# Patient Record
Sex: MALE | Race: WHITE | Employment: OTHER | URBAN - METROPOLITAN AREA
[De-identification: names, ages, dates, MRNs, and addresses within clinical notes are randomized per-mention and may not be internally consistent; named-entity substitution may affect disease eponyms.]

---

## 2017-02-27 ENCOUNTER — HOSPITAL ENCOUNTER (OUTPATIENT)
Age: 62
Setting detail: SPECIMEN
Discharge: HOME | End: 2017-02-27
Payer: COMMERCIAL

## 2017-02-27 ENCOUNTER — OFFICE VISIT (OUTPATIENT)
Dept: INTERNAL MEDICINE | Facility: CLINIC | Age: 62
End: 2017-02-27

## 2017-02-27 VITALS
HEIGHT: 74 IN | WEIGHT: 205 LBS | BODY MASS INDEX: 26.31 KG/M2 | DIASTOLIC BLOOD PRESSURE: 70 MMHG | SYSTOLIC BLOOD PRESSURE: 112 MMHG | RESPIRATION RATE: 14 BRPM

## 2017-02-27 DIAGNOSIS — J45.21 MILD INTERMITTENT ASTHMA WITH ACUTE EXACERBATION: Primary | ICD-10-CM

## 2017-02-27 DIAGNOSIS — G89.29 CHRONIC LOW BACK PAIN WITH SCIATICA, SCIATICA LATERALITY UNSPECIFIED, UNSPECIFIED BACK PAIN LATERALITY: ICD-10-CM

## 2017-02-27 DIAGNOSIS — T50.905A ADVERSE DRUG REACTION, INITIAL ENCOUNTER: ICD-10-CM

## 2017-02-27 DIAGNOSIS — K70.9 LIVER DISEASE, CHRONIC, DUE TO ALCOHOL (HCC): ICD-10-CM

## 2017-02-27 DIAGNOSIS — M54.40 CHRONIC LOW BACK PAIN WITH SCIATICA, SCIATICA LATERALITY UNSPECIFIED, UNSPECIFIED BACK PAIN LATERALITY: ICD-10-CM

## 2017-02-27 PROBLEM — F10.10 CHRONIC ALCOHOL ABUSE: Status: ACTIVE | Noted: 2017-02-27

## 2017-02-27 PROBLEM — F10.10 CHRONIC ALCOHOL ABUSE: Status: RESOLVED | Noted: 2017-02-27 | Resolved: 2017-02-27

## 2017-02-27 LAB
-: ABNORMAL
ABSOLUTE EOS #: 0.3 K/UL (ref 0–0.4)
ABSOLUTE LYMPH #: 2.9 K/UL (ref 1–4.8)
ABSOLUTE MONO #: 0.7 K/UL (ref 0.1–1.2)
ALBUMIN SERPL-MCNC: 4.3 G/DL (ref 3.5–5.2)
ALBUMIN/GLOBULIN RATIO: 1.6 (ref 1–2.5)
ALP BLD-CCNC: 56 U/L (ref 40–129)
ALT SERPL-CCNC: 12 U/L (ref 5–41)
AMORPHOUS: ABNORMAL
ANION GAP SERPL CALCULATED.3IONS-SCNC: 13 MMOL/L (ref 9–17)
AST SERPL-CCNC: 14 U/L
BACTERIA: ABNORMAL
BASOPHILS # BLD: 0 % (ref 0–2)
BASOPHILS ABSOLUTE: 0 K/UL (ref 0–0.2)
BILIRUB SERPL-MCNC: 0.56 MG/DL (ref 0.3–1.2)
BILIRUBIN URINE: NEGATIVE
BUN BLDV-MCNC: 12 MG/DL (ref 8–23)
BUN/CREAT BLD: ABNORMAL (ref 9–20)
C-REACTIVE PROTEIN: 4.8 MG/L (ref 0–5)
CALCIUM SERPL-MCNC: 9.4 MG/DL (ref 8.6–10.4)
CASTS UA: ABNORMAL /LPF (ref 0–8)
CHLORIDE BLD-SCNC: 97 MMOL/L (ref 98–107)
CO2: 22 MMOL/L (ref 20–31)
COLOR: YELLOW
CREAT SERPL-MCNC: 0.76 MG/DL (ref 0.7–1.2)
CRYSTALS, UA: ABNORMAL /HPF
DIFFERENTIAL TYPE: NORMAL
EOSINOPHILS RELATIVE PERCENT: 3 % (ref 1–4)
EPITHELIAL CELLS UA: ABNORMAL /HPF (ref 0–5)
GFR AFRICAN AMERICAN: >60 ML/MIN
GFR NON-AFRICAN AMERICAN: >60 ML/MIN
GFR SERPL CREATININE-BSD FRML MDRD: ABNORMAL ML/MIN/{1.73_M2}
GFR SERPL CREATININE-BSD FRML MDRD: ABNORMAL ML/MIN/{1.73_M2}
GLUCOSE BLD-MCNC: 95 MG/DL (ref 70–99)
GLUCOSE URINE: NEGATIVE
HCT VFR BLD CALC: 47 % (ref 41–53)
HEMOGLOBIN: 15.7 G/DL (ref 13.5–17.5)
KETONES, URINE: NEGATIVE
LEUKOCYTE ESTERASE, URINE: NEGATIVE
LYMPHOCYTES # BLD: 33 % (ref 24–44)
MCH RBC QN AUTO: 30.1 PG (ref 26–34)
MCHC RBC AUTO-ENTMCNC: 33.4 G/DL (ref 31–37)
MCV RBC AUTO: 90.1 FL (ref 80–100)
MONOCYTES # BLD: 8 % (ref 2–11)
MUCUS: ABNORMAL
NITRITE, URINE: NEGATIVE
OTHER OBSERVATIONS UA: ABNORMAL
PDW BLD-RTO: 14.8 % (ref 12.5–15.4)
PH UA: 6.5 (ref 5–8)
PLATELET # BLD: 290 K/UL (ref 140–450)
PLATELET ESTIMATE: NORMAL
PMV BLD AUTO: 8.4 FL (ref 6–12)
POTASSIUM SERPL-SCNC: 4.2 MMOL/L (ref 3.7–5.3)
PROTEIN UA: NEGATIVE
RBC # BLD: 5.21 M/UL (ref 4.5–5.9)
RBC # BLD: NORMAL 10*6/UL
RBC UA: ABNORMAL /HPF (ref 0–4)
RENAL EPITHELIAL, UA: ABNORMAL /HPF
SEG NEUTROPHILS: 56 % (ref 36–66)
SEGMENTED NEUTROPHILS ABSOLUTE COUNT: 5 K/UL (ref 1.8–7.7)
SODIUM BLD-SCNC: 132 MMOL/L (ref 135–144)
SPECIFIC GRAVITY UA: 1.01 (ref 1–1.03)
TOTAL PROTEIN: 7 G/DL (ref 6.4–8.3)
TRICHOMONAS: ABNORMAL
TSH SERPL DL<=0.05 MIU/L-ACNC: 3.23 MIU/L (ref 0.3–5)
TURBIDITY: CLEAR
URINE HGB: ABNORMAL
UROBILINOGEN, URINE: NORMAL
WBC # BLD: 8.9 K/UL (ref 3.5–11)
WBC # BLD: NORMAL 10*3/UL
WBC UA: ABNORMAL /HPF (ref 0–5)
YEAST: ABNORMAL

## 2017-02-27 PROCEDURE — 99214 OFFICE O/P EST MOD 30 MIN: CPT | Performed by: INTERNAL MEDICINE

## 2017-02-27 ASSESSMENT — ENCOUNTER SYMPTOMS
DIARRHEA: 0
NAUSEA: 0
ABDOMINAL PAIN: 0
TROUBLE SWALLOWING: 0
CONSTIPATION: 0
COLOR CHANGE: 0
COUGH: 1
CHEST TIGHTNESS: 0
SHORTNESS OF BREATH: 0
BACK PAIN: 0
EYE PAIN: 0
EYE DISCHARGE: 0

## 2017-02-28 LAB — SEDIMENTATION RATE, ERYTHROCYTE: 3 MM (ref 0–10)

## 2017-03-23 ENCOUNTER — HOSPITAL ENCOUNTER (OUTPATIENT)
Dept: SLEEP CENTER | Age: 62
Discharge: HOME | End: 2017-03-23
Payer: COMMERCIAL

## 2017-03-23 VITALS — HEART RATE: 67 BPM | BODY MASS INDEX: 26.31 KG/M2 | HEIGHT: 74 IN | RESPIRATION RATE: 14 BRPM | WEIGHT: 205 LBS

## 2017-03-23 DIAGNOSIS — G47.33 OSA (OBSTRUCTIVE SLEEP APNEA): Primary | ICD-10-CM

## 2017-03-23 PROCEDURE — 95810 POLYSOM 6/> YRS 4/> PARAM: CPT

## 2017-03-23 ASSESSMENT — SLEEP AND FATIGUE QUESTIONNAIRES
HOW LIKELY ARE YOU TO NOD OFF OR FALL ASLEEP WHILE LYING DOWN TO REST IN THE AFTERNOON WHEN CIRCUMSTANCES PERMIT: 3
HOW LIKELY ARE YOU TO NOD OFF OR FALL ASLEEP WHILE SITTING AND TALKING TO SOMEONE: 0
HOW LIKELY ARE YOU TO NOD OFF OR FALL ASLEEP WHILE SITTING INACTIVE IN A PUBLIC PLACE: 1
HOW LIKELY ARE YOU TO NOD OFF OR FALL ASLEEP WHEN YOU ARE A PASSENGER IN A CAR FOR AN HOUR WITHOUT A BREAK: 2
ESS TOTAL SCORE: 14
NECK CIRCUMFERENCE (INCHES): 38
HOW LIKELY ARE YOU TO NOD OFF OR FALL ASLEEP WHILE SITTING AND READING: 3
HOW LIKELY ARE YOU TO NOD OFF OR FALL ASLEEP WHILE SITTING QUIETLY AFTER LUNCH WITHOUT ALCOHOL: 2
HOW LIKELY ARE YOU TO NOD OFF OR FALL ASLEEP WHILE WATCHING TV: 3
HOW LIKELY ARE YOU TO NOD OFF OR FALL ASLEEP IN A CAR, WHILE STOPPED FOR A FEW MINUTES IN TRAFFIC: 0

## 2017-03-27 ENCOUNTER — OFFICE VISIT (OUTPATIENT)
Dept: INTERNAL MEDICINE CLINIC | Age: 62
End: 2017-03-27
Payer: COMMERCIAL

## 2017-03-27 VITALS
RESPIRATION RATE: 14 BRPM | HEIGHT: 74 IN | SYSTOLIC BLOOD PRESSURE: 112 MMHG | WEIGHT: 209 LBS | BODY MASS INDEX: 26.82 KG/M2 | DIASTOLIC BLOOD PRESSURE: 62 MMHG

## 2017-03-27 DIAGNOSIS — M54.40 CHRONIC LEFT-SIDED LOW BACK PAIN WITH SCIATICA, SCIATICA LATERALITY UNSPECIFIED: ICD-10-CM

## 2017-03-27 DIAGNOSIS — T50.905S: ICD-10-CM

## 2017-03-27 DIAGNOSIS — J45.41 MODERATE PERSISTENT ASTHMA WITH ACUTE EXACERBATION: ICD-10-CM

## 2017-03-27 DIAGNOSIS — G89.29 CHRONIC LEFT-SIDED LOW BACK PAIN WITH SCIATICA, SCIATICA LATERALITY UNSPECIFIED: ICD-10-CM

## 2017-03-27 DIAGNOSIS — Z23 NEED FOR VACCINATION: Primary | ICD-10-CM

## 2017-03-27 DIAGNOSIS — J45.909 UNCOMPLICATED ASTHMA, UNSPECIFIED ASTHMA SEVERITY: ICD-10-CM

## 2017-03-27 DIAGNOSIS — Z12.9 CANCER SCREENING: ICD-10-CM

## 2017-03-27 PROCEDURE — 99214 OFFICE O/P EST MOD 30 MIN: CPT | Performed by: INTERNAL MEDICINE

## 2017-03-27 PROCEDURE — 90471 IMMUNIZATION ADMIN: CPT | Performed by: INTERNAL MEDICINE

## 2017-03-27 PROCEDURE — 90732 PPSV23 VACC 2 YRS+ SUBQ/IM: CPT | Performed by: INTERNAL MEDICINE

## 2017-03-27 RX ORDER — CELECOXIB 200 MG/1
200 CAPSULE ORAL DAILY
Qty: 30 CAPSULE | Refills: 3 | Status: SHIPPED | OUTPATIENT
Start: 2017-03-27 | End: 2017-04-24 | Stop reason: SDUPTHER

## 2017-03-27 ASSESSMENT — PATIENT HEALTH QUESTIONNAIRE - PHQ9
1. LITTLE INTEREST OR PLEASURE IN DOING THINGS: 0
SUM OF ALL RESPONSES TO PHQ9 QUESTIONS 1 & 2: 0
2. FEELING DOWN, DEPRESSED OR HOPELESS: 0
SUM OF ALL RESPONSES TO PHQ QUESTIONS 1-9: 0

## 2017-03-27 ASSESSMENT — ENCOUNTER SYMPTOMS
HEARTBURN: 0
COLOR CHANGE: 0
SORE THROAT: 0
TROUBLE SWALLOWING: 0
ABDOMINAL PAIN: 0
RHINORRHEA: 0
BACK PAIN: 0
COUGH: 1
EYE DISCHARGE: 0
DIFFICULTY BREATHING: 1
NAUSEA: 0
SHORTNESS OF BREATH: 0
EYE PAIN: 0
DIARRHEA: 0
CHEST TIGHTNESS: 0
CONSTIPATION: 0

## 2017-04-03 ENCOUNTER — HOSPITAL ENCOUNTER (OUTPATIENT)
Dept: SLEEP CENTER | Age: 62
Discharge: HOME | End: 2017-04-03
Payer: COMMERCIAL

## 2017-04-03 DIAGNOSIS — G47.33 OSA (OBSTRUCTIVE SLEEP APNEA): Primary | ICD-10-CM

## 2017-04-03 PROCEDURE — 95811 POLYSOM 6/>YRS CPAP 4/> PARM: CPT

## 2017-04-07 ENCOUNTER — TELEPHONE (OUTPATIENT)
Dept: INTERNAL MEDICINE CLINIC | Age: 62
End: 2017-04-07

## 2017-04-07 DIAGNOSIS — J45.41 MODERATE PERSISTENT ASTHMA WITH ACUTE EXACERBATION: Primary | ICD-10-CM

## 2017-04-07 DIAGNOSIS — G47.00 INSOMNIA, UNSPECIFIED TYPE: ICD-10-CM

## 2017-04-24 ENCOUNTER — OFFICE VISIT (OUTPATIENT)
Dept: INTERNAL MEDICINE CLINIC | Age: 62
End: 2017-04-24
Payer: COMMERCIAL

## 2017-04-24 VITALS
WEIGHT: 208 LBS | DIASTOLIC BLOOD PRESSURE: 62 MMHG | HEIGHT: 74 IN | SYSTOLIC BLOOD PRESSURE: 110 MMHG | BODY MASS INDEX: 26.69 KG/M2 | RESPIRATION RATE: 14 BRPM

## 2017-04-24 DIAGNOSIS — J45.41 MODERATE PERSISTENT ASTHMA WITH ACUTE EXACERBATION: Primary | ICD-10-CM

## 2017-04-24 DIAGNOSIS — G47.33 OBSTRUCTIVE SLEEP APNEA: ICD-10-CM

## 2017-04-24 DIAGNOSIS — I47.1 PAROXYSMAL SVT (SUPRAVENTRICULAR TACHYCARDIA) (HCC): ICD-10-CM

## 2017-04-24 DIAGNOSIS — J44.9 COPD (CHRONIC OBSTRUCTIVE PULMONARY DISEASE) WITH CHRONIC BRONCHITIS (HCC): ICD-10-CM

## 2017-04-24 PROCEDURE — 99214 OFFICE O/P EST MOD 30 MIN: CPT | Performed by: INTERNAL MEDICINE

## 2017-04-24 RX ORDER — CELECOXIB 200 MG/1
200 CAPSULE ORAL 2 TIMES DAILY
Qty: 60 CAPSULE | Refills: 5 | Status: SHIPPED | OUTPATIENT
Start: 2017-04-24 | End: 2017-05-08 | Stop reason: SDUPTHER

## 2017-04-24 RX ORDER — CELECOXIB 200 MG/1
200 CAPSULE ORAL 2 TIMES DAILY
Qty: 60 CAPSULE | Refills: 3 | Status: SHIPPED | OUTPATIENT
Start: 2017-04-24 | End: 2017-04-24 | Stop reason: SDUPTHER

## 2017-04-24 ASSESSMENT — ENCOUNTER SYMPTOMS
CHEST TIGHTNESS: 0
BACK PAIN: 0
EYE PAIN: 0
EYE DISCHARGE: 0
TROUBLE SWALLOWING: 0
CONSTIPATION: 0
HEARTBURN: 0
SORE THROAT: 0
DIARRHEA: 0
SHORTNESS OF BREATH: 0
COLOR CHANGE: 0
COUGH: 1
RHINORRHEA: 0
DIFFICULTY BREATHING: 1
ABDOMINAL PAIN: 0
NAUSEA: 0

## 2017-05-08 RX ORDER — CELECOXIB 200 MG/1
200 CAPSULE ORAL 2 TIMES DAILY
Qty: 180 CAPSULE | Refills: 3 | Status: SHIPPED | OUTPATIENT
Start: 2017-05-08 | End: 2020-09-16

## 2017-05-08 RX ORDER — CLOBETASOL PROPIONATE 0.5 MG/G
OINTMENT TOPICAL
Qty: 100 G | Refills: 3 | Status: SHIPPED | OUTPATIENT
Start: 2017-05-08 | End: 2019-04-05 | Stop reason: SDUPTHER

## 2017-05-10 DIAGNOSIS — Z12.9 CANCER SCREENING: ICD-10-CM

## 2017-05-10 LAB
CONTROL: PRESENT
HEMOCCULT STL QL: POSITIVE

## 2017-05-12 DIAGNOSIS — Z12.11 COLON CANCER SCREENING: ICD-10-CM

## 2017-05-12 DIAGNOSIS — R19.5 POSITIVE FIT (FECAL IMMUNOCHEMICAL TEST): Primary | ICD-10-CM

## 2017-05-23 RX ORDER — TRAZODONE HYDROCHLORIDE 150 MG/1
TABLET ORAL
Qty: 90 TABLET | Refills: 0 | Status: SHIPPED | OUTPATIENT
Start: 2017-05-23 | End: 2017-07-31 | Stop reason: SDUPTHER

## 2017-06-05 ENCOUNTER — HOSPITAL ENCOUNTER (OUTPATIENT)
Facility: CLINIC | Age: 62
Discharge: HOME | End: 2017-06-05
Payer: COMMERCIAL

## 2017-06-05 ENCOUNTER — OFFICE VISIT (OUTPATIENT)
Dept: INTERNAL MEDICINE CLINIC | Age: 62
End: 2017-06-05
Payer: COMMERCIAL

## 2017-06-05 ENCOUNTER — HOSPITAL ENCOUNTER (OUTPATIENT)
Dept: GENERAL RADIOLOGY | Facility: CLINIC | Age: 62
Discharge: HOME | End: 2017-06-05
Payer: COMMERCIAL

## 2017-06-05 VITALS
SYSTOLIC BLOOD PRESSURE: 120 MMHG | BODY MASS INDEX: 26.82 KG/M2 | HEART RATE: 85 BPM | DIASTOLIC BLOOD PRESSURE: 66 MMHG | HEIGHT: 74 IN | WEIGHT: 209 LBS

## 2017-06-05 DIAGNOSIS — M25.562 ACUTE PAIN OF LEFT KNEE: Primary | ICD-10-CM

## 2017-06-05 DIAGNOSIS — J42 CHRONIC BRONCHITIS, UNSPECIFIED CHRONIC BRONCHITIS TYPE (HCC): ICD-10-CM

## 2017-06-05 DIAGNOSIS — M25.562 ACUTE PAIN OF LEFT KNEE: ICD-10-CM

## 2017-06-05 PROCEDURE — 99213 OFFICE O/P EST LOW 20 MIN: CPT | Performed by: INTERNAL MEDICINE

## 2017-06-05 PROCEDURE — 73562 X-RAY EXAM OF KNEE 3: CPT

## 2017-06-11 ASSESSMENT — ENCOUNTER SYMPTOMS
CHEST TIGHTNESS: 0
APNEA: 0
SHORTNESS OF BREATH: 1
ABDOMINAL DISTENTION: 0
CHOKING: 0
EYE DISCHARGE: 0
COUGH: 0
ANAL BLEEDING: 0
ABDOMINAL PAIN: 0
EYE ITCHING: 0
EYE REDNESS: 0
BACK PAIN: 0
EYE PAIN: 0

## 2017-06-14 ENCOUNTER — HOSPITAL ENCOUNTER (OUTPATIENT)
Dept: PHYSICAL THERAPY | Age: 62
Setting detail: THERAPIES SERIES
Discharge: HOME | End: 2017-06-14
Payer: COMMERCIAL

## 2017-06-14 PROCEDURE — 97162 PT EVAL MOD COMPLEX 30 MIN: CPT

## 2017-06-14 PROCEDURE — 97110 THERAPEUTIC EXERCISES: CPT

## 2017-06-15 ASSESSMENT — PAIN DESCRIPTION - ORIENTATION: ORIENTATION: LEFT

## 2017-06-15 ASSESSMENT — PAIN SCALES - GENERAL: PAINLEVEL_OUTOF10: 5

## 2017-06-15 ASSESSMENT — PAIN DESCRIPTION - LOCATION: LOCATION: KNEE

## 2017-06-19 ENCOUNTER — HOSPITAL ENCOUNTER (OUTPATIENT)
Dept: PHYSICAL THERAPY | Age: 62
Setting detail: THERAPIES SERIES
Discharge: HOME | End: 2017-06-19
Payer: COMMERCIAL

## 2017-06-23 ENCOUNTER — HOSPITAL ENCOUNTER (OUTPATIENT)
Dept: PHYSICAL THERAPY | Age: 62
Setting detail: THERAPIES SERIES
End: 2017-06-23
Payer: COMMERCIAL

## 2017-08-01 RX ORDER — TRAZODONE HYDROCHLORIDE 150 MG/1
TABLET ORAL
Qty: 90 TABLET | Refills: 3 | Status: SHIPPED | OUTPATIENT
Start: 2017-08-01 | End: 2018-07-04 | Stop reason: SDUPTHER

## 2017-09-18 RX ORDER — BUDESONIDE AND FORMOTEROL FUMARATE DIHYDRATE 160; 4.5 UG/1; UG/1
AEROSOL RESPIRATORY (INHALATION)
Qty: 30.6 G | Refills: 5 | Status: SHIPPED | OUTPATIENT
Start: 2017-09-18 | End: 2018-07-26 | Stop reason: SDUPTHER

## 2018-07-05 RX ORDER — TRAZODONE HYDROCHLORIDE 150 MG/1
TABLET ORAL
Qty: 90 TABLET | Refills: 3 | Status: SHIPPED | OUTPATIENT
Start: 2018-07-05 | End: 2019-04-05 | Stop reason: SDUPTHER

## 2018-07-26 ENCOUNTER — HOSPITAL ENCOUNTER (OUTPATIENT)
Age: 63
Setting detail: SPECIMEN
Discharge: HOME | End: 2018-07-26
Payer: COMMERCIAL

## 2018-07-26 ENCOUNTER — OFFICE VISIT (OUTPATIENT)
Dept: INTERNAL MEDICINE CLINIC | Age: 63
End: 2018-07-26
Payer: COMMERCIAL

## 2018-07-26 VITALS
WEIGHT: 226 LBS | BODY MASS INDEX: 29 KG/M2 | SYSTOLIC BLOOD PRESSURE: 122 MMHG | DIASTOLIC BLOOD PRESSURE: 82 MMHG | HEIGHT: 74 IN

## 2018-07-26 DIAGNOSIS — Z12.11 COLON CANCER SCREENING: Primary | ICD-10-CM

## 2018-07-26 DIAGNOSIS — R19.5 POSITIVE FIT (FECAL IMMUNOCHEMICAL TEST): ICD-10-CM

## 2018-07-26 DIAGNOSIS — Z12.11 COLON CANCER SCREENING: ICD-10-CM

## 2018-07-26 DIAGNOSIS — J45.30 MILD PERSISTENT ASTHMA WITHOUT COMPLICATION: ICD-10-CM

## 2018-07-26 LAB — HEPATITIS C ANTIBODY: NONREACTIVE

## 2018-07-26 PROCEDURE — 99213 OFFICE O/P EST LOW 20 MIN: CPT | Performed by: INTERNAL MEDICINE

## 2018-07-26 RX ORDER — ALBUTEROL SULFATE 90 UG/1
1 AEROSOL, METERED RESPIRATORY (INHALATION) EVERY 4 HOURS PRN
Qty: 3 INHALER | Refills: 3 | Status: SHIPPED | OUTPATIENT
Start: 2018-07-26 | End: 2019-05-31 | Stop reason: SDUPTHER

## 2018-07-26 RX ORDER — BUDESONIDE AND FORMOTEROL FUMARATE DIHYDRATE 160; 4.5 UG/1; UG/1
2 AEROSOL RESPIRATORY (INHALATION) 2 TIMES DAILY
Qty: 3 INHALER | Refills: 3 | Status: SHIPPED | OUTPATIENT
Start: 2018-07-26 | End: 2019-07-05 | Stop reason: ALTCHOICE

## 2018-07-26 ASSESSMENT — PATIENT HEALTH QUESTIONNAIRE - PHQ9
SUM OF ALL RESPONSES TO PHQ QUESTIONS 1-9: 0
1. LITTLE INTEREST OR PLEASURE IN DOING THINGS: 0
2. FEELING DOWN, DEPRESSED OR HOPELESS: 0
SUM OF ALL RESPONSES TO PHQ9 QUESTIONS 1 & 2: 0

## 2018-07-26 ASSESSMENT — ENCOUNTER SYMPTOMS
EYE DISCHARGE: 0
COUGH: 0
DIARRHEA: 0
WHEEZING: 0
SHORTNESS OF BREATH: 0
BLOOD IN STOOL: 0
TROUBLE SWALLOWING: 0
ABDOMINAL DISTENTION: 0
COLOR CHANGE: 0
EYE PAIN: 0

## 2018-07-26 NOTE — PROGRESS NOTES
Subjective:      Visit Information    Have you changed or started any medications since your last visit including any over-the-counter medicines, vitamins, or herbal medicines? no   Have you stopped taking any of your medications? Is so, why? -  no  Are you having any side effects from any of your medications? - no    Have you seen any other physician or provider since your last visit?  no   Have you had any other diagnostic tests since your last visit?  no   Have you been seen in the emergency room and/or had an admission in a hospital since we last saw you?  no   Have you had your routine dental cleaning in the past 6 months?  yes -      Do you have an active MyChart account? If no, what is the barrier? Yes    Patient Care Team:  Alexandra Lipscomb MD as PCP - General (Internal Medicine)  Geraldine Gore MD as PCP - S Attributed Provider    Medical History Review  Past Medical, Family, and Social History reviewed and does not contribute to the patient presenting condition    Health Maintenance   Topic Date Due    Hepatitis C screen  1955    HIV screen  03/06/1969    DTaP/Tdap/Td vaccine (1 - Tdap) 03/06/1973    Shingles Vaccine (1 of 2 - 2 Dose Series) 03/06/2004    Lipid screen  02/09/2017    Colon Cancer Screen FIT/FOBT  05/10/2018    Flu vaccine (1) 09/01/2018    Pneumococcal med risk  Completed             Patient ID: Basia Matos is a 58 y.o. male. Here for f/u on asthma          Review of Systems   Constitutional: Negative for appetite change, diaphoresis and fatigue. HENT: Negative for ear discharge and trouble swallowing. Eyes: Negative for pain and discharge. Respiratory: Negative for cough, shortness of breath and wheezing. Cardiovascular: Negative for chest pain and palpitations. Gastrointestinal: Negative for abdominal distention, blood in stool and diarrhea. Endocrine: Negative for polydipsia and polyphagia.    Genitourinary: Negative for difficulty urinating and

## 2018-07-30 ENCOUNTER — TELEPHONE (OUTPATIENT)
Dept: GASTROENTEROLOGY | Age: 63
End: 2018-07-30

## 2018-08-21 ENCOUNTER — OFFICE VISIT (OUTPATIENT)
Dept: GASTROENTEROLOGY | Age: 63
End: 2018-08-21
Payer: COMMERCIAL

## 2018-08-21 VITALS
BODY MASS INDEX: 28.83 KG/M2 | DIASTOLIC BLOOD PRESSURE: 77 MMHG | HEART RATE: 85 BPM | WEIGHT: 224.6 LBS | HEIGHT: 74 IN | SYSTOLIC BLOOD PRESSURE: 135 MMHG

## 2018-08-21 DIAGNOSIS — R13.10 DYSPHAGIA, UNSPECIFIED TYPE: ICD-10-CM

## 2018-08-21 DIAGNOSIS — R19.5 OCCULT BLOOD IN STOOLS: Primary | ICD-10-CM

## 2018-08-21 PROCEDURE — 99244 OFF/OP CNSLTJ NEW/EST MOD 40: CPT | Performed by: INTERNAL MEDICINE

## 2018-08-21 ASSESSMENT — ENCOUNTER SYMPTOMS
ABDOMINAL PAIN: 1
CHEST TIGHTNESS: 0
ABDOMINAL DISTENTION: 1
ANAL BLEEDING: 0
WHEEZING: 1
VOMITING: 0
TROUBLE SWALLOWING: 1
COUGH: 0
RECTAL PAIN: 0
COLOR CHANGE: 0
DIARRHEA: 0
SINUS PRESSURE: 0
NAUSEA: 0
BACK PAIN: 1
CONSTIPATION: 0
SHORTNESS OF BREATH: 1
BLOOD IN STOOL: 1
CHOKING: 0

## 2018-08-21 NOTE — PROGRESS NOTES
Gastrointestinal: Positive for abdominal distention, abdominal pain and blood in stool. Negative for anal bleeding, constipation, diarrhea, nausea, rectal pain and vomiting. Endocrine: Negative for cold intolerance and heat intolerance. Genitourinary: Negative for difficulty urinating, frequency and urgency. Musculoskeletal: Positive for back pain. Negative for neck pain and neck stiffness. Skin: Negative for color change and pallor. Allergic/Immunologic: Negative for environmental allergies and food allergies. Neurological: Negative for dizziness, tremors, weakness and light-headedness. Hematological: Bruises/bleeds easily. Psychiatric/Behavioral: Negative for agitation, confusion and sleep disturbance. The patient is not nervous/anxious. Objective:   Physical Exam   Constitutional: He is oriented to person, place, and time. He appears well-developed and well-nourished. No distress. HENT:   Head: Normocephalic and atraumatic. Mouth/Throat: No oropharyngeal exudate. Eyes: Pupils are equal, round, and reactive to light. Conjunctivae are normal. No scleral icterus. Neck: Normal range of motion. Neck supple. No tracheal deviation present. No thyromegaly present. Cardiovascular: Normal rate, regular rhythm, normal heart sounds and intact distal pulses. No murmur heard. Pulmonary/Chest: Effort normal and breath sounds normal. No respiratory distress. He has no wheezes. He has no rales. Abdominal: Soft. Bowel sounds are normal. He exhibits no distension, no ascites and no mass. There is no hepatomegaly. There is no tenderness. There is no guarding. No hernia. No peripheral signs of ch. Liver disease   Genitourinary: Rectum normal. Rectal exam shows guaiac negative stool. Musculoskeletal: He exhibits no edema. Lymphadenopathy:     He has no cervical adenopathy. Neurological: He is alert and oriented to person, place, and time. No cranial nerve deficit.    Skin: Skin is warm

## 2018-08-28 ENCOUNTER — TELEPHONE (OUTPATIENT)
Dept: GASTROENTEROLOGY | Age: 63
End: 2018-08-28

## 2018-08-29 ENCOUNTER — HOSPITAL ENCOUNTER (OUTPATIENT)
Age: 63
Setting detail: OUTPATIENT SURGERY
Discharge: HOME | End: 2018-08-29
Attending: INTERNAL MEDICINE | Admitting: INTERNAL MEDICINE
Payer: COMMERCIAL

## 2018-08-29 VITALS
HEART RATE: 72 BPM | HEIGHT: 74 IN | SYSTOLIC BLOOD PRESSURE: 120 MMHG | RESPIRATION RATE: 20 BRPM | WEIGHT: 224 LBS | BODY MASS INDEX: 28.75 KG/M2 | DIASTOLIC BLOOD PRESSURE: 88 MMHG | OXYGEN SATURATION: 95 % | TEMPERATURE: 98.1 F

## 2018-08-29 PROCEDURE — 7100000010 HC PHASE II RECOVERY - FIRST 15 MIN: Performed by: INTERNAL MEDICINE

## 2018-08-29 PROCEDURE — 88341 IMHCHEM/IMCYTCHM EA ADD ANTB: CPT

## 2018-08-29 PROCEDURE — 2709999900 HC NON-CHARGEABLE SUPPLY: Performed by: INTERNAL MEDICINE

## 2018-08-29 PROCEDURE — 7100000011 HC PHASE II RECOVERY - ADDTL 15 MIN: Performed by: INTERNAL MEDICINE

## 2018-08-29 PROCEDURE — 2580000003 HC RX 258: Performed by: INTERNAL MEDICINE

## 2018-08-29 PROCEDURE — 88312 SPECIAL STAINS GROUP 1: CPT

## 2018-08-29 PROCEDURE — 6360000002 HC RX W HCPCS: Performed by: INTERNAL MEDICINE

## 2018-08-29 PROCEDURE — 99153 MOD SED SAME PHYS/QHP EA: CPT | Performed by: INTERNAL MEDICINE

## 2018-08-29 PROCEDURE — 88342 IMHCHEM/IMCYTCHM 1ST ANTB: CPT

## 2018-08-29 PROCEDURE — 3609012400 HC EGD TRANSORAL BIOPSY SINGLE/MULTIPLE: Performed by: INTERNAL MEDICINE

## 2018-08-29 PROCEDURE — 3609027000 HC COLONOSCOPY: Performed by: INTERNAL MEDICINE

## 2018-08-29 PROCEDURE — 88305 TISSUE EXAM BY PATHOLOGIST: CPT

## 2018-08-29 PROCEDURE — 6370000000 HC RX 637 (ALT 250 FOR IP): Performed by: INTERNAL MEDICINE

## 2018-08-29 PROCEDURE — 99152 MOD SED SAME PHYS/QHP 5/>YRS: CPT | Performed by: INTERNAL MEDICINE

## 2018-08-29 RX ORDER — SODIUM CHLORIDE 9 MG/ML
INJECTION, SOLUTION INTRAVENOUS CONTINUOUS
Status: DISCONTINUED | OUTPATIENT
Start: 2018-08-29 | End: 2018-08-29 | Stop reason: HOSPADM

## 2018-08-29 RX ORDER — FENTANYL CITRATE 50 UG/ML
INJECTION, SOLUTION INTRAMUSCULAR; INTRAVENOUS PRN
Status: DISCONTINUED | OUTPATIENT
Start: 2018-08-29 | End: 2018-08-29 | Stop reason: HOSPADM

## 2018-08-29 RX ORDER — MIDAZOLAM HYDROCHLORIDE 1 MG/ML
INJECTION INTRAMUSCULAR; INTRAVENOUS PRN
Status: DISCONTINUED | OUTPATIENT
Start: 2018-08-29 | End: 2018-08-29 | Stop reason: HOSPADM

## 2018-08-29 RX ADMIN — SODIUM CHLORIDE: 9 INJECTION, SOLUTION INTRAVENOUS at 10:55

## 2018-08-29 ASSESSMENT — PAIN DESCRIPTION - DESCRIPTORS: DESCRIPTORS: CRAMPING

## 2018-08-29 ASSESSMENT — PAIN - FUNCTIONAL ASSESSMENT: PAIN_FUNCTIONAL_ASSESSMENT: 0-10

## 2018-08-29 ASSESSMENT — PAIN DESCRIPTION - LOCATION: LOCATION: ABDOMEN

## 2018-08-29 ASSESSMENT — PAIN SCALES - GENERAL: PAINLEVEL_OUTOF10: 2

## 2018-08-29 NOTE — OP NOTE
ESOPHAGOGASTRODUODENOSCOPY   ( EGD )  DATE OF PROCEDURE: 8/29/2018     SURGEON: Erich Jackson MD    ASSISTANT: None    PREOPERATIVE DIAGNOSIS: Patient has dysphagia. Has a chronic GERD. This procedure is performed to evaluate upper GI lesions. POSTOPERATIVE DIAGNOSIS: Polyp at the superior esophageal sphincter. Inflammation in the esophagus. OPERATION: Upper GI endoscopy with Biopsy    ANESTHESIA: Moderate sedation     ESTIMATED BLOOD LOSS: None    COMPLICATIONS: None. SPECIMENS:  Was Obtained: From the body of the esophagus to rule out microscopic esophagitis and eosinophilic esophagitis. A polyp at the superior esophageal sphincter which is about 5 mm. HISTORY: The patient is a 58y.o. year old male with history of above preop diagnosis. I recommended esophagogastroduodenoscopy with possible biopsy and I explained the risk, benefits, expected outcome, and alternatives to the procedure. Risks included but are not limited to bleeding, infection, respiratory distress, hypotension, and perforation of the esophagus, stomach, or duodenum. Patient understands and is in agreement. PROCEDURE: The patient was given IV conscious sedation. The patient's SPO2 remained above 90% throughout the procedure. Cetacaine spray given. Patient placed in left lateral position. Olympus  videogastroscope was inserted orally under vision into the esophagus without difficulty and advanced into the stomach then through the pylorus up to the second part of duodenum. Findings:    Retropharyngeal area was grossly normal appearing    Esophagus: abnormal: A polyp which is about 5 mm, sessile at the superior  Esophageal sphincter. Multiple biopsies taken from this area. Also biopsies taken from the body of the esophagus to evaluate microscopic esophagitis. At the GE junction patient has inflammation probably Schatzki's ring which is broken.   May have a small sliding hiatal hernia. Stomach:    Fundus and Cardia Examined in Retroflexed View: normal    Body: normal    Antrum: normal    Duodenum:     Descending: normal    Bulb: abnormal: May have mild duodenitis. While withdrawing the scope the above findings were verified and the scope was removed. The patient has tolerated the procedure and conscious sedation without unusual events. Recommendations/Plan:   1. F/U Biopsies  2. F/U In Office as instructed  3.  Discussed with the family      Needs a repeat exam after 3 months of PPI therapy             Electronically signed by Charisma Lynn MD  on 8/29/2018 at 11:25 AM

## 2018-08-29 NOTE — H&P
HISTORY and [id number]       NAME:  Axel Vu  MRN: [id number]   YOB: 1955   Date: 8/29/2018   Age: 58 y.o. Gender: male     COMPLAINT AND PRESENT HISTORY:     Axel Vu is a 58 y.o.  male, here today for EGD and Colonoscopy. Last screening approximately 10 years ago, history of colon polyps. Patient reports recent dysphagia, occult positive stools. Patient denies recent abdominal pain/cramping, denies any other GI symptoms. No nausea/vomiting, constipation/diarrhea. No changes in the color, caliber or consistency of the stools. Patient reports taking ibuprofen daily for pain relief. History of tobacco use, 20+ pack years, quit 10 years ago. He is feeling well today, no recent fever/chills, chest pain, shortness of breath. PAST MEDICAL HISTORY     Past Medical History:   Diagnosis Date    Asthma     Calculus of gallbladder without mention of cholecystitis or obstruction     Chronic back pain     Hyperlipidemia     Insomnia, unspecified     Spinal stenosis, unspecified region other than cervical     Urinary incontinence      SURGICAL HISTORY       Past Surgical History:   Procedure Laterality Date    TONSILLECTOMY       FAMILY HISTORY     No family history on file. SOCIAL HISTORY       Social History     Social History    Marital status:      Spouse name: N/A    Number of children: N/A    Years of education: N/A     Social History Main Topics    Smoking status: Former Smoker     Packs/day: 1.00     Years: 24.00     Types: Cigarettes     Quit date: 2008    Smokeless tobacco: Never Used    Alcohol use No      Comment: previous 3-4 drinks per day.  Detox program [facility] 7/2014 and 8/2014    Drug use: No    Sexual activity: Not on file     Other Topics Concern    Not on file     Social History Narrative    No narrative on file     REVIEW OF SYSTEMS      Allergies   Allergen Reactions    Seasonal Other (See Comments)     Sneezing EYES:  Pupils equal, reactive to light and accomodation. Conjunctiva clear, no pallor. Glasses. THROAT:  Not congested. No tonsillar erythema or exudates. NECK:  No stiffness, trachea central.  No palpable masses. CHEST:  Symmetrical and equal on expansion. HEART:  Regular rate, rhythm. No murmur. LUNGS:  Equal on expansion. Clear to auscultation with no adventitious sounds. ABDOMEN:  Soft on palpation. No localized tenderness. No guarding or rigidity. No palpable mass or organomegaly. LYMPHATICS:  No palpable cervical lymphadenopathy. LOCOMOTOR, BACK AND SPINE:  No tenderness or deformities. No flank tenderness. EXTREMITIES:  Symmetrical with no pretibial/pedal edema. No discoloration or ulcerations. No warmth, tenderness, erythema noted in lower legs bilaterally. NEUROLOGIC:  The patient is conscious, alert, oriented. No apparent focal sensory or motor deficits. Cranial nerve exam reveals no deficits. PROVISIONAL DIAGNOSES / SURGERY:      1. Dysphagia  2.  Occult Blood in Stools    - EGD, Colonoscopy     Patient Active Problem List    Diagnosis Date Noted    Dysphagia 08/21/2018    Mild persistent asthma without complication 43/48/2229    COPD (chronic obstructive pulmonary disease) with chronic bronchitis ([address] 04/24/2017    Obstructive sleep apnea 04/24/2017    Paroxysmal SVT (supraventricular tachycardia) ([address] 04/24/2017    Adverse drug reaction 02/27/2017    Liver disease, chronic, due to alcohol ([address] 02/27/2017    Moderate persistent asthma with acute exacerbation 08/23/2016    Mixed hyperlipidemia 08/23/2016    Acute bronchitis 08/23/2016    Acute bronchitis 08/23/2016    Chronic back pain     Insomnia     Calculus of gallbladder     Clostridium difficile diarrhea 09/29/2014         ASA Classification:  Class 2 - A normal healthy patient with

## 2018-08-31 LAB — SURGICAL PATHOLOGY REPORT: NORMAL

## 2018-09-11 RX ORDER — FLUCONAZOLE 100 MG/1
TABLET ORAL
Qty: 12 TABLET | Refills: 0 | Status: SHIPPED | OUTPATIENT
Start: 2018-09-11 | End: 2019-07-05 | Stop reason: ALTCHOICE

## 2018-09-19 ENCOUNTER — OFFICE VISIT (OUTPATIENT)
Dept: GASTROENTEROLOGY | Age: 63
End: 2018-09-19
Payer: COMMERCIAL

## 2018-09-19 VITALS
DIASTOLIC BLOOD PRESSURE: 70 MMHG | BODY MASS INDEX: 28.44 KG/M2 | WEIGHT: 221.5 LBS | HEART RATE: 82 BPM | SYSTOLIC BLOOD PRESSURE: 115 MMHG

## 2018-09-19 DIAGNOSIS — B37.81 CANDIDA ESOPHAGITIS (HCC): Primary | ICD-10-CM

## 2018-09-19 PROCEDURE — 99214 OFFICE O/P EST MOD 30 MIN: CPT | Performed by: INTERNAL MEDICINE

## 2018-09-19 ASSESSMENT — ENCOUNTER SYMPTOMS
PHOTOPHOBIA: 0
ABDOMINAL DISTENTION: 0
EYE REDNESS: 0
ABDOMINAL PAIN: 0
EYE PAIN: 0
EYE DISCHARGE: 0
GASTROINTESTINAL NEGATIVE: 1
RESPIRATORY NEGATIVE: 1
EYE ITCHING: 0

## 2018-09-19 NOTE — PROGRESS NOTES
Subjective:      Patient ID: Mariola Mcghee is a 58 y.o. male. Dr. Adali Kwok MD our mutual patient Mariola Mcghee was seen  for No diagnosis found. .    Patient had EGD and a colonoscopy done. Colonoscopy revealed diverticulosis. EGD revealed mild esophagitis and random biopsies from the esophagus revealed candida esophagitis. Patient has been on steroid inhalers. This may be the etiology for this. This small polyp-like lesion in the upper esophagus appears to be benign. At present he denies swallowing issues. GERD symptoms are improved. He has been on PPI therapy as well. His abdominal discomfort, pain resolved. He has satisfactory bowel movements. He is tolerating diet well. Past Medical, Family, and Social History reviewed and does contribute to the patient presenting condition. patient\"s PMH/PSH,SH,PSYCH hx, MEDs, ALLERGIES, and ROS was all reviewed and updated ion the appropriate sections        HPI    Review of Systems   Constitutional: Negative for activity change, appetite change, chills, fatigue, fever and unexpected weight change. HENT: Negative. Eyes: Positive for visual disturbance. Negative for photophobia, pain, discharge, redness and itching. Respiratory: Negative. Cardiovascular: Negative. Gastrointestinal: Negative. Negative for abdominal distention and abdominal pain. Endocrine: Negative. Genitourinary: Negative. Musculoskeletal: Negative. Skin: Negative. Neurological: Negative. Hematological: Negative. Psychiatric/Behavioral: Negative. Objective:   Physical Exam   Constitutional: He is oriented to person, place, and time. He appears well-developed and well-nourished. No distress. HENT:   Head: Normocephalic and atraumatic. Mouth/Throat: No oropharyngeal exudate. Eyes: Pupils are equal, round, and reactive to light. Conjunctivae are normal. No scleral icterus. Neck: Normal range of motion. Neck supple.  No

## 2018-10-03 ENCOUNTER — OFFICE VISIT (OUTPATIENT)
Dept: INTERNAL MEDICINE CLINIC | Age: 63
End: 2018-10-03
Payer: COMMERCIAL

## 2018-10-03 VITALS
SYSTOLIC BLOOD PRESSURE: 136 MMHG | WEIGHT: 226 LBS | BODY MASS INDEX: 29 KG/M2 | DIASTOLIC BLOOD PRESSURE: 80 MMHG | HEIGHT: 74 IN

## 2018-10-03 DIAGNOSIS — E78.2 MIXED HYPERLIPIDEMIA: ICD-10-CM

## 2018-10-03 DIAGNOSIS — I47.1 PAROXYSMAL SVT (SUPRAVENTRICULAR TACHYCARDIA) (HCC): ICD-10-CM

## 2018-10-03 DIAGNOSIS — B37.81 CANDIDA ESOPHAGITIS (HCC): ICD-10-CM

## 2018-10-03 DIAGNOSIS — J44.9 COPD (CHRONIC OBSTRUCTIVE PULMONARY DISEASE) WITH CHRONIC BRONCHITIS (HCC): Primary | ICD-10-CM

## 2018-10-03 PROCEDURE — 90471 IMMUNIZATION ADMIN: CPT | Performed by: INTERNAL MEDICINE

## 2018-10-03 PROCEDURE — 90686 IIV4 VACC NO PRSV 0.5 ML IM: CPT | Performed by: INTERNAL MEDICINE

## 2018-10-03 PROCEDURE — 99214 OFFICE O/P EST MOD 30 MIN: CPT | Performed by: INTERNAL MEDICINE

## 2018-10-03 ASSESSMENT — PATIENT HEALTH QUESTIONNAIRE - PHQ9
1. LITTLE INTEREST OR PLEASURE IN DOING THINGS: 0
SUM OF ALL RESPONSES TO PHQ QUESTIONS 1-9: 0
SUM OF ALL RESPONSES TO PHQ QUESTIONS 1-9: 0
2. FEELING DOWN, DEPRESSED OR HOPELESS: 0
SUM OF ALL RESPONSES TO PHQ9 QUESTIONS 1 & 2: 0

## 2018-10-03 ASSESSMENT — ENCOUNTER SYMPTOMS
COUGH: 0
TROUBLE SWALLOWING: 0
EYE PAIN: 0
ABDOMINAL DISTENTION: 0
EYE DISCHARGE: 0
BLOOD IN STOOL: 0
WHEEZING: 0
COLOR CHANGE: 0
SHORTNESS OF BREATH: 0
DIARRHEA: 0

## 2018-10-03 NOTE — PROGRESS NOTES
kg/m².  /80   Ht 6' 2\" (1.88 m)   Wt 226 lb (102.5 kg)   BMI 29.02 kg/m²     Lab Results   Component Value Date     02/27/2017    K 4.2 02/27/2017    CL 97 02/27/2017    CO2 22 02/27/2017    BUN 12 02/27/2017    CREATININE 0.76 02/27/2017    GLUCOSE 95 02/27/2017    GLUCOSE 110 02/09/2012    CALCIUM 9.4 02/27/2017    PROT 7.0 02/27/2017    LABALBU 4.3 02/27/2017    LABALBU 5.0 02/09/2012    BILITOT 0.56 02/27/2017    ALKPHOS 56 02/27/2017    AST 14 02/27/2017    ALT 12 02/27/2017       Lab Results   Component Value Date    WBC 8.9 02/27/2017    RBC 5.21 02/27/2017    RBC 4.82 02/09/2012    HGB 15.7 02/27/2017    HCT 47.0 02/27/2017    MCV 90.1 02/27/2017    MCH 30.1 02/27/2017    MCHC 33.4 02/27/2017    RDW 14.8 02/27/2017     02/27/2017     02/09/2012    MPV 8.4 02/27/2017       No results found for: LABA1C    Lab Results   Component Value Date    HDL 72 02/09/2012    LDLCHOLESTEROL 117 02/09/2012       Assessment:       Diagnosis Orders   1. COPD (chronic obstructive pulmonary disease) with chronic bronchitis ([facility])     2. Paroxysmal SVT (supraventricular tachycardia) ([facility])     3. Mixed hyperlipidemia               Plan:      No Follow-up on file. Orders Placed This Encounter   Procedures    INFLUENZA, QUADV, 3 YRS AND OLDER, IM, PF, PREFILL SYR OR SDV, 0.5ML (FLUZONE QUADV, PF)     No orders of the defined types were placed in this encounter.     Pt has  Asthma takes symbicort  Had egd done for dyspgaia  Found to have candidat esophgus  Advised to use inhaler and wash mouth after  Finish nystatin swish and swallow  Has seen dr Yoana Velazquez for tiral of ppi for a month  And consult a immunologist for allergy which may contribute to his symtoms  Of asthma      Pt had colon done germain update in system      Corinne Minks, MD

## 2019-04-05 ENCOUNTER — OFFICE VISIT (OUTPATIENT)
Dept: INTERNAL MEDICINE CLINIC | Age: 64
End: 2019-04-05
Payer: MEDICARE

## 2019-04-05 VITALS
HEIGHT: 74 IN | DIASTOLIC BLOOD PRESSURE: 78 MMHG | HEART RATE: 91 BPM | OXYGEN SATURATION: 93 % | SYSTOLIC BLOOD PRESSURE: 124 MMHG | BODY MASS INDEX: 30.16 KG/M2 | WEIGHT: 235 LBS

## 2019-04-05 DIAGNOSIS — Z00.00 HEALTH CARE MAINTENANCE: ICD-10-CM

## 2019-04-05 DIAGNOSIS — J45.909 MODERATE ASTHMA WITHOUT COMPLICATION, UNSPECIFIED WHETHER PERSISTENT: Primary | ICD-10-CM

## 2019-04-05 DIAGNOSIS — Z12.5 PROSTATE CANCER SCREENING: ICD-10-CM

## 2019-04-05 DIAGNOSIS — J44.9 COPD (CHRONIC OBSTRUCTIVE PULMONARY DISEASE) WITH CHRONIC BRONCHITIS (HCC): ICD-10-CM

## 2019-04-05 DIAGNOSIS — L30.9 DERMATITIS: ICD-10-CM

## 2019-04-05 DIAGNOSIS — G47.00 INSOMNIA, UNSPECIFIED TYPE: ICD-10-CM

## 2019-04-05 PROCEDURE — 4040F PNEUMOC VAC/ADMIN/RCVD: CPT | Performed by: INTERNAL MEDICINE

## 2019-04-05 PROCEDURE — G8427 DOCREV CUR MEDS BY ELIG CLIN: HCPCS | Performed by: INTERNAL MEDICINE

## 2019-04-05 PROCEDURE — 1123F ACP DISCUSS/DSCN MKR DOCD: CPT | Performed by: INTERNAL MEDICINE

## 2019-04-05 PROCEDURE — 1036F TOBACCO NON-USER: CPT | Performed by: INTERNAL MEDICINE

## 2019-04-05 PROCEDURE — G8926 SPIRO NO PERF OR DOC: HCPCS | Performed by: INTERNAL MEDICINE

## 2019-04-05 PROCEDURE — 3023F SPIROM DOC REV: CPT | Performed by: INTERNAL MEDICINE

## 2019-04-05 PROCEDURE — 99214 OFFICE O/P EST MOD 30 MIN: CPT | Performed by: INTERNAL MEDICINE

## 2019-04-05 PROCEDURE — G8417 CALC BMI ABV UP PARAM F/U: HCPCS | Performed by: INTERNAL MEDICINE

## 2019-04-05 PROCEDURE — 3017F COLORECTAL CA SCREEN DOC REV: CPT | Performed by: INTERNAL MEDICINE

## 2019-04-05 RX ORDER — CLOBETASOL PROPIONATE 0.5 MG/G
OINTMENT TOPICAL
Qty: 100 G | Refills: 3 | Status: SHIPPED | OUTPATIENT
Start: 2019-04-05 | End: 2021-01-01 | Stop reason: SDUPTHER

## 2019-04-05 RX ORDER — TRAZODONE HYDROCHLORIDE 150 MG/1
TABLET ORAL
Qty: 90 TABLET | Refills: 3 | Status: SHIPPED | OUTPATIENT
Start: 2019-04-05 | End: 2020-04-16 | Stop reason: SDUPTHER

## 2019-04-05 ASSESSMENT — PATIENT HEALTH QUESTIONNAIRE - PHQ9
SUM OF ALL RESPONSES TO PHQ QUESTIONS 1-9: 0
SUM OF ALL RESPONSES TO PHQ QUESTIONS 1-9: 0
2. FEELING DOWN, DEPRESSED OR HOPELESS: 0
1. LITTLE INTEREST OR PLEASURE IN DOING THINGS: 0
SUM OF ALL RESPONSES TO PHQ9 QUESTIONS 1 & 2: 0

## 2019-04-05 ASSESSMENT — ENCOUNTER SYMPTOMS
EYE DISCHARGE: 0
ABDOMINAL PAIN: 0
CHEST TIGHTNESS: 0
COLOR CHANGE: 0
EYE PAIN: 0
EYE ITCHING: 0
COUGH: 0
BACK PAIN: 0
SHORTNESS OF BREATH: 1
DIARRHEA: 0
CHOKING: 0
CONSTIPATION: 0
APNEA: 0
BLOOD IN STOOL: 0
ABDOMINAL DISTENTION: 0
EYE REDNESS: 0

## 2019-04-05 NOTE — PROGRESS NOTES
Subjective:      Chief Complaint   Patient presents with    COPD     pt will like to discuss his COPD medications        Patient ID: Ivonne Watson is a 71 y.o. male. Visit Information    Have you changed or started any medications since your last visit including any over-the-counter medicines, vitamins, or herbal medicines? no   Are you having any side effects from any of your medications? -  no  Have you stopped taking any of your medications? Is so, why? -  no    Have you seen any other physician or provider since your last visit? No  Have you had any other diagnostic tests since your last visit? No  Have you been seen in the emergency room and/or had an admission to a hospital since we last saw you? No  Have you had your routine dental cleaning in the past 6 months? no    Have you activated your [employer] account? If not, what are your barriers? Yes     Patient Care Team:  Merry Sykes MD as PCP - General (Internal Medicine)  Tyron Conde MD as PCP - [facility] Attributed Provider    Medical History Review  Past Medical, Family, and Social History reviewed and does not contribute to the patient presenting condition    Health Maintenance   Topic Date Due    AAA screen  1955    HIV screen  03/06/1969    DTaP/Tdap/Td vaccine (1 - Tdap) 03/06/1973    Shingles Vaccine (1 of 2) 03/06/2004    Lipid screen  02/09/2017    Pneumococcal 65+ years Vaccine (1 of 2 - PCV13) 03/06/2019    Colon cancer screen colonoscopy  08/29/2028    Flu vaccine  Completed    Hepatitis C screen  Completed     HPI-  patient is here for evaluation multiple medical problem. He has shortness of breath which is going on for long period of time. Patient mentioned that his shortness of breath started very early childhood, he also has seasonal allergies. He shortness of breath Worse with change in season, he has history of smoking 20 pack years. He had pulmonary function test done more than 10 years ago.   Patient is unsure whether he was told that he has asthma or COPD. He had EGD done recently and he was told that he has fungal infection, likely complication of steroid inhaler. Patient wanted to discuss options about his inhalers and his breathing troubles. Review of Systems   Constitutional: Negative for activity change, appetite change, chills, diaphoresis, fatigue and fever. HENT: Negative for congestion, dental problem, drooling and ear discharge. Eyes: Negative for pain, discharge, redness and itching. Respiratory: Positive for shortness of breath (With exertion). Negative for apnea, cough, choking and chest tightness. Cardiovascular: Negative for chest pain and leg swelling. Gastrointestinal: Negative for abdominal distention, abdominal pain, blood in stool, constipation and diarrhea. Endocrine: Negative for cold intolerance and heat intolerance. Genitourinary: Negative for difficulty urinating, dysuria, enuresis, flank pain and frequency. Musculoskeletal: Negative for arthralgias, back pain, gait problem and joint swelling. Skin: Positive for rash (Allergic rash in both hands). Negative for color change and pallor. Neurological: Negative for dizziness, facial asymmetry, light-headedness, numbness and headaches. Psychiatric/Behavioral: Positive for sleep disturbance. Negative for agitation, behavioral problems, confusion, decreased concentration and dysphoric mood. Objective:   Physical Exam   Constitutional: He is oriented to person, place, and time. He appears well-developed and well-nourished. HENT:   Head: Normocephalic and atraumatic. Mouth/Throat: No oropharyngeal exudate. Eyes: Pupils are equal, round, and reactive to light. Conjunctivae are normal. Right eye exhibits no discharge. Left eye exhibits no discharge. No scleral icterus. Neck: Normal range of motion. Neck supple. No JVD present. No tracheal deviation present. No thyromegaly present.    Cardiovascular: Normal rate and normal heart sounds. Exam reveals no gallop. No murmur heard. Pulmonary/Chest: Effort normal and breath sounds normal. No stridor. No respiratory distress. He has no wheezes. He has no rales. He exhibits no tenderness. Abdominal: Soft. Bowel sounds are normal. He exhibits no distension. There is no tenderness. There is no rebound and no guarding. Musculoskeletal: Normal range of motion. He exhibits no edema. Neurological: He is alert and oriented to person, place, and time. Skin: He is not diaphoretic. Assessment / Plan:   1. Moderate asthma without complication, unspecified whether persistent  Reinforced about importance of gargling after using steroid inhaler  - Spacer/Aero Chamber Mouthpiece MISC; 1 each by Does not apply route 2 times daily  Dispense: 1 each; Refill: 0  - Full PFT Study With Bronchodilator; Future    2. Dermatitis  - clobetasol (TEMOVATE) 0.05 % ointment; Apply topically 2 times daily. Dispense: 100 g; Refill: 3  - Amb External Referral To Allergy    3. Insomnia, unspecified type  - traZODone (DESYREL) 150 MG tablet; TAKE 1 TABLET AT BEDTIME  Dispense: 90 tablet; Refill: 3    4. Health care maintenance    - Comprehensive Metabolic Panel; Future  - Lipid Panel; Future  - Hemoglobin A1C; Future    5. Prostate cancer screening  - PSA Screening; Future      · Return in about 3 months (around 7/5/2019). · Reviewed prior labs and health maintenance. · Discussed use, benefit, and side effects of prescribed medications. Barriers to medication compliance addressed. All patient questions answered. Pt voiced understanding. MD [facility]  4/5/2019, 1:11 PM    Please note that this chart was generated using voice recognition Dragon dictation software. Although every effort was made to ensure the accuracy of this automated transcription, some errors in transcription may have occurred.

## 2019-04-10 ENCOUNTER — HOSPITAL ENCOUNTER (OUTPATIENT)
Age: 64
Setting detail: SPECIMEN
Discharge: HOME | End: 2019-04-10
Payer: MEDICARE

## 2019-04-10 DIAGNOSIS — Z12.5 PROSTATE CANCER SCREENING: ICD-10-CM

## 2019-04-10 DIAGNOSIS — Z00.00 HEALTH CARE MAINTENANCE: ICD-10-CM

## 2019-04-10 LAB
ALBUMIN SERPL-MCNC: 4.4 G/DL (ref 3.5–5.2)
ALBUMIN/GLOBULIN RATIO: 1.7 (ref 1–2.5)
ALP BLD-CCNC: 39 U/L (ref 40–129)
ALT SERPL-CCNC: 14 U/L (ref 5–41)
ANION GAP SERPL CALCULATED.3IONS-SCNC: 11 MMOL/L (ref 9–17)
AST SERPL-CCNC: 15 U/L
BILIRUB SERPL-MCNC: 0.57 MG/DL (ref 0.3–1.2)
BUN BLDV-MCNC: 14 MG/DL (ref 8–23)
BUN/CREAT BLD: ABNORMAL (ref 9–20)
CALCIUM SERPL-MCNC: 9.5 MG/DL (ref 8.6–10.4)
CHLORIDE BLD-SCNC: 105 MMOL/L (ref 98–107)
CHOLESTEROL/HDL RATIO: 3.7
CHOLESTEROL: 194 MG/DL
CO2: 27 MMOL/L (ref 20–31)
CREAT SERPL-MCNC: 0.76 MG/DL (ref 0.7–1.2)
ESTIMATED AVERAGE GLUCOSE: 120 MG/DL
GFR AFRICAN AMERICAN: >60 ML/MIN
GFR NON-AFRICAN AMERICAN: >60 ML/MIN
GFR SERPL CREATININE-BSD FRML MDRD: ABNORMAL ML/MIN/{1.73_M2}
GFR SERPL CREATININE-BSD FRML MDRD: ABNORMAL ML/MIN/{1.73_M2}
GLUCOSE BLD-MCNC: 101 MG/DL (ref 70–99)
HBA1C MFR BLD: 5.8 % (ref 4–6)
HDLC SERPL-MCNC: 52 MG/DL
LDL CHOLESTEROL: 116 MG/DL (ref 0–130)
POTASSIUM SERPL-SCNC: 4.8 MMOL/L (ref 3.7–5.3)
PROSTATE SPECIFIC ANTIGEN: 1.21 UG/L
SODIUM BLD-SCNC: 143 MMOL/L (ref 135–144)
TOTAL PROTEIN: 7 G/DL (ref 6.4–8.3)
TRIGL SERPL-MCNC: 130 MG/DL
VLDLC SERPL CALC-MCNC: NORMAL MG/DL (ref 1–30)

## 2019-04-11 DIAGNOSIS — J45.30 MILD PERSISTENT ASTHMA WITHOUT COMPLICATION: Primary | ICD-10-CM

## 2019-05-02 DIAGNOSIS — J45.909 MODERATE ASTHMA WITHOUT COMPLICATION, UNSPECIFIED WHETHER PERSISTENT: ICD-10-CM

## 2019-05-31 DIAGNOSIS — J45.30 MILD PERSISTENT ASTHMA WITHOUT COMPLICATION: ICD-10-CM

## 2019-05-31 RX ORDER — ALBUTEROL SULFATE 90 UG/1
1 AEROSOL, METERED RESPIRATORY (INHALATION) EVERY 4 HOURS PRN
Qty: 3 INHALER | Refills: 3 | Status: SHIPPED | OUTPATIENT
Start: 2019-05-31 | End: 2020-12-18

## 2019-07-05 ENCOUNTER — OFFICE VISIT (OUTPATIENT)
Dept: INTERNAL MEDICINE CLINIC | Age: 64
End: 2019-07-05
Payer: MEDICARE

## 2019-07-05 VITALS
DIASTOLIC BLOOD PRESSURE: 80 MMHG | SYSTOLIC BLOOD PRESSURE: 131 MMHG | WEIGHT: 176 LBS | BODY MASS INDEX: 22.59 KG/M2 | OXYGEN SATURATION: 97 % | HEIGHT: 74 IN | HEART RATE: 77 BPM

## 2019-07-05 DIAGNOSIS — B37.81 CANDIDA ESOPHAGITIS (HCC): ICD-10-CM

## 2019-07-05 DIAGNOSIS — J44.9 COPD (CHRONIC OBSTRUCTIVE PULMONARY DISEASE) WITH CHRONIC BRONCHITIS (HCC): ICD-10-CM

## 2019-07-05 DIAGNOSIS — E78.5 HYPERLIPIDEMIA, UNSPECIFIED HYPERLIPIDEMIA TYPE: Primary | ICD-10-CM

## 2019-07-05 DIAGNOSIS — K21.00 GASTROESOPHAGEAL REFLUX DISEASE WITH ESOPHAGITIS: ICD-10-CM

## 2019-07-05 PROCEDURE — 1123F ACP DISCUSS/DSCN MKR DOCD: CPT | Performed by: INTERNAL MEDICINE

## 2019-07-05 PROCEDURE — G8427 DOCREV CUR MEDS BY ELIG CLIN: HCPCS | Performed by: INTERNAL MEDICINE

## 2019-07-05 PROCEDURE — 3023F SPIROM DOC REV: CPT | Performed by: INTERNAL MEDICINE

## 2019-07-05 PROCEDURE — G8926 SPIRO NO PERF OR DOC: HCPCS | Performed by: INTERNAL MEDICINE

## 2019-07-05 PROCEDURE — 3017F COLORECTAL CA SCREEN DOC REV: CPT | Performed by: INTERNAL MEDICINE

## 2019-07-05 PROCEDURE — 99214 OFFICE O/P EST MOD 30 MIN: CPT | Performed by: INTERNAL MEDICINE

## 2019-07-05 PROCEDURE — 1036F TOBACCO NON-USER: CPT | Performed by: INTERNAL MEDICINE

## 2019-07-05 PROCEDURE — G8420 CALC BMI NORM PARAMETERS: HCPCS | Performed by: INTERNAL MEDICINE

## 2019-07-05 PROCEDURE — 4040F PNEUMOC VAC/ADMIN/RCVD: CPT | Performed by: INTERNAL MEDICINE

## 2019-07-05 RX ORDER — PANTOPRAZOLE SODIUM 40 MG/1
40 TABLET, DELAYED RELEASE ORAL
Qty: 90 TABLET | Refills: 1 | Status: SHIPPED | OUTPATIENT
Start: 2019-07-05 | End: 2020-01-21

## 2019-07-05 RX ORDER — ROSUVASTATIN CALCIUM 10 MG/1
10 TABLET, COATED ORAL NIGHTLY
Qty: 30 TABLET | Refills: 3 | Status: SHIPPED | OUTPATIENT
Start: 2019-07-05 | End: 2019-08-21 | Stop reason: ALTCHOICE

## 2019-07-05 RX ORDER — AZELASTINE HCL 205.5 UG/1
2 SPRAY NASAL 2 TIMES DAILY
COMMUNITY
End: 2021-01-01 | Stop reason: DRUGHIGH

## 2019-07-05 RX ORDER — MONTELUKAST SODIUM 10 MG/1
10 TABLET ORAL NIGHTLY
COMMUNITY
End: 2021-01-01 | Stop reason: SDUPTHER

## 2019-07-05 ASSESSMENT — ENCOUNTER SYMPTOMS
FACIAL SWELLING: 0
CHEST TIGHTNESS: 0
SHORTNESS OF BREATH: 1
ABDOMINAL DISTENTION: 0
BACK PAIN: 0
TROUBLE SWALLOWING: 1
COLOR CHANGE: 0
CONSTIPATION: 0
ABDOMINAL PAIN: 0
WHEEZING: 0
COUGH: 0
DIARRHEA: 0
APNEA: 0

## 2019-07-05 NOTE — PROGRESS NOTES
Subjective:      Chief Complaint   Patient presents with   Nery Cue Results     wants to go over results        Visit Information    Have you changed or started any medications since your last visit including any over-the-counter medicines, vitamins, or herbal medicines? yes      Are you having any side effects from any of your medications? -  no  Have you stopped taking any of your medications? Is so, why? -  no    Have you seen any other physician or provider since your last visit? Yes - Records Obtained  Have you had any other diagnostic tests since your last visit? No  Have you been seen in the emergency room and/or had an admission to a hospital since we last saw you? No  Have you had your routine dental cleaning in the past 6 months? no    Have you activated your [employer] account? If not, what are your barriers? Yes     Patient Care Team:  Daniela Yates MD as PCP - General (Internal Medicine)  Daniela Yates MD as PCP - [facility]    Medical History Review  Past Medical, Family, and Social History reviewed and does not contribute to the patient presenting condition    Health Maintenance   Topic Date Due    AAA screen  1955    HIV screen  03/06/1969    DTaP/Tdap/Td vaccine (1 - Tdap) 03/06/1973    Shingles Vaccine (1 of 2) 03/06/2004    Annual Wellness Visit (AWV)  03/06/2017    Pneumococcal 65+ years Vaccine (1 of 2 - PCV13) 03/06/2019    Flu vaccine (1) 09/01/2019    A1C test (Diabetic or Prediabetic)  04/10/2020    Lipid screen  04/10/2024    Colon cancer screen colonoscopy  08/29/2028    Hepatitis C screen  Completed     Patient ID: Jenelle Whittaker is a 71 y.o. male.     HPI- patient is here for evaluation of Multiple medical Problems , he has PFT done suggestive of Mild Degree of COPD , on Breo-ellipta, feels that his SOB has Improved   Patient had lab work done, his ASCVD score is high  He mentioned that he has Difficulty in swallowing , symptoms going on for last 1

## 2019-07-24 ENCOUNTER — TELEPHONE (OUTPATIENT)
Dept: INTERNAL MEDICINE CLINIC | Age: 64
End: 2019-07-24

## 2019-08-21 ENCOUNTER — OFFICE VISIT (OUTPATIENT)
Dept: INTERNAL MEDICINE CLINIC | Age: 64
End: 2019-08-21
Payer: MEDICARE

## 2019-08-21 VITALS
DIASTOLIC BLOOD PRESSURE: 64 MMHG | BODY MASS INDEX: 30.42 KG/M2 | HEIGHT: 74 IN | SYSTOLIC BLOOD PRESSURE: 122 MMHG | WEIGHT: 237 LBS

## 2019-08-21 DIAGNOSIS — K70.9 LIVER DISEASE, CHRONIC, DUE TO ALCOHOL (HCC): ICD-10-CM

## 2019-08-21 DIAGNOSIS — J44.9 COPD (CHRONIC OBSTRUCTIVE PULMONARY DISEASE) WITH CHRONIC BRONCHITIS (HCC): ICD-10-CM

## 2019-08-21 DIAGNOSIS — E78.5 HYPERLIPIDEMIA, UNSPECIFIED HYPERLIPIDEMIA TYPE: Primary | ICD-10-CM

## 2019-08-21 DIAGNOSIS — K21.00 GASTROESOPHAGEAL REFLUX DISEASE WITH ESOPHAGITIS: ICD-10-CM

## 2019-08-21 DIAGNOSIS — I47.1 PAROXYSMAL SVT (SUPRAVENTRICULAR TACHYCARDIA) (HCC): ICD-10-CM

## 2019-08-21 PROCEDURE — G8417 CALC BMI ABV UP PARAM F/U: HCPCS | Performed by: INTERNAL MEDICINE

## 2019-08-21 PROCEDURE — 3023F SPIROM DOC REV: CPT | Performed by: INTERNAL MEDICINE

## 2019-08-21 PROCEDURE — 4040F PNEUMOC VAC/ADMIN/RCVD: CPT | Performed by: INTERNAL MEDICINE

## 2019-08-21 PROCEDURE — G8427 DOCREV CUR MEDS BY ELIG CLIN: HCPCS | Performed by: INTERNAL MEDICINE

## 2019-08-21 PROCEDURE — G8926 SPIRO NO PERF OR DOC: HCPCS | Performed by: INTERNAL MEDICINE

## 2019-08-21 PROCEDURE — 1123F ACP DISCUSS/DSCN MKR DOCD: CPT | Performed by: INTERNAL MEDICINE

## 2019-08-21 PROCEDURE — 3017F COLORECTAL CA SCREEN DOC REV: CPT | Performed by: INTERNAL MEDICINE

## 2019-08-21 PROCEDURE — 1036F TOBACCO NON-USER: CPT | Performed by: INTERNAL MEDICINE

## 2019-08-21 PROCEDURE — 99214 OFFICE O/P EST MOD 30 MIN: CPT | Performed by: INTERNAL MEDICINE

## 2019-08-21 RX ORDER — ATORVASTATIN CALCIUM 20 MG/1
20 TABLET, FILM COATED ORAL DAILY
Qty: 30 TABLET | Refills: 3 | Status: SHIPPED | OUTPATIENT
Start: 2019-08-21 | End: 2020-09-16 | Stop reason: SDUPTHER

## 2019-09-04 ASSESSMENT — ENCOUNTER SYMPTOMS
APNEA: 0
BACK PAIN: 0
ABDOMINAL DISTENTION: 0
DIARRHEA: 0
BLOOD IN STOOL: 0
CHOKING: 0
EYE ITCHING: 0
EYE DISCHARGE: 0
ABDOMINAL PAIN: 0
SHORTNESS OF BREATH: 0
COLOR CHANGE: 0
CONSTIPATION: 0
COUGH: 0
EYE PAIN: 0
CHEST TIGHTNESS: 0
EYE REDNESS: 0

## 2019-10-21 ENCOUNTER — TELEPHONE (OUTPATIENT)
Dept: PHARMACY | Facility: CLINIC | Age: 64
End: 2019-10-21

## 2020-01-21 RX ORDER — PANTOPRAZOLE SODIUM 40 MG/1
TABLET, DELAYED RELEASE ORAL
Qty: 90 TABLET | Refills: 5 | Status: SHIPPED | OUTPATIENT
Start: 2020-01-21 | End: 2021-02-08

## 2020-04-01 ENCOUNTER — TELEPHONE (OUTPATIENT)
Dept: PHARMACY | Facility: CLINIC | Age: 65
End: 2020-04-01

## 2020-04-17 RX ORDER — TRAZODONE HYDROCHLORIDE 150 MG/1
TABLET ORAL
Qty: 90 TABLET | Refills: 3 | Status: SHIPPED | OUTPATIENT
Start: 2020-04-17 | End: 2020-09-16

## 2020-04-17 RX ORDER — TRAZODONE HYDROCHLORIDE 150 MG/1
TABLET ORAL
Qty: 90 TABLET | Refills: 3 | Status: SHIPPED | OUTPATIENT
Start: 2020-04-17 | End: 2021-04-22 | Stop reason: SDUPTHER

## 2020-04-23 NOTE — TELEPHONE ENCOUNTER
Following up on previous conversation with Abdon Andrade regarding statin. Left message to return call.

## 2020-04-27 NOTE — TELEPHONE ENCOUNTER
Dr. Jorgito Condon,    [address] as Ami Camacho. Reached out to patient regarding atorvastatin adherence about a month ago. At the time he admitted to not taking medication and states it was due to stomach pains after taking it. He stated he planned to follow up with you regarding this. Unable to reach patient to review again. He had muscle pain with rosuvastatin, but none with atorvastatin, just the stomach pain. Perhaps patient could be switched to pravastatin or other statin at a future appointment as you find appropriate? Please let me know if I can attempt to perform any further outreach.     Thank you,  Meryl Boxer, PharmD, [address] Pharmacist  O: [phone]  Department, toll free: [phone], option 7

## 2020-06-09 RX ORDER — ALBUTEROL SULFATE 90 UG/1
AEROSOL, METERED RESPIRATORY (INHALATION)
Qty: 25.5 G | OUTPATIENT
Start: 2020-06-09

## 2020-06-10 ENCOUNTER — E-VISIT (OUTPATIENT)
Dept: INTERNAL MEDICINE CLINIC | Age: 65
End: 2020-06-10
Payer: MEDICARE

## 2020-06-10 PROCEDURE — 99421 OL DIG E/M SVC 5-10 MIN: CPT | Performed by: INTERNAL MEDICINE

## 2020-06-10 RX ORDER — ALBUTEROL SULFATE 90 UG/1
2 AEROSOL, METERED RESPIRATORY (INHALATION) EVERY 6 HOURS PRN
Qty: 1 INHALER | Refills: 3 | Status: SHIPPED | OUTPATIENT
Start: 2020-06-10 | End: 2020-09-16

## 2020-09-16 ENCOUNTER — OFFICE VISIT (OUTPATIENT)
Dept: INTERNAL MEDICINE CLINIC | Age: 65
End: 2020-09-16
Payer: MEDICARE

## 2020-09-16 VITALS
WEIGHT: 244.6 LBS | HEIGHT: 74 IN | SYSTOLIC BLOOD PRESSURE: 120 MMHG | DIASTOLIC BLOOD PRESSURE: 82 MMHG | BODY MASS INDEX: 31.39 KG/M2 | TEMPERATURE: 98.4 F | HEART RATE: 73 BPM | OXYGEN SATURATION: 96 %

## 2020-09-16 PROCEDURE — 3017F COLORECTAL CA SCREEN DOC REV: CPT | Performed by: PHYSICIAN ASSISTANT

## 2020-09-16 PROCEDURE — 4040F PNEUMOC VAC/ADMIN/RCVD: CPT | Performed by: PHYSICIAN ASSISTANT

## 2020-09-16 PROCEDURE — G0438 PPPS, INITIAL VISIT: HCPCS | Performed by: PHYSICIAN ASSISTANT

## 2020-09-16 PROCEDURE — 90471 IMMUNIZATION ADMIN: CPT | Performed by: PHYSICIAN ASSISTANT

## 2020-09-16 PROCEDURE — 90715 TDAP VACCINE 7 YRS/> IM: CPT | Performed by: PHYSICIAN ASSISTANT

## 2020-09-16 PROCEDURE — 1123F ACP DISCUSS/DSCN MKR DOCD: CPT | Performed by: PHYSICIAN ASSISTANT

## 2020-09-16 RX ORDER — ATORVASTATIN CALCIUM 20 MG/1
20 TABLET, FILM COATED ORAL DAILY
Qty: 30 TABLET | Refills: 3 | Status: SHIPPED | OUTPATIENT
Start: 2020-09-16 | End: 2021-01-18 | Stop reason: SDUPTHER

## 2020-09-16 ASSESSMENT — LIFESTYLE VARIABLES: HOW OFTEN DO YOU HAVE A DRINK CONTAINING ALCOHOL: 0

## 2020-09-16 ASSESSMENT — PATIENT HEALTH QUESTIONNAIRE - PHQ9
SUM OF ALL RESPONSES TO PHQ9 QUESTIONS 1 & 2: 1
SUM OF ALL RESPONSES TO PHQ QUESTIONS 1-9: 1
1. LITTLE INTEREST OR PLEASURE IN DOING THINGS: 1
2. FEELING DOWN, DEPRESSED OR HOPELESS: 0
SUM OF ALL RESPONSES TO PHQ QUESTIONS 1-9: 1

## 2020-09-16 NOTE — PROGRESS NOTES
Medicare Annual Wellness Visit    Name: Ignacia Duran Date: 2020   MRN: [id number] Sex: Male   Age: 76 y.o. Ethnicity: Non-/Non    : 1955 Race: Hay Reyes is here for Medicare AWV and Health Maintenance (Dtap, Flu, lipid)    Screenings for behavioral, psychosocial and functional/safety risks, and cognitive dysfunction are all negative except as indicated below. These results, as well as other patient data from the [address] form, are documented in Flowsheets linked to this Encounter. Allergies   Allergen Reactions    Seasonal Other (See Comments)     Sneezing    Cat Hair Extract Rash         Prior to Visit Medications    Medication Sig Taking? Authorizing Provider   atorvastatin (LIPITOR) 20 MG tablet Take 1 tablet by mouth daily Yes Remedios Boland PA-C   traZODone (DESYREL) 150 MG tablet TAKE 1 TABLET AT BEDTIME Yes Rodriguez Hughes MD   pantoprazole (PROTONIX) 40 MG tablet take 1 tablet by mouth every morning before breakfast Yes Rodriguez Hughes MD   BREO ELLIPTA 200-25 MCG/INH AEPB Inhale into the lungs Yes Historical Provider, MD   montelukast (SINGULAIR) 10 MG tablet Take 10 mg by mouth nightly Yes Historical Provider, MD   azelastine HCl 0.15 % SOLN 2 sprays by Nasal route 2 times daily Yes Historical Provider, MD   albuterol sulfate HFA (PROAIR HFA) 108 (90 Base) MCG/ACT inhaler Inhale 1 puff into the lungs every 4 hours as needed for Wheezing Yes Rodriguez Hughes MD   clobetasol (TEMOVATE) 0.05 % ointment Apply topically 2 times daily. Yes Rodriguez Hughes MD   nystatin (MYCOSTATIN) 101012 UNIT/ML suspension Take 5 mLs by mouth 4 times daily Yes Daniella Thomas MD   ibuprofen (ADVIL;MOTRIN) 400 MG tablet Take 400 mg by mouth every 6 hours as needed for Pain.  Yes Historical Provider, MD         Past Medical History:   Diagnosis Date    Asthma     Calculus of gallbladder without mention of cholecystitis or obstruction     Chronic back present  Hearing/Vision  Do you or your family notice any trouble with your hearing?: No  Do you have difficulty driving, watching TV, or doing any of your daily activities because of your eyesight?: No  Have you had an eye exam within the past year?: (!) No  Hearing/Vision Interventions:  · Vision concerns:  patient encouraged to make appointment with his/her eye specialist    Personalized Preventive Plan   Current Health Maintenance Status  Immunization History   Administered Date(s) Administered    Influenza Virus Vaccine 10/01/2018    Influenza, Quadv, IM, PF (6 mo and older Fluzone, Flulaval, Fluarix, and 3 yrs and older Afluria) 10/03/2018    Influenza, Quadv, Recombinant, IM PF (Flublok 18 yrs and older) 10/05/2019    Influenza, Triv, inactivated, subunit, adjuvanted, IM (Fluad 65 yrs and older) 10/09/2019    Pneumococcal Polysaccharide (Conrkwcep03) 03/27/2017    Tdap (Boostrix, Adacel) 09/16/2020      Health Maintenance   Topic Date Due    AAA screen  1955    Shingles Vaccine (1 of 2) 03/06/2004    Annual Wellness Visit (AWV)  05/29/2019    A1C test (Diabetic or Prediabetic)  04/10/2020    Lipid screen  04/10/2020    Flu vaccine (1) 09/01/2020    Pneumococcal 65+ years Vaccine (1 of 1 - PPSV23) 03/27/2022    Colon cancer screen colonoscopy  08/29/2028    DTaP/Tdap/Td vaccine (2 - Td) 09/16/2030    Hepatitis C screen  Completed    Hepatitis A vaccine  Aged Out    Hepatitis B vaccine  Aged Out    Hib vaccine  Aged Out    Meningococcal (ACWY) vaccine  Aged Out     Recommendations for Complexa Due: see orders and patient instructions/AVS.    Recommended screening schedule for the next 5-10 years is provided to the patient in written form: see Patient Instructions/AVS.    Lobo Eid was seen today for medicare awv and health maintenance. Diagnoses and all orders for this visit:    1. Routine general medical examination at a health care facility    2.  Prediabetes  - Hemoglobin A1C; Future    3. Hyperlipidemia, unspecified hyperlipidemia type  - atorvastatin (LIPITOR) 20 MG tablet; Take 1 tablet by mouth daily  Dispense: 30 tablet; Refill: 3  - Lipid, Fasting; Future    4. Screening for AAA (abdominal aortic aneurysm)  - US Abdominal Aorta Limited; Future    5. Liver disease, chronic, due to alcohol (HCC)  - Alcohol abuse in remission, no signs of cirrhosis    6. Moderate asthma without complication, unspecified whether persistent  - Stable, continue present management    7. COPD (chronic obstructive pulmonary disease) with chronic bronchitis (HCC)  - Stable, continue present management    8. Paroxysmal SVT (supraventricular tachycardia) (HCC)  - Stable, continue present management    9. Need for vaccination  - Tdap (age 6y and older) IM ([address] [place] [address] Drive Extension)    Return for regularly scheduled office visits, Medicare Annual Wellness Visit in 1 year.     CEDRIC Lewis [facility]  9/17/2020, 7:20 AM

## 2020-09-16 NOTE — PATIENT INSTRUCTIONS
Learning About Cutting Calories  How do calories affect your weight? Food gives your body energy. Energy from the food you eat is measured in calories. This energy keeps your heart beating, your brain active, and your muscles working. Your body needs a certain number of calories each day. After your body uses the calories it needs, it stores extra calories as fat. To lose weight safely, you have to eat fewer calories while eating in a healthy way. How many calories do you need each day? The more active you are, the more calories you need. When you are less active, you need fewer calories. How many calories you need each day also depends on several things, including your age and whether you are male or female. Here are some general guidelines for adults:  · Less active women and older adults need 1,600 to 2,000 calories each day. · Active women and less active men need 2,000 to 2,400 calories each day. · Active men need 2,400 to 3,000 calories each day. How can you cut calories and eat healthy meals? Whole grains, vegetables and fruits, and dried beans are good lower-calorie foods. They give you lots of nutrients and fiber. And they fill you up. Sweets, energy drinks, and soda pop are high in calories. They give you few nutrients and no fiber. Try to limit soda pop, fruit juice, and energy drinks. Drink water instead. Some fats can be part of a healthy diet. But cutting back on fats from highly processed foods like fast foods and many snack foods is a good way to lower the calories in your diet. Also, use smaller amounts of fats like butter, margarine, salad dressing, and mayonnaise. Add fresh garlic, lemon, or herbs to your meals to add flavor without adding fat. Meats and dairy products can be a big source of hidden fats. Try to choose lean or low-fat versions of these products. Fat-free cookies, candies, chips, and frozen treats can still be high in sugar and calories.  Some fat-free foods have more calories than regular ones. Eat fat-free treats in moderation, as you would other foods. If your favorite foods are high in fat, salt, sugar, or calories, limit how often you eat them. Eat smaller servings, or look for healthy substitutes. Fill up on fruits, vegetables, and whole grains. Eating at home  · Use meat as a side dish instead of as the main part of your meal.  · Try main dishes that use whole wheat pasta, brown rice, dried beans, or vegetables. · Find ways to cook with little or no fat, such as broiling, steaming, or grilling. · Use cooking spray instead of oil. If you use oil, use a monounsaturated oil, such as canola or olive oil. · Trim fat from meats before you cook them. · Drain off fat after you brown the meat or while you roast it. · Chill soups and stews after you cook them. Then skim the fat off the top after it hardens. Eating out  · Order foods that are broiled or poached rather than fried or breaded. · Cut back on the amount of butter or margarine that you use on bread. · Order sauces, gravies, and salad dressings on the side, and use only a little. · When you order pasta, choose tomato sauce rather than cream sauce. · Ask for salsa with your baked potato instead of sour cream, butter, cheese, or cook. · Order meals in a small size instead of upgrading to a large. · Share an entree, or take part of your food home to eat as another meal.  · Share appetizers and desserts. Where can you learn more? Go to https://eASICroxanne.[email or web address] org and sign in to your Ecast account. Enter X205 in the Storie box to learn more about \"Learning About Cutting Calories. \"     If you do not have an account, please click on the \"Sign Up Now\" link. Current as of: August 22, 2019               Content Version: 12.5  © 0701-2331 Healthwise, Incorporated. Care instructions adapted under license by [facility] ([facility]).  If you have questions about a medical condition or this weight means learning how much food you really need from day to day and not eating more than that. Even with healthy foods, eating too much can make you gain weight. Having a well-balanced diet means that you eat enough, but not too much, and that your food gives you the nutrients you need to stay healthy. So listen to your body. Eat when you're hungry. Stop when you feel satisfied. It's a good idea to have healthy snacks ready for when you get hungry. Keep healthy snacks with you at work, in your car, and at home. If you have a healthy snack easily available, you'll be less likely to pick a candy bar or bag of chips from a vending machine instead. Some healthy snacks you might want to keep on hand are fruit, low-fat yogurt, string cheese, low-fat microwave popcorn, raisins and other dried fruit, nuts, whole wheat crackers, pretzels, carrots, celery sticks, and broccoli. Do some physical activity   A big part of reaching and staying at a healthy weight is being active. When you're active, you burn calories. This makes it easier to reach and stay at a healthy weight. When you're active on a regular basis, your body burns more calories, even when you're at rest. Being active helps you lose fat and build lean muscle. Try to be active for at least 1 hour every day. This may sound like a lot, but it's okay to be active in smaller blocks of time that add up to 1 hour a day. Any activity that makes your heart beat faster and keeps it there for a while counts. A brisk walk, run, or swim will get your heart beating faster. So will climbing stairs, shooting baskets, or cycling. Even some household chores like vacuuming and mowing the lawn will get your heart rate up. Pick activities that you enjoy--ones that make your heart beat faster, your muscles stronger, and your muscles and joints more flexible. If you find more than one thing you like doing, do them all. You don't have to do the same thing every day.   Don't diet  Diets don't work. Diets are temporary. Because you give up so much when you diet, you may be hungry and think about food all the time. And after you stop dieting, you also may overeat to make up for what you missed. Most people who diet end up gaining back the pounds they lost--and more. Remember that healthy bodies come in lots of shapes and sizes. Everyone can get healthier by eating better and being more active. Where can you learn more? Go to https://SensGardpeBackTrack.The Movie Studio. org and sign in to your SalesFloor.it account. Enter 386 [id number] in the Fantasy Buzzer box to learn more about \"Learning About Healthy Weight. \"     If you do not have an account, please click on the \"Sign Up Now\" link. Current as of: December 11, 2019               Content Version: 12.5  © 7028-0164 Windation. Care instructions adapted under license by [facility] ([facility]). If you have questions about a medical condition or this instruction, always ask your healthcare professional. [address] any warranty or liability for your use of this information. Learning About Low-Carbohydrate Diets  What is a low-carbohydrate diet? A low-carbohydrate (or \"low-carb\") diet limits foods and drinks that have carbohydrates. This includes grains, fruits, milk and yogurt, and starchy vegetables like potatoes, beans, and corn. It also avoids foods and drinks that have added sugar. Instead, low-carb diets include foods that are high in protein and fat. Why might you follow a low-carb diet? Low-carb diets may be used for a variety of reasons, such as for weight loss. People who have diabetes may use a low-carb diet to help manage their blood sugar levels. What should you do before you start the diet? Talk to your doctor before you try any diet. This is even more important if you have health problems like kidney disease, heart disease, or diabetes.  Your doctor may suggest that you meet with a preventive dental visit is recommended every 6 months. · Try to get at least 150 minutes of exercise per week or 10,000 steps per day on a pedometer . · Order or download the FREE \"Exercise & Physical Activity: Your Everyday Guide\" from [employer]. Call 5-153.639.2799 or search [employer] online. · You need 2932-0611 mg of calcium and 0528-4365 IU of vitamin D per day. It is possible to meet your calcium requirement with diet alone, but a vitamin D supplement is usually necessary to meet this goal.  · When exposed to the sun, use a sunscreen that protects against both UVA and UVB radiation with an SPF of 30 or greater. Reapply every 2 to 3 hours or after sweating, drying off with a towel, or swimming. · Always wear a seat belt when traveling in a car. Always wear a helmet when riding a bicycle or motorcycle.

## 2020-09-18 ENCOUNTER — HOSPITAL ENCOUNTER (OUTPATIENT)
Age: 65
Setting detail: SPECIMEN
Discharge: HOME | End: 2020-09-18
Payer: MEDICARE

## 2020-09-18 LAB
CHOLESTEROL, FASTING: 143 MG/DL
CHOLESTEROL/HDL RATIO: 3.1
ESTIMATED AVERAGE GLUCOSE: 128 MG/DL
HBA1C MFR BLD: 6.1 % (ref 4–6)
HDLC SERPL-MCNC: 46 MG/DL
LDL CHOLESTEROL: 76 MG/DL (ref 0–130)
TRIGLYCERIDE, FASTING: 104 MG/DL
VLDLC SERPL CALC-MCNC: NORMAL MG/DL (ref 1–30)

## 2020-12-18 RX ORDER — ALBUTEROL SULFATE 90 UG/1
AEROSOL, METERED RESPIRATORY (INHALATION)
Qty: 8.5 G | Refills: 2 | Status: SHIPPED | OUTPATIENT
Start: 2020-12-18 | End: 2022-01-01

## 2020-12-18 RX ORDER — ALBUTEROL SULFATE 90 UG/1
1 AEROSOL, METERED RESPIRATORY (INHALATION) EVERY 4 HOURS PRN
Qty: 3 INHALER | Refills: 3 | Status: SHIPPED | OUTPATIENT
Start: 2020-12-18 | End: 2021-01-01

## 2021-01-01 ENCOUNTER — PATIENT MESSAGE (OUTPATIENT)
Dept: INTERNAL MEDICINE CLINIC | Age: 66
End: 2021-01-01

## 2021-01-01 ENCOUNTER — OFFICE VISIT (OUTPATIENT)
Dept: INTERNAL MEDICINE CLINIC | Age: 66
End: 2021-01-01
Payer: MEDICARE

## 2021-01-01 ENCOUNTER — HOSPITAL ENCOUNTER (OUTPATIENT)
Dept: VASCULAR LAB | Age: 66
Discharge: HOME | End: 2021-08-19
Payer: MEDICARE

## 2021-01-01 ENCOUNTER — TELEPHONE (OUTPATIENT)
Dept: INTERNAL MEDICINE CLINIC | Age: 66
End: 2021-01-01

## 2021-01-01 ENCOUNTER — HOSPITAL ENCOUNTER (OUTPATIENT)
Age: 66
Setting detail: SPECIMEN
Discharge: HOME | End: 2021-08-19
Payer: MEDICARE

## 2021-01-01 VITALS
SYSTOLIC BLOOD PRESSURE: 130 MMHG | WEIGHT: 233 LBS | DIASTOLIC BLOOD PRESSURE: 78 MMHG | OXYGEN SATURATION: 97 % | HEART RATE: 80 BPM | HEIGHT: 74 IN | BODY MASS INDEX: 29.9 KG/M2

## 2021-01-01 DIAGNOSIS — L30.9 DERMATITIS: ICD-10-CM

## 2021-01-01 DIAGNOSIS — G47.00 INSOMNIA, UNSPECIFIED TYPE: ICD-10-CM

## 2021-01-01 DIAGNOSIS — K70.9 LIVER DISEASE, CHRONIC, DUE TO ALCOHOL (HCC): ICD-10-CM

## 2021-01-01 DIAGNOSIS — F17.200 SMOKER: ICD-10-CM

## 2021-01-01 DIAGNOSIS — J45.41 MODERATE PERSISTENT ASTHMA WITH ACUTE EXACERBATION: Primary | ICD-10-CM

## 2021-01-01 DIAGNOSIS — E78.5 HYPERLIPIDEMIA, UNSPECIFIED HYPERLIPIDEMIA TYPE: ICD-10-CM

## 2021-01-01 DIAGNOSIS — I47.1 PAROXYSMAL SVT (SUPRAVENTRICULAR TACHYCARDIA) (HCC): Primary | ICD-10-CM

## 2021-01-01 DIAGNOSIS — F17.200 SMOKER: Primary | ICD-10-CM

## 2021-01-01 DIAGNOSIS — J44.9 COPD (CHRONIC OBSTRUCTIVE PULMONARY DISEASE) WITH CHRONIC BRONCHITIS (HCC): ICD-10-CM

## 2021-01-01 DIAGNOSIS — Z13.6 SCREENING FOR AAA (AORTIC ABDOMINAL ANEURYSM): ICD-10-CM

## 2021-01-01 DIAGNOSIS — J45.41 MODERATE PERSISTENT ASTHMA WITH ACUTE EXACERBATION: ICD-10-CM

## 2021-01-01 LAB
ALBUMIN SERPL-MCNC: 4 G/DL (ref 3.5–5.2)
ALBUMIN/GLOBULIN RATIO: 1.5 (ref 1–2.5)
ALP BLD-CCNC: 69 U/L (ref 40–129)
ALT SERPL-CCNC: 14 U/L (ref 5–41)
ANION GAP SERPL CALCULATED.3IONS-SCNC: 14 MMOL/L (ref 9–17)
AST SERPL-CCNC: 14 U/L
BILIRUB SERPL-MCNC: 0.64 MG/DL (ref 0.3–1.2)
BUN BLDV-MCNC: 12 MG/DL (ref 8–23)
BUN/CREAT BLD: ABNORMAL (ref 9–20)
CALCIUM SERPL-MCNC: 8.7 MG/DL (ref 8.6–10.4)
CHLORIDE BLD-SCNC: 102 MMOL/L (ref 98–107)
CHOLESTEROL/HDL RATIO: 2.4
CHOLESTEROL: 100 MG/DL
CO2: 24 MMOL/L (ref 20–31)
CREAT SERPL-MCNC: 0.96 MG/DL (ref 0.7–1.2)
GFR AFRICAN AMERICAN: >60 ML/MIN
GFR NON-AFRICAN AMERICAN: >60 ML/MIN
GFR SERPL CREATININE-BSD FRML MDRD: ABNORMAL ML/MIN/{1.73_M2}
GFR SERPL CREATININE-BSD FRML MDRD: ABNORMAL ML/MIN/{1.73_M2}
GLUCOSE BLD-MCNC: 105 MG/DL (ref 70–99)
HDLC SERPL-MCNC: 41 MG/DL
LDL CHOLESTEROL: 43 MG/DL (ref 0–130)
POTASSIUM SERPL-SCNC: 4.2 MMOL/L (ref 3.7–5.3)
SODIUM BLD-SCNC: 140 MMOL/L (ref 135–144)
TOTAL PROTEIN: 6.6 G/DL (ref 6.4–8.3)
TRIGL SERPL-MCNC: 82 MG/DL
VLDLC SERPL CALC-MCNC: NORMAL MG/DL (ref 1–30)

## 2021-01-01 PROCEDURE — 3017F COLORECTAL CA SCREEN DOC REV: CPT | Performed by: INTERNAL MEDICINE

## 2021-01-01 PROCEDURE — 99214 OFFICE O/P EST MOD 30 MIN: CPT | Performed by: INTERNAL MEDICINE

## 2021-01-01 PROCEDURE — 1036F TOBACCO NON-USER: CPT | Performed by: INTERNAL MEDICINE

## 2021-01-01 PROCEDURE — G8926 SPIRO NO PERF OR DOC: HCPCS | Performed by: INTERNAL MEDICINE

## 2021-01-01 PROCEDURE — 3023F SPIROM DOC REV: CPT | Performed by: INTERNAL MEDICINE

## 2021-01-01 PROCEDURE — 4040F PNEUMOC VAC/ADMIN/RCVD: CPT | Performed by: INTERNAL MEDICINE

## 2021-01-01 PROCEDURE — 1123F ACP DISCUSS/DSCN MKR DOCD: CPT | Performed by: INTERNAL MEDICINE

## 2021-01-01 PROCEDURE — G8417 CALC BMI ABV UP PARAM F/U: HCPCS | Performed by: INTERNAL MEDICINE

## 2021-01-01 PROCEDURE — 76706 US ABDL AORTA SCREEN AAA: CPT

## 2021-01-01 PROCEDURE — G8427 DOCREV CUR MEDS BY ELIG CLIN: HCPCS | Performed by: INTERNAL MEDICINE

## 2021-01-01 RX ORDER — TRAZODONE HYDROCHLORIDE 150 MG/1
TABLET ORAL
Qty: 30 TABLET | Refills: 0 | Status: SHIPPED | OUTPATIENT
Start: 2021-01-01 | End: 2022-01-01 | Stop reason: SDUPTHER

## 2021-01-01 RX ORDER — CLINDAMYCIN HYDROCHLORIDE 150 MG/1
CAPSULE ORAL
COMMUNITY
Start: 2021-01-01

## 2021-01-01 RX ORDER — ATORVASTATIN CALCIUM 20 MG/1
TABLET, FILM COATED ORAL
Qty: 90 TABLET | Refills: 1 | Status: SHIPPED | OUTPATIENT
Start: 2021-01-01 | End: 2022-01-01 | Stop reason: SDUPTHER

## 2021-01-01 RX ORDER — TRAZODONE HYDROCHLORIDE 150 MG/1
TABLET ORAL
Qty: 30 TABLET | Refills: 0 | Status: SHIPPED | OUTPATIENT
Start: 2021-01-01 | End: 2021-01-01

## 2021-01-01 RX ORDER — CLOBETASOL PROPIONATE 0.5 MG/G
OINTMENT TOPICAL
Qty: 100 G | Refills: 3 | Status: SHIPPED | OUTPATIENT
Start: 2021-01-01

## 2021-01-01 RX ORDER — PENICILLIN V POTASSIUM 500 MG/1
TABLET ORAL
COMMUNITY
Start: 2021-01-01

## 2021-01-01 RX ORDER — IBUPROFEN 800 MG/1
TABLET ORAL
COMMUNITY
Start: 2021-01-01

## 2021-01-01 ASSESSMENT — ENCOUNTER SYMPTOMS
COUGH: 0
SHORTNESS OF BREATH: 1
ABDOMINAL DISTENTION: 0
ABDOMINAL PAIN: 0
COLOR CHANGE: 0
APNEA: 0
CHEST TIGHTNESS: 0
BACK PAIN: 0
WHEEZING: 0
FACIAL SWELLING: 0
DIARRHEA: 0
CONSTIPATION: 0

## 2021-01-01 ASSESSMENT — PATIENT HEALTH QUESTIONNAIRE - PHQ9
SUM OF ALL RESPONSES TO PHQ9 QUESTIONS 1 & 2: 0
SUM OF ALL RESPONSES TO PHQ QUESTIONS 1-9: 0
2. FEELING DOWN, DEPRESSED OR HOPELESS: 0
SUM OF ALL RESPONSES TO PHQ QUESTIONS 1-9: 0
SUM OF ALL RESPONSES TO PHQ QUESTIONS 1-9: 0
1. LITTLE INTEREST OR PLEASURE IN DOING THINGS: 0

## 2021-01-18 DIAGNOSIS — E78.5 HYPERLIPIDEMIA, UNSPECIFIED HYPERLIPIDEMIA TYPE: ICD-10-CM

## 2021-01-18 RX ORDER — ATORVASTATIN CALCIUM 20 MG/1
20 TABLET, FILM COATED ORAL DAILY
Qty: 30 TABLET | Refills: 3 | Status: SHIPPED | OUTPATIENT
Start: 2021-01-18 | End: 2021-02-19 | Stop reason: SDUPTHER

## 2021-02-06 DIAGNOSIS — K21.00 GASTROESOPHAGEAL REFLUX DISEASE WITH ESOPHAGITIS: ICD-10-CM

## 2021-02-08 RX ORDER — PANTOPRAZOLE SODIUM 40 MG/1
TABLET, DELAYED RELEASE ORAL
Qty: 90 TABLET | Refills: 5 | Status: SHIPPED | OUTPATIENT
Start: 2021-02-08 | End: 2022-01-01

## 2021-02-17 ENCOUNTER — TELEPHONE (OUTPATIENT)
Dept: INTERNAL MEDICINE CLINIC | Age: 66
End: 2021-02-17

## 2021-02-17 DIAGNOSIS — E78.5 HYPERLIPIDEMIA, UNSPECIFIED HYPERLIPIDEMIA TYPE: ICD-10-CM

## 2021-02-17 NOTE — TELEPHONE ENCOUNTER
POPULATION HEALTH CLINICAL PHARMACY REVIEW: ADHERENCE REVIEW  Identified care gap per Micaela; fills at [facility]: Statin adherence    Last Office Visit: 20    ASSESSMENT  STATIN ADHERENCE    Per Insurance Records   ATORVASTATIN TAB 20MG last filled on 20 for a 30 day supply; SI tab daily;     Per [facility] pharmacy:  last picked up on 20. 3 refills remaining for 30 d/s. Billed through [facility]. Lab Results   Component Value Date    CHOL 194 04/10/2019    TRIG 130 04/10/2019    HDL 46 2020    LDLCHOLESTEROL 76 2020     ALT   Date Value Ref Range Status   04/10/2019 14 5 - 41 U/L Final     AST   Date Value Ref Range Status   04/10/2019 15 <40 U/L Final     The 10-year ASCVD risk score (Pedro Santoyo, et al., 2013) is: 10.4%    Values used to calculate the score:      Age: 76 years      Sex: Male      Is Non- : No      Diabetic: No      Tobacco smoker: No      Systolic Blood Pressure: 753 mmHg      Is BP treated: No      HDL Cholesterol: 46 mg/dL      Total Cholesterol: 143 mg/dL     PLAN    Left  for patient to call back regarding if wanting rx of 90 d/s of Atorvastatin sent to pharmacy. If not then 30 d/s is available to . Pharmacy stated they cannot combine refills for 90 d/s and would need new prescription from PCP in order to fill for 90 d/s. Spoke to patient on second attempt. He stated that he would prefer 90 d/s prescription for Atorvastatin since all other medications are written for 90 d/s. Routing to [facility] to attempt to get new rx.

## 2021-02-19 RX ORDER — ATORVASTATIN CALCIUM 20 MG/1
20 TABLET, FILM COATED ORAL DAILY
Qty: 90 TABLET | Refills: 1 | Status: SHIPPED | OUTPATIENT
Start: 2021-02-19 | End: 2021-01-01

## 2021-02-19 NOTE — TELEPHONE ENCOUNTER
Sidney Navarro MD - OK to change to 90 day Rx for atorvastatin? Pt prefers 90 day Rx if OK with you. Order pending for your convenience. LOV 9/16/20 Shalini cast AWV)  Next to be scheduled    Thank you!   Ezequiel Dunne, PharmD, [facility]  Department, toll free: [phone], option 7     Component      Latest Ref Rng & Units 9/18/2020           9:10 AM   Cholesterol, Fasting      <200 mg/dL 143   HDL Cholesterol      >40 mg/dL 46   LDL Cholesterol      0 - 130 mg/dL 76   Chol/HDL Ratio      <5 3.1   Triglyceride, Fasting      <150 mg/dL 104   VLDL      1 - 30 mg/dL NOT REPORTED

## 2021-02-19 NOTE — TELEPHONE ENCOUNTER
CLINICAL PHARMACY CONSULT: MED RECONCILIATION/REVIEW ADDENDUM    For Pharmacy Admin Tracking Only    PHSO: Yes  Total # of Interventions Recommended: 1  - New Order #: 1 New Medication Order Reason(s):  Adherence  - Maintenance Safety Lab Monitoring #: 1  - New Therapy Lab Monitoring #: 1  Recommended intervention potential cost savings: 1  Total Interventions Accepted: 1  Time Spent (min): 1301 Marcin HECTOR, PharmD  [address]

## 2021-02-22 NOTE — TELEPHONE ENCOUNTER
Called pharmacy and confirmed they received new prescription for Atorvastatin for 90 d/s and to discontinue 30 d/s prescription from file. Spoke to patient after and let him know that 90 d/s was available at pharmacy. No further action needed.

## 2021-04-22 DIAGNOSIS — G47.00 INSOMNIA, UNSPECIFIED TYPE: ICD-10-CM

## 2021-04-22 RX ORDER — TRAZODONE HYDROCHLORIDE 150 MG/1
TABLET ORAL
Qty: 90 TABLET | Refills: 0 | Status: SHIPPED | OUTPATIENT
Start: 2021-04-22 | End: 2021-01-01 | Stop reason: SDUPTHER

## 2021-07-27 NOTE — TELEPHONE ENCOUNTER
Scheduled pt for 8/5/21. Pt asking if he can have a refill on Trazodone until appt? I stated I would send a message but most likely will not get refilled due to last OV being 9/16/20. Please advise.

## 2021-07-27 NOTE — TELEPHONE ENCOUNTER
----- Message from Chirag Nix sent at 7/26/2021  6:08 PM EDT -----  Subject: Appointment Request    Reason for Call: Urgent (Patient Request) Existing Condition Follow    QUESTIONS  Type of Appointment? Established Patient  Reason for appointment request? Other - Earliest appointment was June 19,   pt is trying to get prescriptions refilled   Additional Information for Provider? Pt called in trying to schedule a f/u   appointment so he can get prescriptions refilled. Pharmacy couldn't fill   them without an appointment. First available is 08/19/2021. Pt is asking   if a virtual appointment is available but it doesn't show up for the E[facility]. Can someone from the office call pt back and let pt know if an earlier   appointment is available.   ---------------------------------------------------------------------------  --------------  [address]  What is the best way for the office to contact you? OK to leave message on   voicemail  Preferred Call Back Phone Number? [phone]  ---------------------------------------------------------------------------  --------------  SCRIPT ANSWERS  Relationship to Patient? Self  (Is the patient requesting to be seen urgently for their symptoms?)? Yes  Is this follow up request related to routine Diabetes Management? No  Are you having any new concerns about your existing condition? No  Have you been diagnosed with, awaiting test results for, or told that you   are suspected of having COVID-19 (Coronavirus)? (If patient has tested   negative or was tested as a requirement for work, school, or travel and   not based on symptoms, answer no)? No  Do you currently have flu-like symptoms including fever or chills, cough,   shortness of breath, difficulty breathing, or new loss of taste or smell? No  Have you had close contact with someone with COVID-19 in the last 14 days? No  (Service Expert  click yes below to proceed with [facility] As Usual   Scheduling)?  Yes

## 2021-08-05 NOTE — PROGRESS NOTES
Subjective:      Patient ID: Ines Navarrete is a 78 y.o. male. HPI-patient is here for evaluation of multiple medical problem. He has history of COPD, hyperlipidemia, GERD. Compliant with diet medication  History of smoking,  No new complaints. Requesting refill of his medication. Review of Systems   Constitutional: Negative for activity change, appetite change, chills and diaphoresis. HENT: Negative for congestion, dental problem, ear discharge, facial swelling and hearing loss. Respiratory: Positive for shortness of breath (With exertion. ). Negative for apnea, cough, chest tightness and wheezing. Cardiovascular: Negative for chest pain and leg swelling. Gastrointestinal: Negative for abdominal distention, abdominal pain, constipation and diarrhea. Genitourinary: Negative for difficulty urinating, dysuria, enuresis, flank pain and frequency. Musculoskeletal: Negative for arthralgias, back pain, gait problem and joint swelling. Skin: Negative for color change, pallor and rash. Neurological: Negative for dizziness, seizures, facial asymmetry, light-headedness, numbness and headaches. Psychiatric/Behavioral: Negative for agitation, behavioral problems, confusion, decreased concentration and dysphoric mood. Objective:   Physical Exam  Vitals and nursing note reviewed. Constitutional:       General: He is not in acute distress. Appearance: He is well-developed. He is obese. He is not diaphoretic. HENT:      Head: Normocephalic and atraumatic. Mouth/Throat:      Pharynx: No oropharyngeal exudate. Eyes:      General: No scleral icterus. Right eye: No discharge. Left eye: No discharge. Conjunctiva/sclera: Conjunctivae normal.      Pupils: Pupils are equal, round, and reactive to light. Neck:      Thyroid: No thyromegaly. Vascular: No JVD. Trachea: No tracheal deviation. Cardiovascular:      Rate and Rhythm: Normal rate.       Heart sounds: Normal heart sounds. No murmur heard. No gallop. Pulmonary:      Effort: Pulmonary effort is normal. No respiratory distress. Breath sounds: Normal breath sounds. No stridor. No wheezing or rales. Abdominal:      General: Bowel sounds are normal. There is no distension. Palpations: Abdomen is soft. Tenderness: There is no abdominal tenderness. There is no guarding or rebound. Musculoskeletal:         General: No tenderness. Normal range of motion. Cervical back: Normal range of motion and neck supple. Skin:     General: Skin is warm and dry. Findings: No erythema or rash. Neurological:      Mental Status: He is alert and oriented to person, place, and time. Assessment / Plan:   1. Paroxysmal SVT (supraventricular tachycardia) (HCC)  Stable     2. 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 3. Liver disease, chronic, due to alcohol (HCC)  Stable     4. Dermatitis  - clobetasol (TEMOVATE) 0.05 % ointment; Apply topically 2 times daily. Dispense: 100 g; Refill: 3    5. Hyperlipidemia, unspecified hyperlipidemia type  - Comprehensive Metabolic Panel; Future  - Lipid Panel; Future    6. Smoker    - US SCREENING FOR AAA; Future      · Return in about 1 year (around 8/5/2022). · Reviewed prior labs and health maintenance. · Discussed use, benefit, and side effects of prescribed medications. Barriers to medication compliance addressed. All patient questions answered. Pt voiced understanding. MD CH[facility]IE [facility]inN[facility]  8/8/2021, 8:46 PM    Please note that this chart was generated using voice recognition Dragon dictation software. Although every effort was made to ensure the accuracy of this automated transcription, some errors in transcription may have occurred.         Visit Information    Have you changed or started any medications since your last visit including any over-the-counter medicines, vitamins, or herbal medicines? no   Are you having any side effects from any of your medications? -  no  Have you stopped taking any of your medications? Is so, why? -  no    Have you seen any other physician or provider since your last visit? Yes - Records Obtained  Have you had any other diagnostic tests since your last visit? Yes - Records Obtained  Have you been seen in the emergency room and/or had an admission to a hospital since we last saw you? No  Have you had your routine dental cleaning in the past 6 months? yes -     Have you activated your [employer] account? If not, what are your barriers?  Yes     Patient Care Team:  Zaynab Clement MD as PCP - General (Internal Medicine)  Zaynab Clemnet MD as PCP - [facility]    Medical History Review  Past Medical, Family, and Social History reviewed and does contribute to the patient presenting condition    Health Maintenance   Topic Date Due    AAA screen  Never done    COVID-19 Vaccine (1) Never done    Low dose CT lung screening  Never done    Shingles Vaccine (2 of 2) 11/28/2020    Flu vaccine (1) 09/01/2021    Annual Wellness Visit (AWV)  09/17/2021    A1C test (Diabetic or Prediabetic)  09/18/2021    Lipid screen  09/18/2021    Pneumococcal 65+ years Vaccine (1 of 1 - PPSV23) 03/27/2022    Colon cancer screen colonoscopy  08/29/2028    DTaP/Tdap/Td vaccine (2 - Td or Tdap) 09/16/2030    Hepatitis C screen  Completed    Hepatitis A vaccine  Aged Out    Hepatitis B vaccine  Aged Out    Hib vaccine  Aged Out    Meningococcal (ACWY) vaccine  Aged Out

## 2021-12-28 NOTE — TELEPHONE ENCOUNTER
From: Jenny Mcdaniel  To: Dr. Cassy Hyman: 12/27/2021 1:35 PM EST  Subject: Prescriptions that came from my allergist.     I am having problems refilling three prescriptions that were prescribed by my allergist that I no longer see but now are supposed to be with Dr. Marizol Brennan. They are breo ellipta, which was taken care of last month, montelukast 10mg tablet, and azelastine HCL 0.1%, 137mcg per spray. Note that the azelastine is supposed to be HCL 0.1% not 0.15%. That dosage was changed by my allergist but never changed at [address] [facility] office. I think the problem stems from the fact that my linked account with [address] [place] [address] be accessed. Thank You for any help with this. I need to have the montelukast scrip refilled please.

## 2022-01-01 ENCOUNTER — APPOINTMENT (OUTPATIENT)
Dept: GENERAL RADIOLOGY | Age: 67
DRG: 471 | End: 2022-01-01
Payer: MEDICARE

## 2022-01-01 ENCOUNTER — APPOINTMENT (OUTPATIENT)
Dept: CT IMAGING | Age: 67
End: 2022-01-01
Payer: MEDICARE

## 2022-01-01 ENCOUNTER — ANESTHESIA (OUTPATIENT)
Dept: OPERATING ROOM | Age: 67
DRG: 471 | End: 2022-01-01
Payer: MEDICARE

## 2022-01-01 ENCOUNTER — APPOINTMENT (OUTPATIENT)
Dept: MRI IMAGING | Age: 67
DRG: 471 | End: 2022-01-01
Payer: MEDICARE

## 2022-01-01 ENCOUNTER — APPOINTMENT (OUTPATIENT)
Dept: CT IMAGING | Age: 67
DRG: 471 | End: 2022-01-01
Payer: MEDICARE

## 2022-01-01 ENCOUNTER — HOSPITAL ENCOUNTER (EMERGENCY)
Age: 67
Discharge: ANOTHER HOSPITAL | End: 2022-04-19
Attending: EMERGENCY MEDICINE
Payer: MEDICARE

## 2022-01-01 ENCOUNTER — ANESTHESIA EVENT (OUTPATIENT)
Dept: OPERATING ROOM | Age: 67
DRG: 471 | End: 2022-01-01
Payer: MEDICARE

## 2022-01-01 ENCOUNTER — PATIENT MESSAGE (OUTPATIENT)
Dept: INTERNAL MEDICINE CLINIC | Age: 67
End: 2022-01-01

## 2022-01-01 ENCOUNTER — HOSPITAL ENCOUNTER (INPATIENT)
Age: 67
LOS: 11 days | DRG: 471 | End: 2022-04-30
Attending: EMERGENCY MEDICINE | Admitting: SURGERY
Payer: MEDICARE

## 2022-01-01 VITALS
RESPIRATION RATE: 20 BRPM | HEIGHT: 74 IN | TEMPERATURE: 101 F | HEART RATE: 85 BPM | DIASTOLIC BLOOD PRESSURE: 73 MMHG | OXYGEN SATURATION: 99 % | WEIGHT: 260.8 LBS | SYSTOLIC BLOOD PRESSURE: 139 MMHG | BODY MASS INDEX: 33.47 KG/M2

## 2022-01-01 VITALS
WEIGHT: 200 LBS | HEIGHT: 74 IN | TEMPERATURE: 98.1 F | SYSTOLIC BLOOD PRESSURE: 118 MMHG | HEART RATE: 102 BPM | OXYGEN SATURATION: 97 % | RESPIRATION RATE: 18 BRPM | BODY MASS INDEX: 25.67 KG/M2 | DIASTOLIC BLOOD PRESSURE: 91 MMHG

## 2022-01-01 VITALS
DIASTOLIC BLOOD PRESSURE: 79 MMHG | RESPIRATION RATE: 16 BRPM | TEMPERATURE: 99.1 F | OXYGEN SATURATION: 98 % | SYSTOLIC BLOOD PRESSURE: 99 MMHG

## 2022-01-01 DIAGNOSIS — K21.00 GASTROESOPHAGEAL REFLUX DISEASE WITH ESOPHAGITIS: ICD-10-CM

## 2022-01-01 DIAGNOSIS — G47.00 INSOMNIA, UNSPECIFIED TYPE: ICD-10-CM

## 2022-01-01 DIAGNOSIS — E87.29 ALCOHOLIC KETOACIDOSIS: Primary | ICD-10-CM

## 2022-01-01 DIAGNOSIS — S12.601A CLOSED NONDISPLACED FRACTURE OF SEVENTH CERVICAL VERTEBRA, UNSPECIFIED FRACTURE MORPHOLOGY, INITIAL ENCOUNTER (HCC): ICD-10-CM

## 2022-01-01 DIAGNOSIS — J45.30 MILD PERSISTENT ASTHMA WITHOUT COMPLICATION: ICD-10-CM

## 2022-01-01 DIAGNOSIS — S12.501A CLOSED NONDISPLACED FRACTURE OF SIXTH CERVICAL VERTEBRA, UNSPECIFIED FRACTURE MORPHOLOGY, INITIAL ENCOUNTER (HCC): Primary | ICD-10-CM

## 2022-01-01 DIAGNOSIS — S12.501A CLOSED NONDISPLACED FRACTURE OF SIXTH CERVICAL VERTEBRA, UNSPECIFIED FRACTURE MORPHOLOGY, INITIAL ENCOUNTER (HCC): ICD-10-CM

## 2022-01-01 DIAGNOSIS — E87.29 ALCOHOLIC KETOACIDOSIS: ICD-10-CM

## 2022-01-01 DIAGNOSIS — E78.5 HYPERLIPIDEMIA, UNSPECIFIED HYPERLIPIDEMIA TYPE: ICD-10-CM

## 2022-01-01 LAB
-: ABNORMAL
ABO/RH: NORMAL
ABSOLUTE EOS #: 0 K/UL (ref 0–0.4)
ABSOLUTE EOS #: 0 K/UL (ref 0–0.4)
ABSOLUTE EOS #: 0.06 K/UL (ref 0–0.44)
ABSOLUTE IMMATURE GRANULOCYTE: 0.06 K/UL (ref 0–0.3)
ABSOLUTE IMMATURE GRANULOCYTE: 0.67 K/UL (ref 0–0.3)
ABSOLUTE LYMPH #: 1.13 K/UL (ref 1–4.8)
ABSOLUTE LYMPH #: 1.31 K/UL (ref 1.1–3.7)
ABSOLUTE LYMPH #: 2.93 K/UL (ref 1–4.8)
ABSOLUTE MONO #: 0.46 K/UL (ref 0.1–1.2)
ABSOLUTE MONO #: 0.65 K/UL (ref 0.1–1.3)
ABSOLUTE MONO #: 0.67 K/UL (ref 0.1–0.8)
ALBUMIN SERPL-MCNC: 2.7 G/DL (ref 3.5–5.2)
ALBUMIN SERPL-MCNC: 2.8 G/DL (ref 3.5–5.2)
ALBUMIN SERPL-MCNC: 4.1 G/DL (ref 3.5–5.2)
ALBUMIN SERPL-MCNC: 4.2 G/DL (ref 3.5–5.2)
ALBUMIN/GLOBULIN RATIO: 0.9 (ref 1–2.5)
ALBUMIN/GLOBULIN RATIO: 1 (ref 1–2.5)
ALBUMIN/GLOBULIN RATIO: 1.6 (ref 1–2.5)
ALLEN TEST: ABNORMAL
ALLEN TEST: POSITIVE
ALP BLD-CCNC: 72 U/L (ref 40–129)
ALP BLD-CCNC: 77 U/L (ref 40–129)
ALP BLD-CCNC: 79 U/L (ref 40–129)
ALP BLD-CCNC: 83 U/L (ref 40–129)
ALT SERPL-CCNC: 24 U/L (ref 5–41)
ALT SERPL-CCNC: 27 U/L (ref 5–41)
ALT SERPL-CCNC: 67 U/L (ref 5–41)
ALT SERPL-CCNC: 77 U/L (ref 5–41)
ANION GAP SERPL CALCULATED.3IONS-SCNC: 10 MMOL/L (ref 9–17)
ANION GAP SERPL CALCULATED.3IONS-SCNC: 10 MMOL/L (ref 9–17)
ANION GAP SERPL CALCULATED.3IONS-SCNC: 11 MMOL/L (ref 9–17)
ANION GAP SERPL CALCULATED.3IONS-SCNC: 13 MMOL/L (ref 9–17)
ANION GAP SERPL CALCULATED.3IONS-SCNC: 14 MMOL/L (ref 9–17)
ANION GAP SERPL CALCULATED.3IONS-SCNC: 15 MMOL/L (ref 9–17)
ANION GAP SERPL CALCULATED.3IONS-SCNC: 15 MMOL/L (ref 9–17)
ANION GAP SERPL CALCULATED.3IONS-SCNC: 20 MMOL/L (ref 9–17)
ANION GAP SERPL CALCULATED.3IONS-SCNC: 21 MMOL/L (ref 9–17)
ANION GAP SERPL CALCULATED.3IONS-SCNC: 22 MMOL/L (ref 9–17)
ANION GAP SERPL CALCULATED.3IONS-SCNC: 24 MMOL/L (ref 9–17)
ANION GAP SERPL CALCULATED.3IONS-SCNC: 6 MMOL/L (ref 9–17)
ANION GAP SERPL CALCULATED.3IONS-SCNC: 6 MMOL/L (ref 9–17)
ANION GAP SERPL CALCULATED.3IONS-SCNC: 8 MMOL/L (ref 9–17)
ANION GAP SERPL CALCULATED.3IONS-SCNC: 9 MMOL/L (ref 9–17)
ANION GAP SERPL CALCULATED.3IONS-SCNC: 9 MMOL/L (ref 9–17)
ANTIBODY SCREEN: NEGATIVE
ARM BAND NUMBER: NORMAL
AST SERPL-CCNC: 108 U/L
AST SERPL-CCNC: 19 U/L
AST SERPL-CCNC: 31 U/L
AST SERPL-CCNC: 87 U/L
BACTERIA: ABNORMAL
BASOPHILS # BLD: 0 % (ref 0–2)
BASOPHILS # BLD: 0 % (ref 0–2)
BASOPHILS # BLD: 1 % (ref 0–2)
BASOPHILS ABSOLUTE: 0 K/UL (ref 0–0.2)
BASOPHILS ABSOLUTE: 0 K/UL (ref 0–0.2)
BASOPHILS ABSOLUTE: 0.08 K/UL (ref 0–0.2)
BETA-HYDROXYBUTYRATE: 4.55 MMOL/L (ref 0.02–0.27)
BILIRUB SERPL-MCNC: 0.51 MG/DL (ref 0.3–1.2)
BILIRUB SERPL-MCNC: 0.51 MG/DL (ref 0.3–1.2)
BILIRUB SERPL-MCNC: 0.84 MG/DL (ref 0.3–1.2)
BILIRUB SERPL-MCNC: 0.88 MG/DL (ref 0.3–1.2)
BILIRUBIN DIRECT: 0.17 MG/DL
BILIRUBIN DIRECT: 0.2 MG/DL
BILIRUBIN URINE: NEGATIVE
BUN BLDV-MCNC: 10 MG/DL (ref 8–23)
BUN BLDV-MCNC: 11 MG/DL (ref 8–23)
BUN BLDV-MCNC: 11 MG/DL (ref 8–23)
BUN BLDV-MCNC: 12 MG/DL (ref 8–23)
BUN BLDV-MCNC: 15 MG/DL (ref 8–23)
BUN BLDV-MCNC: 16 MG/DL (ref 8–23)
BUN BLDV-MCNC: 18 MG/DL (ref 8–23)
BUN BLDV-MCNC: 18 MG/DL (ref 8–23)
BUN BLDV-MCNC: 20 MG/DL (ref 8–23)
BUN BLDV-MCNC: 20 MG/DL (ref 8–23)
BUN BLDV-MCNC: 5 MG/DL (ref 8–23)
BUN BLDV-MCNC: 5 MG/DL (ref 8–23)
BUN BLDV-MCNC: 6 MG/DL (ref 8–23)
BUN BLDV-MCNC: 6 MG/DL (ref 8–23)
BUN BLDV-MCNC: 7 MG/DL (ref 8–23)
BUN BLDV-MCNC: 9 MG/DL (ref 8–23)
CALCIUM IONIZED: 1.05 MMOL/L (ref 1.13–1.33)
CALCIUM IONIZED: 1.24 MMOL/L (ref 1.13–1.33)
CALCIUM SERPL-MCNC: 7.2 MG/DL (ref 8.6–10.4)
CALCIUM SERPL-MCNC: 7.3 MG/DL (ref 8.6–10.4)
CALCIUM SERPL-MCNC: 7.3 MG/DL (ref 8.6–10.4)
CALCIUM SERPL-MCNC: 7.5 MG/DL (ref 8.6–10.4)
CALCIUM SERPL-MCNC: 7.5 MG/DL (ref 8.6–10.4)
CALCIUM SERPL-MCNC: 7.8 MG/DL (ref 8.6–10.4)
CALCIUM SERPL-MCNC: 8 MG/DL (ref 8.6–10.4)
CALCIUM SERPL-MCNC: 8.3 MG/DL (ref 8.6–10.4)
CALCIUM SERPL-MCNC: 8.5 MG/DL (ref 8.6–10.4)
CALCIUM SERPL-MCNC: 8.5 MG/DL (ref 8.6–10.4)
CALCIUM SERPL-MCNC: 8.6 MG/DL (ref 8.6–10.4)
CALCIUM SERPL-MCNC: 8.7 MG/DL (ref 8.6–10.4)
CALCIUM SERPL-MCNC: 8.7 MG/DL (ref 8.6–10.4)
CALCIUM SERPL-MCNC: 9.3 MG/DL (ref 8.6–10.4)
CHLORIDE BLD-SCNC: 100 MMOL/L (ref 98–107)
CHLORIDE BLD-SCNC: 101 MMOL/L (ref 98–107)
CHLORIDE BLD-SCNC: 105 MMOL/L (ref 98–107)
CHLORIDE BLD-SCNC: 105 MMOL/L (ref 98–107)
CHLORIDE BLD-SCNC: 106 MMOL/L (ref 98–107)
CHLORIDE BLD-SCNC: 95 MMOL/L (ref 98–107)
CHLORIDE BLD-SCNC: 95 MMOL/L (ref 98–107)
CHLORIDE BLD-SCNC: 99 MMOL/L (ref 98–107)
CO2: 14 MMOL/L (ref 20–31)
CO2: 17 MMOL/L (ref 20–31)
CO2: 22 MMOL/L (ref 20–31)
CO2: 22 MMOL/L (ref 20–31)
CO2: 23 MMOL/L (ref 20–31)
CO2: 25 MMOL/L (ref 20–31)
CO2: 28 MMOL/L (ref 20–31)
CO2: 29 MMOL/L (ref 20–31)
CO2: 30 MMOL/L (ref 20–31)
CO2: 30 MMOL/L (ref 20–31)
CO2: 31 MMOL/L (ref 20–31)
CO2: 31 MMOL/L (ref 20–31)
CO2: 32 MMOL/L (ref 20–31)
CO2: 32 MMOL/L (ref 20–31)
COLOR: YELLOW
CREAT SERPL-MCNC: 0.29 MG/DL (ref 0.7–1.2)
CREAT SERPL-MCNC: 0.32 MG/DL (ref 0.7–1.2)
CREAT SERPL-MCNC: 0.35 MG/DL (ref 0.7–1.2)
CREAT SERPL-MCNC: 0.36 MG/DL (ref 0.7–1.2)
CREAT SERPL-MCNC: 0.36 MG/DL (ref 0.7–1.2)
CREAT SERPL-MCNC: 0.42 MG/DL (ref 0.7–1.2)
CREAT SERPL-MCNC: 0.46 MG/DL (ref 0.7–1.2)
CREAT SERPL-MCNC: 0.46 MG/DL (ref 0.7–1.2)
CREAT SERPL-MCNC: 0.47 MG/DL (ref 0.7–1.2)
CREAT SERPL-MCNC: 0.47 MG/DL (ref 0.7–1.2)
CREAT SERPL-MCNC: 0.49 MG/DL (ref 0.7–1.2)
CREAT SERPL-MCNC: 0.51 MG/DL (ref 0.7–1.2)
CREAT SERPL-MCNC: 0.54 MG/DL (ref 0.7–1.2)
CREAT SERPL-MCNC: 0.58 MG/DL (ref 0.7–1.2)
CREAT SERPL-MCNC: 0.59 MG/DL (ref 0.7–1.2)
CREAT SERPL-MCNC: 0.65 MG/DL (ref 0.7–1.2)
EKG ATRIAL RATE: 101 BPM
EKG ATRIAL RATE: 108 BPM
EKG ATRIAL RATE: 129 BPM
EKG ATRIAL RATE: 131 BPM
EKG ATRIAL RATE: 142 BPM
EKG ATRIAL RATE: 156 BPM
EKG ATRIAL RATE: 163 BPM
EKG ATRIAL RATE: 170 BPM
EKG ATRIAL RATE: 277 BPM
EKG ATRIAL RATE: 87 BPM
EKG P AXIS: -137 DEGREES
EKG P AXIS: 36 DEGREES
EKG P AXIS: 44 DEGREES
EKG P AXIS: 46 DEGREES
EKG P AXIS: 77 DEGREES
EKG P-R INTERVAL: 134 MS
EKG P-R INTERVAL: 136 MS
EKG P-R INTERVAL: 170 MS
EKG P-R INTERVAL: 176 MS
EKG P-R INTERVAL: 176 MS
EKG Q-T INTERVAL: 308 MS
EKG Q-T INTERVAL: 308 MS
EKG Q-T INTERVAL: 316 MS
EKG Q-T INTERVAL: 320 MS
EKG Q-T INTERVAL: 326 MS
EKG Q-T INTERVAL: 332 MS
EKG Q-T INTERVAL: 350 MS
EKG Q-T INTERVAL: 360 MS
EKG Q-T INTERVAL: 362 MS
EKG Q-T INTERVAL: 394 MS
EKG QRS DURATION: 108 MS
EKG QRS DURATION: 82 MS
EKG QRS DURATION: 86 MS
EKG QRS DURATION: 86 MS
EKG QRS DURATION: 88 MS
EKG QRS DURATION: 92 MS
EKG QRS DURATION: 92 MS
EKG QRS DURATION: 98 MS
EKG QTC CALCULATION (BAZETT): 444 MS
EKG QTC CALCULATION (BAZETT): 466 MS
EKG QTC CALCULATION (BAZETT): 466 MS
EKG QTC CALCULATION (BAZETT): 467 MS
EKG QTC CALCULATION (BAZETT): 473 MS
EKG QTC CALCULATION (BAZETT): 474 MS
EKG QTC CALCULATION (BAZETT): 491 MS
EKG QTC CALCULATION (BAZETT): 511 MS
EKG QTC CALCULATION (BAZETT): 525 MS
EKG QTC CALCULATION (BAZETT): 536 MS
EKG R AXIS: 105 DEGREES
EKG R AXIS: 25 DEGREES
EKG R AXIS: 29 DEGREES
EKG R AXIS: 36 DEGREES
EKG R AXIS: 44 DEGREES
EKG R AXIS: 52 DEGREES
EKG R AXIS: 61 DEGREES
EKG R AXIS: 64 DEGREES
EKG R AXIS: 69 DEGREES
EKG R AXIS: 81 DEGREES
EKG T AXIS: 108 DEGREES
EKG T AXIS: 29 DEGREES
EKG T AXIS: 30 DEGREES
EKG T AXIS: 33 DEGREES
EKG T AXIS: 41 DEGREES
EKG T AXIS: 48 DEGREES
EKG T AXIS: 52 DEGREES
EKG T AXIS: 53 DEGREES
EKG T AXIS: 54 DEGREES
EKG T AXIS: 61 DEGREES
EKG VENTRICULAR RATE: 101 BPM
EKG VENTRICULAR RATE: 108 BPM
EKG VENTRICULAR RATE: 128 BPM
EKG VENTRICULAR RATE: 128 BPM
EKG VENTRICULAR RATE: 131 BPM
EKG VENTRICULAR RATE: 132 BPM
EKG VENTRICULAR RATE: 142 BPM
EKG VENTRICULAR RATE: 153 BPM
EKG VENTRICULAR RATE: 156 BPM
EKG VENTRICULAR RATE: 87 BPM
EOSINOPHILS RELATIVE PERCENT: 0 % (ref 0–4)
EOSINOPHILS RELATIVE PERCENT: 0 % (ref 1–4)
EOSINOPHILS RELATIVE PERCENT: 1 % (ref 1–4)
EPITHELIAL CELLS UA: ABNORMAL /HPF (ref 0–5)
ETHANOL PERCENT: 0.24 %
ETHANOL: 241 MG/DL
EXPIRATION DATE: NORMAL
FIO2: 100
FIO2: 40
FIO2: 45
FIO2: 60
FIO2: 70
FIO2: 70
FIO2: 80
FIO2: 80
GFR AFRICAN AMERICAN: >60 ML/MIN
GFR NON-AFRICAN AMERICAN: >60 ML/MIN
GFR SERPL CREATININE-BSD FRML MDRD: ABNORMAL ML/MIN/{1.73_M2}
GLUCOSE BLD-MCNC: 116 MG/DL (ref 70–99)
GLUCOSE BLD-MCNC: 134 MG/DL (ref 70–99)
GLUCOSE BLD-MCNC: 135 MG/DL (ref 70–99)
GLUCOSE BLD-MCNC: 135 MG/DL (ref 74–100)
GLUCOSE BLD-MCNC: 136 MG/DL (ref 70–99)
GLUCOSE BLD-MCNC: 140 MG/DL (ref 70–99)
GLUCOSE BLD-MCNC: 144 MG/DL (ref 70–99)
GLUCOSE BLD-MCNC: 146 MG/DL (ref 70–99)
GLUCOSE BLD-MCNC: 146 MG/DL (ref 75–110)
GLUCOSE BLD-MCNC: 150 MG/DL (ref 74–100)
GLUCOSE BLD-MCNC: 150 MG/DL (ref 75–110)
GLUCOSE BLD-MCNC: 151 MG/DL (ref 70–99)
GLUCOSE BLD-MCNC: 151 MG/DL (ref 75–110)
GLUCOSE BLD-MCNC: 153 MG/DL (ref 74–100)
GLUCOSE BLD-MCNC: 153 MG/DL (ref 75–110)
GLUCOSE BLD-MCNC: 154 MG/DL (ref 74–100)
GLUCOSE BLD-MCNC: 155 MG/DL (ref 70–99)
GLUCOSE BLD-MCNC: 157 MG/DL (ref 70–99)
GLUCOSE BLD-MCNC: 162 MG/DL (ref 70–99)
GLUCOSE BLD-MCNC: 163 MG/DL (ref 74–100)
GLUCOSE BLD-MCNC: 176 MG/DL (ref 74–100)
GLUCOSE BLD-MCNC: 180 MG/DL (ref 70–99)
GLUCOSE BLD-MCNC: 181 MG/DL (ref 75–110)
GLUCOSE BLD-MCNC: 182 MG/DL (ref 70–99)
GLUCOSE BLD-MCNC: 189 MG/DL (ref 74–100)
GLUCOSE BLD-MCNC: 191 MG/DL (ref 70–99)
GLUCOSE BLD-MCNC: 197 MG/DL (ref 70–99)
GLUCOSE BLD-MCNC: 214 MG/DL (ref 75–110)
GLUCOSE BLD-MCNC: 255 MG/DL (ref 74–100)
GLUCOSE BLD-MCNC: 262 MG/DL (ref 70–99)
GLUCOSE BLD-MCNC: 278 MG/DL (ref 74–100)
GLUCOSE BLD-MCNC: 285 MG/DL (ref 74–100)
GLUCOSE BLD-MCNC: 87 MG/DL (ref 74–100)
GLUCOSE URINE: ABNORMAL
HCT VFR BLD CALC: 27.6 % (ref 40.7–50.3)
HCT VFR BLD CALC: 28.9 % (ref 40.7–50.3)
HCT VFR BLD CALC: 29.4 % (ref 40.7–50.3)
HCT VFR BLD CALC: 30 % (ref 40.7–50.3)
HCT VFR BLD CALC: 30.9 % (ref 40.7–50.3)
HCT VFR BLD CALC: 31.4 % (ref 40.7–50.3)
HCT VFR BLD CALC: 31.9 % (ref 40.7–50.3)
HCT VFR BLD CALC: 32.1 % (ref 40.7–50.3)
HCT VFR BLD CALC: 32.1 % (ref 40.7–50.3)
HCT VFR BLD CALC: 32.8 % (ref 40.7–50.3)
HCT VFR BLD CALC: 34.2 % (ref 40.7–50.3)
HCT VFR BLD CALC: 34.2 % (ref 40.7–50.3)
HCT VFR BLD CALC: 35.7 % (ref 40.7–50.3)
HCT VFR BLD CALC: 35.9 % (ref 40.7–50.3)
HCT VFR BLD CALC: 36.2 % (ref 40.7–50.3)
HCT VFR BLD CALC: 37.4 % (ref 41–53)
HEMOGLOBIN: 10.1 G/DL (ref 13–17)
HEMOGLOBIN: 10.3 G/DL (ref 13–17)
HEMOGLOBIN: 10.5 G/DL (ref 13–17)
HEMOGLOBIN: 10.5 G/DL (ref 13–17)
HEMOGLOBIN: 11.2 G/DL (ref 13–17)
HEMOGLOBIN: 11.3 G/DL (ref 13–17)
HEMOGLOBIN: 11.5 G/DL (ref 13–17)
HEMOGLOBIN: 11.8 G/DL (ref 13–17)
HEMOGLOBIN: 12.6 G/DL (ref 13.5–17.5)
HEMOGLOBIN: 9.1 G/DL (ref 13–17)
HEMOGLOBIN: 9.1 G/DL (ref 13–17)
HEMOGLOBIN: 9.5 G/DL (ref 13–17)
HEMOGLOBIN: 9.7 G/DL (ref 13–17)
HEMOGLOBIN: 9.7 G/DL (ref 13–17)
HEMOGLOBIN: 9.8 G/DL (ref 13–17)
HEMOGLOBIN: 9.8 G/DL (ref 13–17)
IMMATURE GRANULOCYTES: 1 %
IMMATURE GRANULOCYTES: 5 %
INR BLD: 1
KETONES, URINE: ABNORMAL
LACTIC ACID, SEPSIS: 4.3 MMOL/L (ref 0.5–1.9)
LACTIC ACID, WHOLE BLOOD: 1.8 MMOL/L (ref 0.7–2.1)
LACTIC ACID, WHOLE BLOOD: 2 MMOL/L (ref 0.7–2.1)
LEUKOCYTE ESTERASE, URINE: ABNORMAL
LYMPHOCYTES # BLD: 14 % (ref 24–44)
LYMPHOCYTES # BLD: 22 % (ref 24–44)
LYMPHOCYTES # BLD: 23 % (ref 24–43)
MAGNESIUM: 1.5 MG/DL (ref 1.6–2.6)
MAGNESIUM: 1.7 MG/DL (ref 1.6–2.6)
MAGNESIUM: 1.8 MG/DL (ref 1.6–2.6)
MAGNESIUM: 1.9 MG/DL (ref 1.6–2.6)
MAGNESIUM: 2 MG/DL (ref 1.6–2.6)
MAGNESIUM: 2.1 MG/DL (ref 1.6–2.6)
MAGNESIUM: 2.2 MG/DL (ref 1.6–2.6)
MAGNESIUM: 2.7 MG/DL (ref 1.6–2.6)
MCH RBC QN AUTO: 29.9 PG (ref 25.2–33.5)
MCH RBC QN AUTO: 29.9 PG (ref 25.2–33.5)
MCH RBC QN AUTO: 30.1 PG (ref 25.2–33.5)
MCH RBC QN AUTO: 30.1 PG (ref 25.2–33.5)
MCH RBC QN AUTO: 30.2 PG (ref 25.2–33.5)
MCH RBC QN AUTO: 30.2 PG (ref 25.2–33.5)
MCH RBC QN AUTO: 30.4 PG (ref 25.2–33.5)
MCH RBC QN AUTO: 30.5 PG (ref 25.2–33.5)
MCH RBC QN AUTO: 30.5 PG (ref 25.2–33.5)
MCH RBC QN AUTO: 30.6 PG (ref 25.2–33.5)
MCH RBC QN AUTO: 30.7 PG (ref 26–34)
MCH RBC QN AUTO: 30.8 PG (ref 25.2–33.5)
MCH RBC QN AUTO: 30.8 PG (ref 25.2–33.5)
MCH RBC QN AUTO: 30.9 PG (ref 25.2–33.5)
MCH RBC QN AUTO: 31.3 PG (ref 25.2–33.5)
MCH RBC QN AUTO: 31.3 PG (ref 25.2–33.5)
MCHC RBC AUTO-ENTMCNC: 30.4 G/DL (ref 28.4–34.8)
MCHC RBC AUTO-ENTMCNC: 30.7 G/DL (ref 28.4–34.8)
MCHC RBC AUTO-ENTMCNC: 31.2 G/DL (ref 28.4–34.8)
MCHC RBC AUTO-ENTMCNC: 31.5 G/DL (ref 28.4–34.8)
MCHC RBC AUTO-ENTMCNC: 31.5 G/DL (ref 28.4–34.8)
MCHC RBC AUTO-ENTMCNC: 31.7 G/DL (ref 28.4–34.8)
MCHC RBC AUTO-ENTMCNC: 31.7 G/DL (ref 28.4–34.8)
MCHC RBC AUTO-ENTMCNC: 32 G/DL (ref 28.4–34.8)
MCHC RBC AUTO-ENTMCNC: 32 G/DL (ref 28.4–34.8)
MCHC RBC AUTO-ENTMCNC: 32.1 G/DL (ref 28.4–34.8)
MCHC RBC AUTO-ENTMCNC: 32.3 G/DL (ref 28.4–34.8)
MCHC RBC AUTO-ENTMCNC: 32.3 G/DL (ref 28.4–34.8)
MCHC RBC AUTO-ENTMCNC: 32.6 G/DL (ref 28.4–34.8)
MCHC RBC AUTO-ENTMCNC: 32.7 G/DL (ref 28.4–34.8)
MCHC RBC AUTO-ENTMCNC: 33 G/DL (ref 28.4–34.8)
MCHC RBC AUTO-ENTMCNC: 33.7 G/DL (ref 31–37)
MCV RBC AUTO: 100 FL (ref 82.6–102.9)
MCV RBC AUTO: 91.3 FL (ref 80–100)
MCV RBC AUTO: 93.5 FL (ref 82.6–102.9)
MCV RBC AUTO: 93.5 FL (ref 82.6–102.9)
MCV RBC AUTO: 94 FL (ref 82.6–102.9)
MCV RBC AUTO: 94 FL (ref 82.6–102.9)
MCV RBC AUTO: 94.4 FL (ref 82.6–102.9)
MCV RBC AUTO: 94.5 FL (ref 82.6–102.9)
MCV RBC AUTO: 94.8 FL (ref 82.6–102.9)
MCV RBC AUTO: 95.4 FL (ref 82.6–102.9)
MCV RBC AUTO: 95.6 FL (ref 82.6–102.9)
MCV RBC AUTO: 95.8 FL (ref 82.6–102.9)
MCV RBC AUTO: 96.1 FL (ref 82.6–102.9)
MCV RBC AUTO: 97.4 FL (ref 82.6–102.9)
MCV RBC AUTO: 99.1 FL (ref 82.6–102.9)
MCV RBC AUTO: 99.3 FL (ref 82.6–102.9)
MODE: ABNORMAL
MONOCYTES # BLD: 5 % (ref 1–7)
MONOCYTES # BLD: 8 % (ref 1–7)
MONOCYTES # BLD: 8 % (ref 3–12)
MORPHOLOGY: ABNORMAL
MRSA, DNA, NASAL: NEGATIVE
MUCUS: ABNORMAL
NEGATIVE BASE EXCESS, ART: 5 (ref 0–2)
NEGATIVE BASE EXCESS, ART: 7 (ref 0–2)
NITRITE, URINE: NEGATIVE
NRBC AUTOMATED: 0 PER 100 WBC
NRBC AUTOMATED: 0.2 PER 100 WBC
NRBC AUTOMATED: 0.3 PER 100 WBC
NRBC AUTOMATED: 0.4 PER 100 WBC
O2 DEVICE/FLOW/%: ABNORMAL
PARTIAL THROMBOPLASTIN TIME: 24.5 SEC (ref 20.5–30.5)
PARTIAL THROMBOPLASTIN TIME: 24.6 SEC (ref 20.5–30.5)
PARTIAL THROMBOPLASTIN TIME: 25.5 SEC (ref 20.5–30.5)
PARTIAL THROMBOPLASTIN TIME: 26.2 SEC (ref 20.5–30.5)
PARTIAL THROMBOPLASTIN TIME: 32.1 SEC (ref 20.5–30.5)
PARTIAL THROMBOPLASTIN TIME: 39.8 SEC (ref 20.5–30.5)
PARTIAL THROMBOPLASTIN TIME: 43.1 SEC (ref 20.5–30.5)
PARTIAL THROMBOPLASTIN TIME: 54.8 SEC (ref 20.5–30.5)
PDW BLD-RTO: 21.2 % (ref 11.8–14.4)
PDW BLD-RTO: 21.6 % (ref 11.8–14.4)
PDW BLD-RTO: 21.7 % (ref 11.8–14.4)
PDW BLD-RTO: 21.8 % (ref 11.8–14.4)
PDW BLD-RTO: 21.9 % (ref 11.8–14.4)
PDW BLD-RTO: 21.9 % (ref 11.8–14.4)
PDW BLD-RTO: 22.1 % (ref 11.8–14.4)
PDW BLD-RTO: 22.2 % (ref 11.8–14.4)
PDW BLD-RTO: 22.3 % (ref 11.8–14.4)
PDW BLD-RTO: 22.3 % (ref 11.8–14.4)
PDW BLD-RTO: 22.4 % (ref 11.8–14.4)
PDW BLD-RTO: 22.4 % (ref 11.8–14.4)
PDW BLD-RTO: 22.9 % (ref 11.8–14.4)
PDW BLD-RTO: 24.9 % (ref 11.5–14.9)
PH UA: 6 (ref 5–8)
PHOSPHORUS: 1.5 MG/DL (ref 2.5–4.5)
PHOSPHORUS: 1.8 MG/DL (ref 2.5–4.5)
PHOSPHORUS: 1.9 MG/DL (ref 2.5–4.5)
PHOSPHORUS: 2 MG/DL (ref 2.5–4.5)
PHOSPHORUS: 2.3 MG/DL (ref 2.5–4.5)
PHOSPHORUS: 2.5 MG/DL (ref 2.5–4.5)
PHOSPHORUS: 2.8 MG/DL (ref 2.5–4.5)
PHOSPHORUS: 2.9 MG/DL (ref 2.5–4.5)
PHOSPHORUS: 2.9 MG/DL (ref 2.5–4.5)
PHOSPHORUS: 3.1 MG/DL (ref 2.5–4.5)
PHOSPHORUS: 3.4 MG/DL (ref 2.5–4.5)
PHOSPHORUS: 4 MG/DL (ref 2.5–4.5)
PHOSPHORUS: 7.4 MG/DL (ref 2.5–4.5)
PLATELET # BLD: 132 K/UL (ref 138–453)
PLATELET # BLD: 134 K/UL (ref 138–453)
PLATELET # BLD: 140 K/UL (ref 138–453)
PLATELET # BLD: 159 K/UL (ref 138–453)
PLATELET # BLD: 164 K/UL (ref 138–453)
PLATELET # BLD: 196 K/UL (ref 138–453)
PLATELET # BLD: 208 K/UL (ref 138–453)
PLATELET # BLD: 239 K/UL (ref 150–450)
PLATELET # BLD: 246 K/UL (ref 138–453)
PLATELET # BLD: 276 K/UL (ref 138–453)
PLATELET # BLD: 331 K/UL (ref 138–453)
PLATELET # BLD: 335 K/UL (ref 138–453)
PLATELET # BLD: 391 K/UL (ref 138–453)
PLATELET # BLD: 412 K/UL (ref 138–453)
PLATELET # BLD: 431 K/UL (ref 138–453)
PLATELET # BLD: ABNORMAL K/UL (ref 138–453)
PLATELET, FLUORESCENCE: 259 K/UL (ref 138–453)
PLATELET, IMMATURE FRACTION: 5.1 % (ref 1.1–10.3)
PMV BLD AUTO: 10 FL (ref 8.1–13.5)
PMV BLD AUTO: 7.1 FL (ref 6–12)
PMV BLD AUTO: 9.5 FL (ref 8.1–13.5)
PMV BLD AUTO: 9.5 FL (ref 8.1–13.5)
PMV BLD AUTO: 9.6 FL (ref 8.1–13.5)
PMV BLD AUTO: 9.7 FL (ref 8.1–13.5)
PMV BLD AUTO: 9.8 FL (ref 8.1–13.5)
PMV BLD AUTO: 9.9 FL (ref 8.1–13.5)
PMV BLD AUTO: 9.9 FL (ref 8.1–13.5)
POC HCO3: 16.1 MMOL/L (ref 21–28)
POC HCO3: 20.5 MMOL/L (ref 21–28)
POC HCO3: 26.1 MMOL/L (ref 21–28)
POC HCO3: 26.7 MMOL/L (ref 21–28)
POC HCO3: 27.2 MMOL/L (ref 21–28)
POC HCO3: 27.5 MMOL/L (ref 21–28)
POC HCO3: 32.1 MMOL/L (ref 21–28)
POC HCO3: 32.6 MMOL/L (ref 21–28)
POC HCO3: 32.8 MMOL/L (ref 21–28)
POC HCO3: 32.9 MMOL/L (ref 21–28)
POC HCO3: 33.3 MMOL/L (ref 21–28)
POC HCO3: 33.4 MMOL/L (ref 21–28)
POC HCO3: 34.2 MMOL/L (ref 21–28)
POC LACTIC ACID: 0.74 MMOL/L (ref 0.56–1.39)
POC LACTIC ACID: 0.8 MMOL/L (ref 0.56–1.39)
POC LACTIC ACID: 0.85 MMOL/L (ref 0.56–1.39)
POC LACTIC ACID: 0.87 MMOL/L (ref 0.56–1.39)
POC LACTIC ACID: 0.98 MMOL/L (ref 0.56–1.39)
POC LACTIC ACID: 1.01 MMOL/L (ref 0.56–1.39)
POC LACTIC ACID: 1.54 MMOL/L (ref 0.56–1.39)
POC LACTIC ACID: 1.6 MMOL/L (ref 0.56–1.39)
POC LACTIC ACID: 2.23 MMOL/L (ref 0.56–1.39)
POC LACTIC ACID: NORMAL MMOL/L (ref 0.56–1.39)
POC O2 SATURATION: 100 % (ref 94–98)
POC O2 SATURATION: 96 % (ref 94–98)
POC O2 SATURATION: 96 % (ref 94–98)
POC O2 SATURATION: 97 % (ref 94–98)
POC O2 SATURATION: 98 % (ref 94–98)
POC O2 SATURATION: 99 % (ref 94–98)
POC PCO2: 22.5 MM HG (ref 35–48)
POC PCO2: 38.5 MM HG (ref 35–48)
POC PCO2: 38.8 MM HG (ref 35–48)
POC PCO2: 39.9 MM HG (ref 35–48)
POC PCO2: 41 MM HG (ref 35–48)
POC PCO2: 41.5 MM HG (ref 35–48)
POC PCO2: 45.7 MM HG (ref 35–48)
POC PCO2: 46.3 MM HG (ref 35–48)
POC PCO2: 47.9 MM HG (ref 35–48)
POC PCO2: 49.4 MM HG (ref 35–48)
POC PCO2: 49.8 MM HG (ref 35–48)
POC PCO2: 50.9 MM HG (ref 35–48)
POC PCO2: 55.9 MM HG (ref 35–48)
POC PH: 7.33 (ref 7.35–7.45)
POC PH: 7.39 (ref 7.35–7.45)
POC PH: 7.42 (ref 7.35–7.45)
POC PH: 7.43 (ref 7.35–7.45)
POC PH: 7.44 (ref 7.35–7.45)
POC PH: 7.45 (ref 7.35–7.45)
POC PH: 7.45 (ref 7.35–7.45)
POC PH: 7.46 (ref 7.35–7.45)
POC PH: 7.46 (ref 7.35–7.45)
POC PO2: 100 MM HG (ref 83–108)
POC PO2: 106.1 MM HG (ref 83–108)
POC PO2: 108.3 MM HG (ref 83–108)
POC PO2: 109.1 MM HG (ref 83–108)
POC PO2: 119.7 MM HG (ref 83–108)
POC PO2: 131.5 MM HG (ref 83–108)
POC PO2: 138.9 MM HG (ref 83–108)
POC PO2: 417.2 MM HG (ref 83–108)
POC PO2: 82.1 MM HG (ref 83–108)
POC PO2: 82.3 MM HG (ref 83–108)
POC PO2: 92.3 MM HG (ref 83–108)
POC PO2: 93.5 MM HG (ref 83–108)
POC PO2: 97.8 MM HG (ref 83–108)
POSITIVE BASE EXCESS, ART: 2 (ref 0–3)
POSITIVE BASE EXCESS, ART: 2 (ref 0–3)
POSITIVE BASE EXCESS, ART: 3 (ref 0–3)
POSITIVE BASE EXCESS, ART: 3 (ref 0–3)
POSITIVE BASE EXCESS, ART: 7 (ref 0–3)
POSITIVE BASE EXCESS, ART: 8 (ref 0–3)
POTASSIUM SERPL-SCNC: 3.1 MMOL/L (ref 3.7–5.3)
POTASSIUM SERPL-SCNC: 3.1 MMOL/L (ref 3.7–5.3)
POTASSIUM SERPL-SCNC: 3.2 MMOL/L (ref 3.7–5.3)
POTASSIUM SERPL-SCNC: 3.3 MMOL/L (ref 3.7–5.3)
POTASSIUM SERPL-SCNC: 3.4 MMOL/L (ref 3.7–5.3)
POTASSIUM SERPL-SCNC: 3.4 MMOL/L (ref 3.7–5.3)
POTASSIUM SERPL-SCNC: 3.8 MMOL/L (ref 3.7–5.3)
POTASSIUM SERPL-SCNC: 4 MMOL/L (ref 3.7–5.3)
POTASSIUM SERPL-SCNC: 4 MMOL/L (ref 3.7–5.3)
POTASSIUM SERPL-SCNC: 4.1 MMOL/L (ref 3.7–5.3)
POTASSIUM SERPL-SCNC: 4.1 MMOL/L (ref 3.7–5.3)
POTASSIUM SERPL-SCNC: 4.2 MMOL/L (ref 3.7–5.3)
PRO-BNP: 726 PG/ML
PRO-BNP: 85 PG/ML
PROTEIN UA: ABNORMAL
PROTHROMBIN TIME: 10.7 SEC (ref 9.1–12.3)
RBC # BLD: 2.91 M/UL (ref 4.21–5.77)
RBC # BLD: 2.91 M/UL (ref 4.21–5.77)
RBC # BLD: 3.11 M/UL (ref 4.21–5.77)
RBC # BLD: 3.17 M/UL (ref 4.21–5.77)
RBC # BLD: 3.19 M/UL (ref 4.21–5.77)
RBC # BLD: 3.21 M/UL (ref 4.21–5.77)
RBC # BLD: 3.24 M/UL (ref 4.21–5.77)
RBC # BLD: 3.34 M/UL (ref 4.21–5.77)
RBC # BLD: 3.35 M/UL (ref 4.21–5.77)
RBC # BLD: 3.43 M/UL (ref 4.21–5.77)
RBC # BLD: 3.51 M/UL (ref 4.21–5.77)
RBC # BLD: 3.64 M/UL (ref 4.21–5.77)
RBC # BLD: 3.78 M/UL (ref 4.21–5.77)
RBC # BLD: 3.82 M/UL (ref 4.21–5.77)
RBC # BLD: 3.87 M/UL (ref 4.21–5.77)
RBC # BLD: 4.09 M/UL (ref 4.5–5.9)
RBC UA: ABNORMAL /HPF (ref 0–2)
SAMPLE SITE: ABNORMAL
SARS-COV-2, RAPID: NOT DETECTED
SARS-COV-2, RAPID: NOT DETECTED
SEG NEUTROPHILS: 67 % (ref 36–65)
SEG NEUTROPHILS: 68 % (ref 36–66)
SEG NEUTROPHILS: 77 % (ref 36–66)
SEGMENTED NEUTROPHILS ABSOLUTE COUNT: 3.81 K/UL (ref 1.5–8.1)
SEGMENTED NEUTROPHILS ABSOLUTE COUNT: 6.24 K/UL (ref 1.3–9.1)
SEGMENTED NEUTROPHILS ABSOLUTE COUNT: 9.03 K/UL (ref 1.8–7.7)
SODIUM BLD-SCNC: 134 MMOL/L (ref 135–144)
SODIUM BLD-SCNC: 136 MMOL/L (ref 135–144)
SODIUM BLD-SCNC: 136 MMOL/L (ref 135–144)
SODIUM BLD-SCNC: 137 MMOL/L (ref 135–144)
SODIUM BLD-SCNC: 138 MMOL/L (ref 135–144)
SODIUM BLD-SCNC: 138 MMOL/L (ref 135–144)
SODIUM BLD-SCNC: 139 MMOL/L (ref 135–144)
SODIUM BLD-SCNC: 139 MMOL/L (ref 135–144)
SODIUM BLD-SCNC: 141 MMOL/L (ref 135–144)
SODIUM BLD-SCNC: 141 MMOL/L (ref 135–144)
SODIUM BLD-SCNC: 142 MMOL/L (ref 135–144)
SODIUM BLD-SCNC: 143 MMOL/L (ref 135–144)
SODIUM BLD-SCNC: 143 MMOL/L (ref 135–144)
SODIUM BLD-SCNC: 146 MMOL/L (ref 135–144)
SPECIFIC GRAVITY UA: 1.04 (ref 1–1.03)
SPECIMEN DESCRIPTION: NORMAL
TOTAL CK: 1332 U/L (ref 39–308)
TOTAL PROTEIN: 5.7 G/DL (ref 6.4–8.3)
TOTAL PROTEIN: 5.7 G/DL (ref 6.4–8.3)
TOTAL PROTEIN: 6.6 G/DL (ref 6.4–8.3)
TOTAL PROTEIN: 7.1 G/DL (ref 6.4–8.3)
TROPONIN, HIGH SENSITIVITY: 106 NG/L (ref 0–22)
TROPONIN, HIGH SENSITIVITY: 21 NG/L (ref 0–22)
TROPONIN, HIGH SENSITIVITY: 22 NG/L (ref 0–22)
TROPONIN, HIGH SENSITIVITY: 32 NG/L (ref 0–22)
TROPONIN, HIGH SENSITIVITY: 33 NG/L (ref 0–22)
TROPONIN, HIGH SENSITIVITY: 38 NG/L (ref 0–22)
TROPONIN, HIGH SENSITIVITY: 40 NG/L (ref 0–22)
TROPONIN, HIGH SENSITIVITY: 43 NG/L (ref 0–22)
TROPONIN, HIGH SENSITIVITY: 43 NG/L (ref 0–22)
TROPONIN, HIGH SENSITIVITY: 47 NG/L (ref 0–22)
TROPONIN, HIGH SENSITIVITY: 92 NG/L (ref 0–22)
TURBIDITY: ABNORMAL
URINE HGB: ABNORMAL
UROBILINOGEN, URINE: NORMAL
WBC # BLD: 13.1 K/UL (ref 3.5–11.3)
WBC # BLD: 13.3 K/UL (ref 3.5–11.3)
WBC # BLD: 16 K/UL (ref 3.5–11.3)
WBC # BLD: 16.5 K/UL (ref 3.5–11.3)
WBC # BLD: 4.4 K/UL (ref 3.5–11.3)
WBC # BLD: 4.6 K/UL (ref 3.5–11.3)
WBC # BLD: 5.1 K/UL (ref 3.5–11.3)
WBC # BLD: 5.6 K/UL (ref 3.5–11.3)
WBC # BLD: 5.6 K/UL (ref 3.5–11.3)
WBC # BLD: 5.7 K/UL (ref 3.5–11.3)
WBC # BLD: 6.5 K/UL (ref 3.5–11.3)
WBC # BLD: 7.7 K/UL (ref 3.5–11.3)
WBC # BLD: 7.9 K/UL (ref 3.5–11.3)
WBC # BLD: 8.1 K/UL (ref 3.5–11)
WBC # BLD: 8.1 K/UL (ref 3.5–11.3)
WBC # BLD: 9.6 K/UL (ref 3.5–11.3)
WBC UA: ABNORMAL /HPF (ref 0–5)

## 2022-01-01 PROCEDURE — 99232 SBSQ HOSP IP/OBS MODERATE 35: CPT | Performed by: PHYSICAL MEDICINE & REHABILITATION

## 2022-01-01 PROCEDURE — 85027 COMPLETE CBC AUTOMATED: CPT

## 2022-01-01 PROCEDURE — 95720 EEG PHY/QHP EA INCR W/VEEG: CPT | Performed by: PSYCHIATRY & NEUROLOGY

## 2022-01-01 PROCEDURE — 80053 COMPREHEN METABOLIC PANEL: CPT

## 2022-01-01 PROCEDURE — 6370000000 HC RX 637 (ALT 250 FOR IP): Performed by: STUDENT IN AN ORGANIZED HEALTH CARE EDUCATION/TRAINING PROGRAM

## 2022-01-01 PROCEDURE — 2500000003 HC RX 250 WO HCPCS: Performed by: NURSE PRACTITIONER

## 2022-01-01 PROCEDURE — 92611 MOTION FLUOROSCOPY/SWALLOW: CPT

## 2022-01-01 PROCEDURE — 80048 BASIC METABOLIC PNL TOTAL CA: CPT

## 2022-01-01 PROCEDURE — 2500000003 HC RX 250 WO HCPCS: Performed by: NURSE ANESTHETIST, CERTIFIED REGISTERED

## 2022-01-01 PROCEDURE — 94003 VENT MGMT INPAT SUBQ DAY: CPT

## 2022-01-01 PROCEDURE — 2580000003 HC RX 258: Performed by: STUDENT IN AN ORGANIZED HEALTH CARE EDUCATION/TRAINING PROGRAM

## 2022-01-01 PROCEDURE — 6370000000 HC RX 637 (ALT 250 FOR IP): Performed by: NEUROLOGICAL SURGERY

## 2022-01-01 PROCEDURE — 2580000003 HC RX 258: Performed by: NURSE PRACTITIONER

## 2022-01-01 PROCEDURE — 82803 BLOOD GASES ANY COMBINATION: CPT

## 2022-01-01 PROCEDURE — 6360000002 HC RX W HCPCS: Performed by: STUDENT IN AN ORGANIZED HEALTH CARE EDUCATION/TRAINING PROGRAM

## 2022-01-01 PROCEDURE — 93010 ELECTROCARDIOGRAM REPORT: CPT | Performed by: INTERNAL MEDICINE

## 2022-01-01 PROCEDURE — 94761 N-INVAS EAR/PLS OXIMETRY MLT: CPT

## 2022-01-01 PROCEDURE — 84100 ASSAY OF PHOSPHORUS: CPT

## 2022-01-01 PROCEDURE — 85055 RETICULATED PLATELET ASSAY: CPT

## 2022-01-01 PROCEDURE — 2500000003 HC RX 250 WO HCPCS: Performed by: STUDENT IN AN ORGANIZED HEALTH CARE EDUCATION/TRAINING PROGRAM

## 2022-01-01 PROCEDURE — 3600000014 HC SURGERY LEVEL 4 ADDTL 15MIN: Performed by: NEUROLOGICAL SURGERY

## 2022-01-01 PROCEDURE — 36600 WITHDRAWAL OF ARTERIAL BLOOD: CPT

## 2022-01-01 PROCEDURE — 2000000000 HC ICU R&B

## 2022-01-01 PROCEDURE — 6360000002 HC RX W HCPCS: Performed by: NURSE PRACTITIONER

## 2022-01-01 PROCEDURE — 36620 INSERTION CATHETER ARTERY: CPT

## 2022-01-01 PROCEDURE — A4216 STERILE WATER/SALINE, 10 ML: HCPCS | Performed by: STUDENT IN AN ORGANIZED HEALTH CARE EDUCATION/TRAINING PROGRAM

## 2022-01-01 PROCEDURE — 6360000002 HC RX W HCPCS: Performed by: NURSE ANESTHETIST, CERTIFIED REGISTERED

## 2022-01-01 PROCEDURE — 2580000003 HC RX 258: Performed by: INTERNAL MEDICINE

## 2022-01-01 PROCEDURE — A4216 STERILE WATER/SALINE, 10 ML: HCPCS | Performed by: NURSE PRACTITIONER

## 2022-01-01 PROCEDURE — 2700000000 HC OXYGEN THERAPY PER DAY

## 2022-01-01 PROCEDURE — 83735 ASSAY OF MAGNESIUM: CPT

## 2022-01-01 PROCEDURE — 2580000003 HC RX 258: Performed by: EMERGENCY MEDICINE

## 2022-01-01 PROCEDURE — 36415 COLL VENOUS BLD VENIPUNCTURE: CPT

## 2022-01-01 PROCEDURE — 84484 ASSAY OF TROPONIN QUANT: CPT

## 2022-01-01 PROCEDURE — 97110 THERAPEUTIC EXERCISES: CPT

## 2022-01-01 PROCEDURE — 3600000013 HC SURGERY LEVEL 3 ADDTL 15MIN

## 2022-01-01 PROCEDURE — 82947 ASSAY GLUCOSE BLOOD QUANT: CPT

## 2022-01-01 PROCEDURE — 83605 ASSAY OF LACTIC ACID: CPT

## 2022-01-01 PROCEDURE — 95714 VEEG EA 12-26 HR UNMNTR: CPT

## 2022-01-01 PROCEDURE — 86850 RBC ANTIBODY SCREEN: CPT

## 2022-01-01 PROCEDURE — 2580000003 HC RX 258: Performed by: SURGERY

## 2022-01-01 PROCEDURE — 0BH17EZ INSERTION OF ENDOTRACHEAL AIRWAY INTO TRACHEA, VIA NATURAL OR ARTIFICIAL OPENING: ICD-10-PCS | Performed by: SURGERY

## 2022-01-01 PROCEDURE — 71045 X-RAY EXAM CHEST 1 VIEW: CPT

## 2022-01-01 PROCEDURE — 85025 COMPLETE CBC W/AUTO DIFF WBC: CPT

## 2022-01-01 PROCEDURE — 83880 ASSAY OF NATRIURETIC PEPTIDE: CPT

## 2022-01-01 PROCEDURE — 3600000004 HC SURGERY LEVEL 4 BASE: Performed by: NEUROLOGICAL SURGERY

## 2022-01-01 PROCEDURE — 72040 X-RAY EXAM NECK SPINE 2-3 VW: CPT

## 2022-01-01 PROCEDURE — 5A1945Z RESPIRATORY VENTILATION, 24-96 CONSECUTIVE HOURS: ICD-10-PCS | Performed by: SURGERY

## 2022-01-01 PROCEDURE — 6370000000 HC RX 637 (ALT 250 FOR IP): Performed by: NURSE PRACTITIONER

## 2022-01-01 PROCEDURE — 82010 KETONE BODYS QUAN: CPT

## 2022-01-01 PROCEDURE — 70450 CT HEAD/BRAIN W/O DYE: CPT

## 2022-01-01 PROCEDURE — 0RT30ZZ RESECTION OF CERVICAL VERTEBRAL DISC, OPEN APPROACH: ICD-10-PCS | Performed by: NEUROLOGICAL SURGERY

## 2022-01-01 PROCEDURE — 96374 THER/PROPH/DIAG INJ IV PUSH: CPT

## 2022-01-01 PROCEDURE — 99222 1ST HOSP IP/OBS MODERATE 55: CPT | Performed by: INTERNAL MEDICINE

## 2022-01-01 PROCEDURE — 93005 ELECTROCARDIOGRAM TRACING: CPT | Performed by: STUDENT IN AN ORGANIZED HEALTH CARE EDUCATION/TRAINING PROGRAM

## 2022-01-01 PROCEDURE — 88305 TISSUE EXAM BY PATHOLOGIST: CPT

## 2022-01-01 PROCEDURE — 2709999900 HC NON-CHARGEABLE SUPPLY

## 2022-01-01 PROCEDURE — 87635 SARS-COV-2 COVID-19 AMP PRB: CPT

## 2022-01-01 PROCEDURE — 2500000003 HC RX 250 WO HCPCS: Performed by: SURGERY

## 2022-01-01 PROCEDURE — 99232 SBSQ HOSP IP/OBS MODERATE 35: CPT | Performed by: NEUROLOGICAL SURGERY

## 2022-01-01 PROCEDURE — 94640 AIRWAY INHALATION TREATMENT: CPT

## 2022-01-01 PROCEDURE — 2500000003 HC RX 250 WO HCPCS

## 2022-01-01 PROCEDURE — 74022 RADEX COMPL AQT ABD SERIES: CPT

## 2022-01-01 PROCEDURE — 97530 THERAPEUTIC ACTIVITIES: CPT

## 2022-01-01 PROCEDURE — 82330 ASSAY OF CALCIUM: CPT

## 2022-01-01 PROCEDURE — 97167 OT EVAL HIGH COMPLEX 60 MIN: CPT

## 2022-01-01 PROCEDURE — 2720000010 HC SURG SUPPLY STERILE: Performed by: NEUROLOGICAL SURGERY

## 2022-01-01 PROCEDURE — 99231 SBSQ HOSP IP/OBS SF/LOW 25: CPT | Performed by: NEUROLOGICAL SURGERY

## 2022-01-01 PROCEDURE — 99285 EMERGENCY DEPT VISIT HI MDM: CPT

## 2022-01-01 PROCEDURE — 86900 BLOOD TYPING SEROLOGIC ABO: CPT

## 2022-01-01 PROCEDURE — 37799 UNLISTED PX VASCULAR SURGERY: CPT

## 2022-01-01 PROCEDURE — 74018 RADEX ABDOMEN 1 VIEW: CPT

## 2022-01-01 PROCEDURE — 03HY32Z INSERTION OF MONITORING DEVICE INTO UPPER ARTERY, PERCUTANEOUS APPROACH: ICD-10-PCS | Performed by: SURGERY

## 2022-01-01 PROCEDURE — 94002 VENT MGMT INPAT INIT DAY: CPT

## 2022-01-01 PROCEDURE — 2580000003 HC RX 258: Performed by: NEUROLOGICAL SURGERY

## 2022-01-01 PROCEDURE — 74230 X-RAY XM SWLNG FUNCJ C+: CPT

## 2022-01-01 PROCEDURE — 99222 1ST HOSP IP/OBS MODERATE 55: CPT | Performed by: PHYSICAL MEDICINE & REHABILITATION

## 2022-01-01 PROCEDURE — 3700000000 HC ANESTHESIA ATTENDED CARE: Performed by: NEUROLOGICAL SURGERY

## 2022-01-01 PROCEDURE — 0RT50ZZ RESECTION OF CERVICOTHORACIC VERTEBRAL DISC, OPEN APPROACH: ICD-10-PCS | Performed by: NEUROLOGICAL SURGERY

## 2022-01-01 PROCEDURE — 96375 TX/PRO/DX INJ NEW DRUG ADDON: CPT

## 2022-01-01 PROCEDURE — 95718 EEG PHYS/QHP 2-12 HR W/VEEG: CPT | Performed by: PSYCHIATRY & NEUROLOGY

## 2022-01-01 PROCEDURE — 72131 CT LUMBAR SPINE W/O DYE: CPT

## 2022-01-01 PROCEDURE — 6360000002 HC RX W HCPCS: Performed by: EMERGENCY MEDICINE

## 2022-01-01 PROCEDURE — 0RG40A0 FUSION OF CERVICOTHORACIC VERTEBRAL JOINT WITH INTERBODY FUSION DEVICE, ANTERIOR APPROACH, ANTERIOR COLUMN, OPEN APPROACH: ICD-10-PCS | Performed by: NEUROLOGICAL SURGERY

## 2022-01-01 PROCEDURE — 72128 CT CHEST SPINE W/O DYE: CPT

## 2022-01-01 PROCEDURE — 99221 1ST HOSP IP/OBS SF/LOW 40: CPT | Performed by: PSYCHIATRY & NEUROLOGY

## 2022-01-01 PROCEDURE — 93005 ELECTROCARDIOGRAM TRACING: CPT | Performed by: EMERGENCY MEDICINE

## 2022-01-01 PROCEDURE — 22854 INSJ BIOMECHANICAL DEVICE: CPT | Performed by: NEUROLOGICAL SURGERY

## 2022-01-01 PROCEDURE — 93005 ELECTROCARDIOGRAM TRACING: CPT | Performed by: NURSE PRACTITIONER

## 2022-01-01 PROCEDURE — 6360000002 HC RX W HCPCS: Performed by: INTERNAL MEDICINE

## 2022-01-01 PROCEDURE — 85730 THROMBOPLASTIN TIME PARTIAL: CPT

## 2022-01-01 PROCEDURE — 0PS304Z REPOSITION CERVICAL VERTEBRA WITH INTERNAL FIXATION DEVICE, OPEN APPROACH: ICD-10-PCS | Performed by: NEUROLOGICAL SURGERY

## 2022-01-01 PROCEDURE — 95700 EEG CONT REC W/VID EEG TECH: CPT

## 2022-01-01 PROCEDURE — 22845 INSERT SPINE FIXATION DEVICE: CPT | Performed by: NEUROLOGICAL SURGERY

## 2022-01-01 PROCEDURE — 72141 MRI NECK SPINE W/O DYE: CPT

## 2022-01-01 PROCEDURE — 05HY33Z INSERTION OF INFUSION DEVICE INTO UPPER VEIN, PERCUTANEOUS APPROACH: ICD-10-PCS | Performed by: SURGERY

## 2022-01-01 PROCEDURE — 97535 SELF CARE MNGMENT TRAINING: CPT

## 2022-01-01 PROCEDURE — 6360000002 HC RX W HCPCS

## 2022-01-01 PROCEDURE — 3700000001 HC ADD 15 MINUTES (ANESTHESIA): Performed by: NEUROLOGICAL SURGERY

## 2022-01-01 PROCEDURE — APPSS15 APP SPLIT SHARED TIME 0-15 MINUTES: Performed by: NURSE PRACTITIONER

## 2022-01-01 PROCEDURE — 93005 ELECTROCARDIOGRAM TRACING: CPT

## 2022-01-01 PROCEDURE — 86901 BLOOD TYPING SEROLOGIC RH(D): CPT

## 2022-01-01 PROCEDURE — 97116 GAIT TRAINING THERAPY: CPT

## 2022-01-01 PROCEDURE — 71260 CT THORAX DX C+: CPT

## 2022-01-01 PROCEDURE — 82550 ASSAY OF CK (CPK): CPT

## 2022-01-01 PROCEDURE — 92610 EVALUATE SWALLOWING FUNCTION: CPT

## 2022-01-01 PROCEDURE — 93005 ELECTROCARDIOGRAM TRACING: CPT | Performed by: SURGERY

## 2022-01-01 PROCEDURE — 6360000002 HC RX W HCPCS: Performed by: PSYCHIATRY & NEUROLOGY

## 2022-01-01 PROCEDURE — 22585 ARTHRD ANT NTRBD MIN DSC EA: CPT | Performed by: NEUROLOGICAL SURGERY

## 2022-01-01 PROCEDURE — 3209999900 FLUORO FOR SURGICAL PROCEDURES

## 2022-01-01 PROCEDURE — 2709999900 HC NON-CHARGEABLE SUPPLY: Performed by: NEUROLOGICAL SURGERY

## 2022-01-01 PROCEDURE — 72125 CT NECK SPINE W/O DYE: CPT

## 2022-01-01 PROCEDURE — 95711 VEEG 2-12 HR UNMONITORED: CPT

## 2022-01-01 PROCEDURE — 63081 REMOVE VERT BODY DCMPRN CRVL: CPT | Performed by: NEUROLOGICAL SURGERY

## 2022-01-01 PROCEDURE — 2580000003 HC RX 258

## 2022-01-01 PROCEDURE — 96360 HYDRATION IV INFUSION INIT: CPT

## 2022-01-01 PROCEDURE — 36556 INSERT NON-TUNNEL CV CATH: CPT

## 2022-01-01 PROCEDURE — L0120 CERV FLEX N/ADJ FOAM PRE OTS: HCPCS | Performed by: NEUROLOGICAL SURGERY

## 2022-01-01 PROCEDURE — 6360000004 HC RX CONTRAST MEDICATION: Performed by: EMERGENCY MEDICINE

## 2022-01-01 PROCEDURE — 5A12012 PERFORMANCE OF CARDIAC OUTPUT, SINGLE, MANUAL: ICD-10-PCS | Performed by: NEUROLOGICAL SURGERY

## 2022-01-01 PROCEDURE — 94664 DEMO&/EVAL PT USE INHALER: CPT

## 2022-01-01 PROCEDURE — 2500000003 HC RX 250 WO HCPCS: Performed by: NEUROLOGICAL SURGERY

## 2022-01-01 PROCEDURE — 99223 1ST HOSP IP/OBS HIGH 75: CPT | Performed by: NEUROLOGICAL SURGERY

## 2022-01-01 PROCEDURE — 6360000004 HC RX CONTRAST MEDICATION: Performed by: STUDENT IN AN ORGANIZED HEALTH CARE EDUCATION/TRAINING PROGRAM

## 2022-01-01 PROCEDURE — C1713 ANCHOR/SCREW BN/BN,TIS/BN: HCPCS | Performed by: NEUROLOGICAL SURGERY

## 2022-01-01 PROCEDURE — 31720 CLEARANCE OF AIRWAYS: CPT

## 2022-01-01 PROCEDURE — 73030 X-RAY EXAM OF SHOULDER: CPT

## 2022-01-01 PROCEDURE — 70547 MR ANGIOGRAPHY NECK W/O DYE: CPT

## 2022-01-01 PROCEDURE — 22554 ARTHRD ANT NTRBD MIN DSC CRV: CPT | Performed by: NEUROLOGICAL SURGERY

## 2022-01-01 PROCEDURE — 82248 BILIRUBIN DIRECT: CPT

## 2022-01-01 PROCEDURE — 0RG10A0 FUSION OF CERVICAL VERTEBRAL JOINT WITH INTERBODY FUSION DEVICE, ANTERIOR APPROACH, ANTERIOR COLUMN, OPEN APPROACH: ICD-10-PCS | Performed by: NEUROLOGICAL SURGERY

## 2022-01-01 PROCEDURE — 5A2204Z RESTORATION OF CARDIAC RHYTHM, SINGLE: ICD-10-PCS | Performed by: SURGERY

## 2022-01-01 PROCEDURE — G0480 DRUG TEST DEF 1-7 CLASSES: HCPCS

## 2022-01-01 PROCEDURE — 87641 MR-STAPH DNA AMP PROBE: CPT

## 2022-01-01 PROCEDURE — C1889 IMPLANT/INSERT DEVICE, NOC: HCPCS | Performed by: NEUROLOGICAL SURGERY

## 2022-01-01 PROCEDURE — 97161 PT EVAL LOW COMPLEX 20 MIN: CPT

## 2022-01-01 PROCEDURE — 3600000003 HC SURGERY LEVEL 3 BASE

## 2022-01-01 PROCEDURE — 88331 PATH CONSLTJ SURG 1 BLK 1SPC: CPT

## 2022-01-01 PROCEDURE — 6370000000 HC RX 637 (ALT 250 FOR IP): Performed by: SURGERY

## 2022-01-01 PROCEDURE — 81001 URINALYSIS AUTO W/SCOPE: CPT

## 2022-01-01 PROCEDURE — 99233 SBSQ HOSP IP/OBS HIGH 50: CPT | Performed by: PSYCHIATRY & NEUROLOGY

## 2022-01-01 PROCEDURE — 97164 PT RE-EVAL EST PLAN CARE: CPT

## 2022-01-01 PROCEDURE — 85610 PROTHROMBIN TIME: CPT

## 2022-01-01 DEVICE — IMPLANTABLE DEVICE: Type: IMPLANTABLE DEVICE | Site: SPINE CERVICAL | Status: FUNCTIONAL

## 2022-01-01 DEVICE — GRAFT BNE SUB SM 1ML CRYOPRESERVED VIABLE CORT CANC BNE: Type: IMPLANTABLE DEVICE | Site: SPINE CERVICAL | Status: FUNCTIONAL

## 2022-01-01 DEVICE — SCREW SPNL L16MM DIA4MM ANT CERV TI SELF DRL VAR ANG FULL: Type: IMPLANTABLE DEVICE | Site: SPINE CERVICAL | Status: FUNCTIONAL

## 2022-01-01 RX ORDER — POTASSIUM CHLORIDE 7.45 MG/ML
10 INJECTION INTRAVENOUS ONCE
Status: DISCONTINUED | OUTPATIENT
Start: 2022-01-01 | End: 2022-01-01 | Stop reason: DRUGHIGH

## 2022-01-01 RX ORDER — DEXTROSE MONOHYDRATE 25 G/50ML
12.5 INJECTION, SOLUTION INTRAVENOUS PRN
Status: DISCONTINUED | OUTPATIENT
Start: 2022-01-01 | End: 2022-01-01

## 2022-01-01 RX ORDER — PANTOPRAZOLE SODIUM 40 MG/1
TABLET, DELAYED RELEASE ORAL
Qty: 90 TABLET | Refills: 5 | Status: SHIPPED | OUTPATIENT
Start: 2022-01-01

## 2022-01-01 RX ORDER — FUROSEMIDE 10 MG/ML
40 INJECTION INTRAMUSCULAR; INTRAVENOUS ONCE
Status: COMPLETED | OUTPATIENT
Start: 2022-01-01 | End: 2022-01-01

## 2022-01-01 RX ORDER — DIGOXIN 0.25 MG/ML
250 INJECTION INTRAMUSCULAR; INTRAVENOUS ONCE
Status: COMPLETED | OUTPATIENT
Start: 2022-01-01 | End: 2022-01-01

## 2022-01-01 RX ORDER — LEVETIRACETAM 5 MG/ML
500 INJECTION INTRAVASCULAR EVERY 12 HOURS
Status: DISCONTINUED | OUTPATIENT
Start: 2022-01-01 | End: 2022-01-01

## 2022-01-01 RX ORDER — LORAZEPAM 2 MG/ML
1 INJECTION INTRAMUSCULAR ONCE
Status: COMPLETED | OUTPATIENT
Start: 2022-01-01 | End: 2022-01-01

## 2022-01-01 RX ORDER — CHLORHEXIDINE GLUCONATE 0.12 MG/ML
15 RINSE ORAL 2 TIMES DAILY
Status: DISCONTINUED | OUTPATIENT
Start: 2022-01-01 | End: 2022-01-01

## 2022-01-01 RX ORDER — CEFAZOLIN SODIUM 1 G/3ML
INJECTION, POWDER, FOR SOLUTION INTRAMUSCULAR; INTRAVENOUS PRN
Status: DISCONTINUED | OUTPATIENT
Start: 2022-01-01 | End: 2022-01-01 | Stop reason: SDUPTHER

## 2022-01-01 RX ORDER — TRAZODONE HYDROCHLORIDE 150 MG/1
TABLET ORAL
Qty: 90 TABLET | Refills: 0 | Status: SHIPPED | OUTPATIENT
Start: 2022-01-01

## 2022-01-01 RX ORDER — MIDAZOLAM HYDROCHLORIDE 1 MG/ML
INJECTION INTRAMUSCULAR; INTRAVENOUS PRN
Status: DISCONTINUED | OUTPATIENT
Start: 2022-01-01 | End: 2022-01-01 | Stop reason: SDUPTHER

## 2022-01-01 RX ORDER — FUROSEMIDE 10 MG/ML
40 INJECTION INTRAMUSCULAR; INTRAVENOUS ONCE
Status: DISCONTINUED | OUTPATIENT
Start: 2022-01-01 | End: 2022-01-01

## 2022-01-01 RX ORDER — METHOCARBAMOL 500 MG/1
750 TABLET, FILM COATED ORAL 3 TIMES DAILY
Status: DISCONTINUED | OUTPATIENT
Start: 2022-01-01 | End: 2022-01-01

## 2022-01-01 RX ORDER — GLYCOPYRROLATE 1 MG/5 ML
0.2 SYRINGE (ML) INTRAVENOUS ONCE
Status: DISCONTINUED | OUTPATIENT
Start: 2022-01-01 | End: 2022-01-01

## 2022-01-01 RX ORDER — DIAZEPAM 5 MG/1
5 TABLET ORAL EVERY 6 HOURS PRN
Status: DISCONTINUED | OUTPATIENT
Start: 2022-01-01 | End: 2022-01-01

## 2022-01-01 RX ORDER — ATORVASTATIN CALCIUM 20 MG/1
20 TABLET, FILM COATED ORAL NIGHTLY
Status: DISCONTINUED | OUTPATIENT
Start: 2022-01-01 | End: 2022-01-01

## 2022-01-01 RX ORDER — PANTOPRAZOLE SODIUM 40 MG/1
40 TABLET, DELAYED RELEASE ORAL DAILY
Qty: 30 TABLET | Refills: 5 | Status: SHIPPED | OUTPATIENT
Start: 2022-01-01

## 2022-01-01 RX ORDER — ONDANSETRON 4 MG/1
4 TABLET, ORALLY DISINTEGRATING ORAL EVERY 8 HOURS PRN
Status: DISCONTINUED | OUTPATIENT
Start: 2022-01-01 | End: 2022-01-01 | Stop reason: HOSPADM

## 2022-01-01 RX ORDER — ONDANSETRON 2 MG/ML
4 INJECTION INTRAMUSCULAR; INTRAVENOUS ONCE
Status: DISCONTINUED | OUTPATIENT
Start: 2022-01-01 | End: 2022-01-01

## 2022-01-01 RX ORDER — FENTANYL CITRATE 50 UG/ML
100 INJECTION, SOLUTION INTRAMUSCULAR; INTRAVENOUS
Status: DISCONTINUED | OUTPATIENT
Start: 2022-01-01 | End: 2022-01-01

## 2022-01-01 RX ORDER — FENTANYL CITRATE 50 UG/ML
25 INJECTION, SOLUTION INTRAMUSCULAR; INTRAVENOUS
Status: DISCONTINUED | OUTPATIENT
Start: 2022-01-01 | End: 2022-01-01

## 2022-01-01 RX ORDER — POTASSIUM CHLORIDE 29.8 MG/ML
40 INJECTION INTRAVENOUS ONCE
Status: COMPLETED | OUTPATIENT
Start: 2022-01-01 | End: 2022-01-01

## 2022-01-01 RX ORDER — PROPOFOL 10 MG/ML
5-30 INJECTION, EMULSION INTRAVENOUS CONTINUOUS
Status: DISCONTINUED | OUTPATIENT
Start: 2022-01-01 | End: 2022-01-01

## 2022-01-01 RX ORDER — AMIODARONE HYDROCHLORIDE 50 MG/ML
INJECTION, SOLUTION INTRAVENOUS
Status: COMPLETED
Start: 2022-01-01 | End: 2022-01-01

## 2022-01-01 RX ORDER — 0.9 % SODIUM CHLORIDE 0.9 %
1000 INTRAVENOUS SOLUTION INTRAVENOUS ONCE
Status: COMPLETED | OUTPATIENT
Start: 2022-01-01 | End: 2022-01-01

## 2022-01-01 RX ORDER — 0.9 % SODIUM CHLORIDE 0.9 %
80 INTRAVENOUS SOLUTION INTRAVENOUS ONCE
Status: COMPLETED | OUTPATIENT
Start: 2022-01-01 | End: 2022-01-01

## 2022-01-01 RX ORDER — FENTANYL CITRATE 50 UG/ML
50 INJECTION, SOLUTION INTRAMUSCULAR; INTRAVENOUS ONCE
Status: COMPLETED | OUTPATIENT
Start: 2022-01-01 | End: 2022-01-01

## 2022-01-01 RX ORDER — NOREPINEPHRINE BIT/0.9 % NACL 16MG/250ML
1-100 INFUSION BOTTLE (ML) INTRAVENOUS CONTINUOUS
Status: DISCONTINUED | OUTPATIENT
Start: 2022-01-01 | End: 2022-01-01

## 2022-01-01 RX ORDER — GABAPENTIN 300 MG/1
300 CAPSULE ORAL EVERY 8 HOURS
Status: DISCONTINUED | OUTPATIENT
Start: 2022-01-01 | End: 2022-01-01

## 2022-01-01 RX ORDER — DEXTROSE MONOHYDRATE 50 MG/ML
100 INJECTION, SOLUTION INTRAVENOUS PRN
Status: DISCONTINUED | OUTPATIENT
Start: 2022-01-01 | End: 2022-01-01

## 2022-01-01 RX ORDER — MAGNESIUM SULFATE IN WATER 40 MG/ML
2000 INJECTION, SOLUTION INTRAVENOUS ONCE
Status: COMPLETED | OUTPATIENT
Start: 2022-01-01 | End: 2022-01-01

## 2022-01-01 RX ORDER — ENOXAPARIN SODIUM 100 MG/ML
30 INJECTION SUBCUTANEOUS 2 TIMES DAILY
Status: DISCONTINUED | OUTPATIENT
Start: 2022-01-01 | End: 2022-01-01

## 2022-01-01 RX ORDER — PROPOFOL 10 MG/ML
5-50 INJECTION, EMULSION INTRAVENOUS CONTINUOUS
Status: DISCONTINUED | OUTPATIENT
Start: 2022-01-01 | End: 2022-01-01

## 2022-01-01 RX ORDER — HEPARIN SODIUM 1000 [USP'U]/ML
40 INJECTION, SOLUTION INTRAVENOUS; SUBCUTANEOUS PRN
Status: DISCONTINUED | OUTPATIENT
Start: 2022-01-01 | End: 2022-01-01

## 2022-01-01 RX ORDER — QUETIAPINE FUMARATE 100 MG/1
100 TABLET, FILM COATED ORAL EVERY 6 HOURS
Status: DISCONTINUED | OUTPATIENT
Start: 2022-01-01 | End: 2022-01-01

## 2022-01-01 RX ORDER — METOCLOPRAMIDE HYDROCHLORIDE 5 MG/ML
10 INJECTION INTRAMUSCULAR; INTRAVENOUS EVERY 6 HOURS
Status: COMPLETED | OUTPATIENT
Start: 2022-01-01 | End: 2022-01-01

## 2022-01-01 RX ORDER — AZELASTINE 1 MG/ML
1 SPRAY, METERED NASAL 2 TIMES DAILY
Qty: 30 ML | Refills: 2 | Status: SHIPPED | OUTPATIENT
Start: 2022-01-01

## 2022-01-01 RX ORDER — LORAZEPAM 2 MG/ML
2 INJECTION INTRAMUSCULAR ONCE
Status: COMPLETED | OUTPATIENT
Start: 2022-01-01 | End: 2022-01-01

## 2022-01-01 RX ORDER — SODIUM CHLORIDE, SODIUM LACTATE, POTASSIUM CHLORIDE, CALCIUM CHLORIDE 600; 310; 30; 20 MG/100ML; MG/100ML; MG/100ML; MG/100ML
INJECTION, SOLUTION INTRAVENOUS CONTINUOUS PRN
Status: DISCONTINUED | OUTPATIENT
Start: 2022-01-01 | End: 2022-01-01 | Stop reason: SDUPTHER

## 2022-01-01 RX ORDER — SODIUM CHLORIDE 0.9 % (FLUSH) 0.9 %
5-40 SYRINGE (ML) INJECTION EVERY 12 HOURS SCHEDULED
Status: DISCONTINUED | OUTPATIENT
Start: 2022-01-01 | End: 2022-01-01

## 2022-01-01 RX ORDER — GLYCOPYRROLATE 0.2 MG/ML
0.2 INJECTION INTRAMUSCULAR; INTRAVENOUS EVERY 4 HOURS PRN
Status: DISCONTINUED | OUTPATIENT
Start: 2022-01-01 | End: 2022-01-01 | Stop reason: HOSPADM

## 2022-01-01 RX ORDER — FENTANYL CITRATE 50 UG/ML
50 INJECTION, SOLUTION INTRAMUSCULAR; INTRAVENOUS
Status: DISCONTINUED | OUTPATIENT
Start: 2022-01-01 | End: 2022-01-01

## 2022-01-01 RX ORDER — LORAZEPAM 2 MG/ML
1 INJECTION INTRAMUSCULAR ONCE
Status: DISCONTINUED | OUTPATIENT
Start: 2022-01-01 | End: 2022-01-01

## 2022-01-01 RX ORDER — OXYCODONE HYDROCHLORIDE 5 MG/1
2.5 TABLET ORAL EVERY 6 HOURS PRN
Status: DISCONTINUED | OUTPATIENT
Start: 2022-01-01 | End: 2022-01-01 | Stop reason: HOSPADM

## 2022-01-01 RX ORDER — MORPHINE SULFATE 4 MG/ML
4 INJECTION, SOLUTION INTRAMUSCULAR; INTRAVENOUS
Status: DISCONTINUED | OUTPATIENT
Start: 2022-01-01 | End: 2022-01-01 | Stop reason: HOSPADM

## 2022-01-01 RX ORDER — ALBUTEROL SULFATE 90 UG/1
AEROSOL, METERED RESPIRATORY (INHALATION)
Qty: 25.5 G | Refills: 3 | Status: SHIPPED | OUTPATIENT
Start: 2022-01-01

## 2022-01-01 RX ORDER — LIDOCAINE HYDROCHLORIDE AND EPINEPHRINE 10; 10 MG/ML; UG/ML
INJECTION, SOLUTION INFILTRATION; PERINEURAL PRN
Status: DISCONTINUED | OUTPATIENT
Start: 2022-01-01 | End: 2022-01-01 | Stop reason: ALTCHOICE

## 2022-01-01 RX ORDER — LORAZEPAM 2 MG/ML
1 INJECTION INTRAMUSCULAR
Status: DISCONTINUED | OUTPATIENT
Start: 2022-01-01 | End: 2022-01-01 | Stop reason: HOSPADM

## 2022-01-01 RX ORDER — SODIUM CHLORIDE 0.9 % (FLUSH) 0.9 %
10 SYRINGE (ML) INJECTION PRN
Status: DISCONTINUED | OUTPATIENT
Start: 2022-01-01 | End: 2022-01-01 | Stop reason: HOSPADM

## 2022-01-01 RX ORDER — POLYETHYLENE GLYCOL 3350 17 G/17G
17 POWDER, FOR SOLUTION ORAL DAILY
Status: DISCONTINUED | OUTPATIENT
Start: 2022-01-01 | End: 2022-01-01

## 2022-01-01 RX ORDER — METOPROLOL TARTRATE 5 MG/5ML
5 INJECTION INTRAVENOUS ONCE
Status: DISCONTINUED | OUTPATIENT
Start: 2022-01-01 | End: 2022-01-01

## 2022-01-01 RX ORDER — MAGNESIUM HYDROXIDE 1200 MG/15ML
LIQUID ORAL CONTINUOUS PRN
Status: COMPLETED | OUTPATIENT
Start: 2022-01-01 | End: 2022-01-01

## 2022-01-01 RX ORDER — DIAZEPAM 5 MG/1
5 TABLET ORAL EVERY 6 HOURS
Status: DISCONTINUED | OUTPATIENT
Start: 2022-01-01 | End: 2022-01-01

## 2022-01-01 RX ORDER — FENTANYL CITRATE 50 UG/ML
50 INJECTION, SOLUTION INTRAMUSCULAR; INTRAVENOUS
Status: DISCONTINUED | OUTPATIENT
Start: 2022-01-01 | End: 2022-01-01 | Stop reason: HOSPADM

## 2022-01-01 RX ORDER — ATROPINE SULFATE 0.1 MG/ML
INJECTION INTRAVENOUS
Status: DISPENSED
Start: 2022-01-01 | End: 2022-01-01

## 2022-01-01 RX ORDER — TRAZODONE HYDROCHLORIDE 150 MG/1
TABLET ORAL
Qty: 90 TABLET | Refills: 0 | Status: SHIPPED | OUTPATIENT
Start: 2022-01-01 | End: 2022-01-01

## 2022-01-01 RX ORDER — BISACODYL 10 MG
10 SUPPOSITORY, RECTAL RECTAL DAILY PRN
Status: DISCONTINUED | OUTPATIENT
Start: 2022-01-01 | End: 2022-01-01

## 2022-01-01 RX ORDER — ROCURONIUM BROMIDE 10 MG/ML
INJECTION, SOLUTION INTRAVENOUS PRN
Status: DISCONTINUED | OUTPATIENT
Start: 2022-01-01 | End: 2022-01-01 | Stop reason: SDUPTHER

## 2022-01-01 RX ORDER — CHOLECALCIFEROL (VITAMIN D3) 125 MCG
5 CAPSULE ORAL NIGHTLY
Status: DISCONTINUED | OUTPATIENT
Start: 2022-01-01 | End: 2022-01-01

## 2022-01-01 RX ORDER — METHOCARBAMOL 750 MG/1
750 TABLET, FILM COATED ORAL EVERY 6 HOURS
Status: DISCONTINUED | OUTPATIENT
Start: 2022-01-01 | End: 2022-01-01

## 2022-01-01 RX ORDER — DIGOXIN 0.25 MG/ML
250 INJECTION INTRAMUSCULAR; INTRAVENOUS DAILY
Status: DISCONTINUED | OUTPATIENT
Start: 2022-01-01 | End: 2022-01-01

## 2022-01-01 RX ORDER — MORPHINE SULFATE 2 MG/ML
2 INJECTION, SOLUTION INTRAMUSCULAR; INTRAVENOUS
Status: DISCONTINUED | OUTPATIENT
Start: 2022-01-01 | End: 2022-01-01 | Stop reason: HOSPADM

## 2022-01-01 RX ORDER — MONTELUKAST SODIUM 10 MG/1
10 TABLET ORAL NIGHTLY
Qty: 30 TABLET | Refills: 2 | Status: SHIPPED | OUTPATIENT
Start: 2022-01-01

## 2022-01-01 RX ORDER — FENTANYL CITRATE-0.9 % NACL/PF 10 MCG/ML
25-200 PLASTIC BAG, INJECTION (ML) INTRAVENOUS CONTINUOUS
Status: DISCONTINUED | OUTPATIENT
Start: 2022-01-01 | End: 2022-01-01 | Stop reason: SDUPTHER

## 2022-01-01 RX ORDER — TRAZODONE HYDROCHLORIDE 50 MG/1
50 TABLET ORAL NIGHTLY
Status: DISCONTINUED | OUTPATIENT
Start: 2022-01-01 | End: 2022-01-01

## 2022-01-01 RX ORDER — SODIUM CHLORIDE, SODIUM LACTATE, POTASSIUM CHLORIDE, CALCIUM CHLORIDE 600; 310; 30; 20 MG/100ML; MG/100ML; MG/100ML; MG/100ML
INJECTION, SOLUTION INTRAVENOUS CONTINUOUS
Status: DISCONTINUED | OUTPATIENT
Start: 2022-01-01 | End: 2022-01-01 | Stop reason: HOSPADM

## 2022-01-01 RX ORDER — PANTOPRAZOLE SODIUM 40 MG/1
40 TABLET, DELAYED RELEASE ORAL DAILY
Status: DISCONTINUED | OUTPATIENT
Start: 2022-01-01 | End: 2022-01-01

## 2022-01-01 RX ORDER — QUETIAPINE FUMARATE 100 MG/1
100 TABLET, FILM COATED ORAL 2 TIMES DAILY
Status: DISCONTINUED | OUTPATIENT
Start: 2022-01-01 | End: 2022-01-01

## 2022-01-01 RX ORDER — KETAMINE HYDROCHLORIDE 10 MG/ML
INJECTION, SOLUTION INTRAMUSCULAR; INTRAVENOUS PRN
Status: DISCONTINUED | OUTPATIENT
Start: 2022-01-01 | End: 2022-01-01 | Stop reason: SDUPTHER

## 2022-01-01 RX ORDER — SODIUM CHLORIDE, SODIUM LACTATE, POTASSIUM CHLORIDE, AND CALCIUM CHLORIDE .6; .31; .03; .02 G/100ML; G/100ML; G/100ML; G/100ML
1000 INJECTION, SOLUTION INTRAVENOUS ONCE
Status: COMPLETED | OUTPATIENT
Start: 2022-01-01 | End: 2022-01-01

## 2022-01-01 RX ORDER — QUETIAPINE FUMARATE 25 MG/1
50 TABLET, FILM COATED ORAL DAILY
Status: DISCONTINUED | OUTPATIENT
Start: 2022-01-01 | End: 2022-01-01

## 2022-01-01 RX ORDER — LORAZEPAM 2 MG/ML
2 INJECTION INTRAMUSCULAR
Status: DISCONTINUED | OUTPATIENT
Start: 2022-01-01 | End: 2022-01-01 | Stop reason: HOSPADM

## 2022-01-01 RX ORDER — HEPARIN SODIUM 1000 [USP'U]/ML
30000 INJECTION, SOLUTION INTRAVENOUS; SUBCUTANEOUS ONCE
Status: COMPLETED | OUTPATIENT
Start: 2022-01-01 | End: 2022-01-01

## 2022-01-01 RX ORDER — POLYVINYL ALCOHOL 14 MG/ML
1 SOLUTION/ DROPS OPHTHALMIC PRN
Status: DISCONTINUED | OUTPATIENT
Start: 2022-01-01 | End: 2022-01-01

## 2022-01-01 RX ORDER — LORAZEPAM 2 MG/ML
0.5 INJECTION INTRAMUSCULAR
Status: DISCONTINUED | OUTPATIENT
Start: 2022-01-01 | End: 2022-01-01 | Stop reason: HOSPADM

## 2022-01-01 RX ORDER — ACETAMINOPHEN 500 MG
1000 TABLET ORAL EVERY 8 HOURS SCHEDULED
Status: DISCONTINUED | OUTPATIENT
Start: 2022-01-01 | End: 2022-01-01

## 2022-01-01 RX ORDER — OXYCODONE HYDROCHLORIDE 5 MG/1
5 TABLET ORAL EVERY 6 HOURS PRN
Status: DISCONTINUED | OUTPATIENT
Start: 2022-01-01 | End: 2022-01-01

## 2022-01-01 RX ORDER — IPRATROPIUM BROMIDE AND ALBUTEROL SULFATE 2.5; .5 MG/3ML; MG/3ML
1 SOLUTION RESPIRATORY (INHALATION) ONCE
Status: COMPLETED | OUTPATIENT
Start: 2022-01-01 | End: 2022-01-01

## 2022-01-01 RX ORDER — HYDRALAZINE HYDROCHLORIDE 20 MG/ML
10 INJECTION INTRAMUSCULAR; INTRAVENOUS EVERY 6 HOURS PRN
Status: DISCONTINUED | OUTPATIENT
Start: 2022-01-01 | End: 2022-01-01

## 2022-01-01 RX ORDER — TRAZODONE HYDROCHLORIDE 100 MG/1
100 TABLET ORAL NIGHTLY
Status: DISCONTINUED | OUTPATIENT
Start: 2022-01-01 | End: 2022-01-01

## 2022-01-01 RX ORDER — GABAPENTIN 300 MG/1
300 CAPSULE ORAL 3 TIMES DAILY
Status: DISCONTINUED | OUTPATIENT
Start: 2022-01-01 | End: 2022-01-01

## 2022-01-01 RX ORDER — MIDAZOLAM HYDROCHLORIDE 2 MG/2ML
2 INJECTION, SOLUTION INTRAMUSCULAR; INTRAVENOUS ONCE
Status: COMPLETED | OUTPATIENT
Start: 2022-01-01 | End: 2022-01-01

## 2022-01-01 RX ORDER — DIAZEPAM 5 MG/1
10 TABLET ORAL EVERY 6 HOURS
Status: DISCONTINUED | OUTPATIENT
Start: 2022-01-01 | End: 2022-01-01

## 2022-01-01 RX ORDER — SODIUM CHLORIDE, SODIUM LACTATE, POTASSIUM CHLORIDE, AND CALCIUM CHLORIDE .6; .31; .03; .02 G/100ML; G/100ML; G/100ML; G/100ML
500 INJECTION, SOLUTION INTRAVENOUS ONCE
Status: COMPLETED | OUTPATIENT
Start: 2022-01-01 | End: 2022-01-01

## 2022-01-01 RX ORDER — IBUPROFEN 400 MG/1
400 TABLET ORAL EVERY 4 HOURS
Status: DISCONTINUED | OUTPATIENT
Start: 2022-01-01 | End: 2022-01-01

## 2022-01-01 RX ORDER — DEXTROSE AND SODIUM CHLORIDE 5; .9 G/100ML; G/100ML
INJECTION, SOLUTION INTRAVENOUS CONTINUOUS
Status: DISCONTINUED | OUTPATIENT
Start: 2022-01-01 | End: 2022-01-01 | Stop reason: HOSPADM

## 2022-01-01 RX ORDER — ALBUTEROL SULFATE 90 UG/1
2 AEROSOL, METERED RESPIRATORY (INHALATION) EVERY 4 HOURS PRN
Status: DISCONTINUED | OUTPATIENT
Start: 2022-01-01 | End: 2022-01-01

## 2022-01-01 RX ORDER — MAGNESIUM SULFATE HEPTAHYDRATE 40 MG/ML
4000 INJECTION, SOLUTION INTRAVENOUS ONCE
Status: COMPLETED | OUTPATIENT
Start: 2022-01-01 | End: 2022-01-01

## 2022-01-01 RX ORDER — ONDANSETRON 2 MG/ML
4 INJECTION INTRAMUSCULAR; INTRAVENOUS EVERY 6 HOURS PRN
Status: DISCONTINUED | OUTPATIENT
Start: 2022-01-01 | End: 2022-01-01 | Stop reason: HOSPADM

## 2022-01-01 RX ORDER — FAMOTIDINE 20 MG/1
20 TABLET, FILM COATED ORAL 2 TIMES DAILY
Status: DISCONTINUED | OUTPATIENT
Start: 2022-01-01 | End: 2022-01-01

## 2022-01-01 RX ORDER — DEXMEDETOMIDINE HYDROCHLORIDE 4 UG/ML
.1-1.5 INJECTION, SOLUTION INTRAVENOUS CONTINUOUS
Status: DISCONTINUED | OUTPATIENT
Start: 2022-01-01 | End: 2022-01-01

## 2022-01-01 RX ORDER — METOPROLOL TARTRATE 5 MG/5ML
INJECTION INTRAVENOUS PRN
Status: DISCONTINUED | OUTPATIENT
Start: 2022-01-01 | End: 2022-01-01 | Stop reason: SDUPTHER

## 2022-01-01 RX ORDER — HEPARIN SODIUM 1000 [USP'U]/ML
80 INJECTION, SOLUTION INTRAVENOUS; SUBCUTANEOUS PRN
Status: DISCONTINUED | OUTPATIENT
Start: 2022-01-01 | End: 2022-01-01

## 2022-01-01 RX ORDER — OXYCODONE HYDROCHLORIDE 5 MG/1
5 TABLET ORAL EVERY 4 HOURS PRN
Status: DISCONTINUED | OUTPATIENT
Start: 2022-01-01 | End: 2022-01-01

## 2022-01-01 RX ORDER — ATORVASTATIN CALCIUM 20 MG/1
20 TABLET, FILM COATED ORAL DAILY
Qty: 90 TABLET | Refills: 3 | Status: SHIPPED | OUTPATIENT
Start: 2022-01-01

## 2022-01-01 RX ORDER — CHOLECALCIFEROL (VITAMIN D3) 125 MCG
5 CAPSULE ORAL NIGHTLY PRN
Status: DISCONTINUED | OUTPATIENT
Start: 2022-01-01 | End: 2022-01-01

## 2022-01-01 RX ORDER — QUETIAPINE FUMARATE 25 MG/1
50 TABLET, FILM COATED ORAL EVERY 6 HOURS
Status: DISCONTINUED | OUTPATIENT
Start: 2022-01-01 | End: 2022-01-01

## 2022-01-01 RX ORDER — SODIUM FLUORIDE AND POTASSIUM NITRATE 5.8; 57.5 MG/ML; MG/ML
GEL, DENTIFRICE DENTAL
Refills: 0 | OUTPATIENT
Start: 2022-01-01

## 2022-01-01 RX ORDER — PHENYLEPHRINE HCL IN 0.9% NACL 0.5 MG/5ML
SYRINGE (ML) INTRAVENOUS PRN
Status: DISCONTINUED | OUTPATIENT
Start: 2022-01-01 | End: 2022-01-01 | Stop reason: SDUPTHER

## 2022-01-01 RX ORDER — FENTANYL CITRATE 50 UG/ML
100 INJECTION, SOLUTION INTRAMUSCULAR; INTRAVENOUS ONCE
Status: COMPLETED | OUTPATIENT
Start: 2022-01-01 | End: 2022-01-01

## 2022-01-01 RX ORDER — INSULIN LISPRO 100 [IU]/ML
0-18 INJECTION, SOLUTION INTRAVENOUS; SUBCUTANEOUS EVERY 4 HOURS
Status: DISCONTINUED | OUTPATIENT
Start: 2022-01-01 | End: 2022-01-01

## 2022-01-01 RX ORDER — FOLIC ACID 1 MG/1
1 TABLET ORAL DAILY
Status: DISCONTINUED | OUTPATIENT
Start: 2022-01-01 | End: 2022-01-01

## 2022-01-01 RX ORDER — SENNA AND DOCUSATE SODIUM 50; 8.6 MG/1; MG/1
1 TABLET, FILM COATED ORAL 2 TIMES DAILY
Status: DISCONTINUED | OUTPATIENT
Start: 2022-01-01 | End: 2022-01-01

## 2022-01-01 RX ORDER — GLYCOPYRROLATE 0.2 MG/ML
0.2 INJECTION INTRAMUSCULAR; INTRAVENOUS ONCE
Status: COMPLETED | OUTPATIENT
Start: 2022-01-01 | End: 2022-01-01

## 2022-01-01 RX ORDER — MORPHINE SULFATE 4 MG/ML
4 INJECTION, SOLUTION INTRAMUSCULAR; INTRAVENOUS ONCE
Status: COMPLETED | OUTPATIENT
Start: 2022-01-01 | End: 2022-01-01

## 2022-01-01 RX ORDER — SODIUM CHLORIDE 9 MG/ML
INJECTION, SOLUTION INTRAVENOUS PRN
Status: DISCONTINUED | OUTPATIENT
Start: 2022-01-01 | End: 2022-01-01

## 2022-01-01 RX ORDER — SODIUM CHLORIDE 0.9 % (FLUSH) 0.9 %
5-40 SYRINGE (ML) INJECTION PRN
Status: DISCONTINUED | OUTPATIENT
Start: 2022-01-01 | End: 2022-01-01

## 2022-01-01 RX ORDER — ACETAMINOPHEN 500 MG
1000 TABLET ORAL EVERY 8 HOURS
Status: DISCONTINUED | OUTPATIENT
Start: 2022-01-01 | End: 2022-01-01 | Stop reason: HOSPADM

## 2022-01-01 RX ORDER — FENTANYL CITRATE 50 UG/ML
INJECTION, SOLUTION INTRAMUSCULAR; INTRAVENOUS PRN
Status: DISCONTINUED | OUTPATIENT
Start: 2022-01-01 | End: 2022-01-01 | Stop reason: SDUPTHER

## 2022-01-01 RX ORDER — QUETIAPINE FUMARATE 100 MG/1
100 TABLET, FILM COATED ORAL NIGHTLY
Status: DISCONTINUED | OUTPATIENT
Start: 2022-01-01 | End: 2022-01-01

## 2022-01-01 RX ORDER — QUETIAPINE FUMARATE 25 MG/1
50 TABLET, FILM COATED ORAL 2 TIMES DAILY
Status: DISCONTINUED | OUTPATIENT
Start: 2022-01-01 | End: 2022-01-01

## 2022-01-01 RX ADMIN — SODIUM CHLORIDE, POTASSIUM CHLORIDE, SODIUM LACTATE AND CALCIUM CHLORIDE: 600; 310; 30; 20 INJECTION, SOLUTION INTRAVENOUS at 15:29

## 2022-01-01 RX ADMIN — DEXMEDETOMIDINE HYDROCHLORIDE 1 MCG/KG/HR: 4 INJECTION, SOLUTION INTRAVENOUS at 03:35

## 2022-01-01 RX ADMIN — Medication 5 MG: at 20:00

## 2022-01-01 RX ADMIN — FOLIC ACID 1 MG: 1 TABLET ORAL at 10:04

## 2022-01-01 RX ADMIN — AMIODARONE HYDROCHLORIDE 1 MG/MIN: 50 INJECTION, SOLUTION INTRAVENOUS at 09:38

## 2022-01-01 RX ADMIN — FOLIC ACID 1 MG: 1 TABLET ORAL at 08:20

## 2022-01-01 RX ADMIN — IBUPROFEN 400 MG: 400 TABLET, FILM COATED ORAL at 10:03

## 2022-01-01 RX ADMIN — FENTANYL CITRATE 50 MCG: 50 INJECTION, SOLUTION INTRAMUSCULAR; INTRAVENOUS at 16:45

## 2022-01-01 RX ADMIN — DIAZEPAM 10 MG: 5 TABLET ORAL at 05:07

## 2022-01-01 RX ADMIN — ENOXAPARIN SODIUM 30 MG: 100 INJECTION SUBCUTANEOUS at 09:20

## 2022-01-01 RX ADMIN — FENTANYL CITRATE 50 MCG: 50 INJECTION, SOLUTION INTRAMUSCULAR; INTRAVENOUS at 05:54

## 2022-01-01 RX ADMIN — DEXTROSE 150 MG: 50 INJECTION, SOLUTION INTRAVENOUS at 09:28

## 2022-01-01 RX ADMIN — POLYETHYLENE GLYCOL 3350 17 G: 17 POWDER, FOR SOLUTION ORAL at 08:03

## 2022-01-01 RX ADMIN — FOLIC ACID 1 MG: 1 TABLET ORAL at 08:07

## 2022-01-01 RX ADMIN — Medication 5 MG: at 00:44

## 2022-01-01 RX ADMIN — DIAZEPAM 10 MG: 5 TABLET ORAL at 05:18

## 2022-01-01 RX ADMIN — DEXMEDETOMIDINE HYDROCHLORIDE 1.2 MCG/KG/HR: 4 INJECTION, SOLUTION INTRAVENOUS at 07:58

## 2022-01-01 RX ADMIN — HYDRALAZINE HYDROCHLORIDE 10 MG: 20 INJECTION INTRAMUSCULAR; INTRAVENOUS at 19:28

## 2022-01-01 RX ADMIN — CHLORHEXIDINE GLUCONATE 0.12% ORAL RINSE 15 ML: 1.2 LIQUID ORAL at 21:52

## 2022-01-01 RX ADMIN — METOCLOPRAMIDE 10 MG: 5 INJECTION, SOLUTION INTRAMUSCULAR; INTRAVENOUS at 09:57

## 2022-01-01 RX ADMIN — SODIUM CHLORIDE, SODIUM LACTATE, POTASSIUM CHLORIDE, CALCIUM CHLORIDE: 600; 310; 30; 20 INJECTION, SOLUTION INTRAVENOUS at 15:42

## 2022-01-01 RX ADMIN — SODIUM CHLORIDE, POTASSIUM CHLORIDE, SODIUM LACTATE AND CALCIUM CHLORIDE: 600; 310; 30; 20 INJECTION, SOLUTION INTRAVENOUS at 21:23

## 2022-01-01 RX ADMIN — FOLIC ACID 1 MG: 1 TABLET ORAL at 07:44

## 2022-01-01 RX ADMIN — MORPHINE SULFATE 4 MG: 4 INJECTION, SOLUTION INTRAMUSCULAR; INTRAVENOUS at 12:45

## 2022-01-01 RX ADMIN — FENTANYL CITRATE 50 MCG: 50 INJECTION, SOLUTION INTRAMUSCULAR; INTRAVENOUS at 16:11

## 2022-01-01 RX ADMIN — Medication 50 MCG/HR: at 11:29

## 2022-01-01 RX ADMIN — DIAZEPAM 10 MG: 5 TABLET ORAL at 23:00

## 2022-01-01 RX ADMIN — DIBASIC SODIUM PHOSPHATE, MONOBASIC POTASSIUM PHOSPHATE AND MONOBASIC SODIUM PHOSPHATE 2 TABLET: 852; 155; 130 TABLET ORAL at 20:57

## 2022-01-01 RX ADMIN — SODIUM CHLORIDE, PRESERVATIVE FREE 20 MG: 5 INJECTION INTRAVENOUS at 22:05

## 2022-01-01 RX ADMIN — METOCLOPRAMIDE 10 MG: 5 INJECTION, SOLUTION INTRAMUSCULAR; INTRAVENOUS at 04:54

## 2022-01-01 RX ADMIN — ACETAMINOPHEN 1000 MG: 500 TABLET ORAL at 12:58

## 2022-01-01 RX ADMIN — ACETAMINOPHEN 1000 MG: 500 TABLET ORAL at 14:04

## 2022-01-01 RX ADMIN — FOLIC ACID 1 MG: 1 TABLET ORAL at 08:54

## 2022-01-01 RX ADMIN — ACETAMINOPHEN 1000 MG: 500 TABLET ORAL at 21:37

## 2022-01-01 RX ADMIN — QUETIAPINE FUMARATE 50 MG: 25 TABLET ORAL at 20:28

## 2022-01-01 RX ADMIN — MIDAZOLAM HYDROCHLORIDE 2 MG: 1 INJECTION, SOLUTION INTRAMUSCULAR; INTRAVENOUS at 20:06

## 2022-01-01 RX ADMIN — ATORVASTATIN CALCIUM 20 MG: 20 TABLET, FILM COATED ORAL at 20:28

## 2022-01-01 RX ADMIN — THIAMINE HYDROCHLORIDE 500 MG: 100 INJECTION, SOLUTION INTRAMUSCULAR; INTRAVENOUS at 04:11

## 2022-01-01 RX ADMIN — Medication 5 MG/HR: at 11:14

## 2022-01-01 RX ADMIN — ONDANSETRON 4 MG: 2 INJECTION INTRAMUSCULAR; INTRAVENOUS at 10:43

## 2022-01-01 RX ADMIN — QUETIAPINE FUMARATE 100 MG: 100 TABLET ORAL at 08:09

## 2022-01-01 RX ADMIN — SODIUM CHLORIDE, POTASSIUM CHLORIDE, SODIUM LACTATE AND CALCIUM CHLORIDE 1000 ML: 600; 310; 30; 20 INJECTION, SOLUTION INTRAVENOUS at 23:37

## 2022-01-01 RX ADMIN — SODIUM CHLORIDE 80 ML: 9 INJECTION, SOLUTION INTRAVENOUS at 05:22

## 2022-01-01 RX ADMIN — FENTANYL CITRATE 75 MCG: 50 INJECTION, SOLUTION INTRAMUSCULAR; INTRAVENOUS at 15:36

## 2022-01-01 RX ADMIN — OXYCODONE HYDROCHLORIDE 5 MG: 5 TABLET ORAL at 09:01

## 2022-01-01 RX ADMIN — QUETIAPINE FUMARATE 50 MG: 25 TABLET ORAL at 01:37

## 2022-01-01 RX ADMIN — POTASSIUM CHLORIDE 40 MEQ: 29.8 INJECTION, SOLUTION INTRAVENOUS at 08:19

## 2022-01-01 RX ADMIN — OXYCODONE HYDROCHLORIDE 5 MG: 5 TABLET ORAL at 05:18

## 2022-01-01 RX ADMIN — DEXMEDETOMIDINE HYDROCHLORIDE 1 MCG/KG/HR: 4 INJECTION, SOLUTION INTRAVENOUS at 08:13

## 2022-01-01 RX ADMIN — ROCURONIUM BROMIDE 20 MG: 10 INJECTION INTRAVENOUS at 18:39

## 2022-01-01 RX ADMIN — DIAZEPAM 10 MG: 5 TABLET ORAL at 05:06

## 2022-01-01 RX ADMIN — POLYETHYLENE GLYCOL 3350 17 G: 17 POWDER, FOR SOLUTION ORAL at 08:54

## 2022-01-01 RX ADMIN — DIAZEPAM 10 MG: 5 TABLET ORAL at 17:23

## 2022-01-01 RX ADMIN — PROPOFOL 30 MCG/KG/MIN: 10 INJECTION, EMULSION INTRAVENOUS at 11:15

## 2022-01-01 RX ADMIN — POTASSIUM BICARBONATE 40 MEQ: 782 TABLET, EFFERVESCENT ORAL at 09:26

## 2022-01-01 RX ADMIN — METHOCARBAMOL TABLETS 750 MG: 750 TABLET, COATED ORAL at 20:00

## 2022-01-01 RX ADMIN — AMIODARONE HYDROCHLORIDE 150 MG: 50 INJECTION, SOLUTION INTRAVENOUS at 11:37

## 2022-01-01 RX ADMIN — Medication 75 MCG/HR: at 17:50

## 2022-01-01 RX ADMIN — DIBASIC SODIUM PHOSPHATE, MONOBASIC POTASSIUM PHOSPHATE AND MONOBASIC SODIUM PHOSPHATE 2 TABLET: 852; 155; 130 TABLET ORAL at 20:28

## 2022-01-01 RX ADMIN — DEXMEDETOMIDINE HYDROCHLORIDE 1.5 MCG/KG/HR: 4 INJECTION, SOLUTION INTRAVENOUS at 15:57

## 2022-01-01 RX ADMIN — PROPOFOL 40 MCG/KG/MIN: 10 INJECTION, EMULSION INTRAVENOUS at 06:07

## 2022-01-01 RX ADMIN — DIAZEPAM 10 MG: 5 TABLET ORAL at 06:05

## 2022-01-01 RX ADMIN — SODIUM CHLORIDE, PRESERVATIVE FREE 20 MG: 5 INJECTION INTRAVENOUS at 09:47

## 2022-01-01 RX ADMIN — Medication 25 MCG/HR: at 10:28

## 2022-01-01 RX ADMIN — DIAZEPAM 10 MG: 5 TABLET ORAL at 17:41

## 2022-01-01 RX ADMIN — METOCLOPRAMIDE 10 MG: 5 INJECTION, SOLUTION INTRAMUSCULAR; INTRAVENOUS at 17:07

## 2022-01-01 RX ADMIN — SODIUM CHLORIDE, PRESERVATIVE FREE 20 MG: 5 INJECTION INTRAVENOUS at 07:53

## 2022-01-01 RX ADMIN — DEXMEDETOMIDINE HYDROCHLORIDE 1 MCG/KG/HR: 4 INJECTION, SOLUTION INTRAVENOUS at 12:04

## 2022-01-01 RX ADMIN — CHLORHEXIDINE GLUCONATE 15 ML: 1.2 RINSE ORAL at 09:08

## 2022-01-01 RX ADMIN — QUETIAPINE FUMARATE 50 MG: 25 TABLET ORAL at 21:38

## 2022-01-01 RX ADMIN — MAGNESIUM SULFATE 2000 MG: 2 INJECTION INTRAVENOUS at 08:08

## 2022-01-01 RX ADMIN — ACETAMINOPHEN 1000 MG: 500 TABLET ORAL at 05:18

## 2022-01-01 RX ADMIN — DIAZEPAM 10 MG: 5 TABLET ORAL at 11:20

## 2022-01-01 RX ADMIN — CHLORHEXIDINE GLUCONATE 0.12% ORAL RINSE 15 ML: 1.2 LIQUID ORAL at 09:58

## 2022-01-01 RX ADMIN — HYDRALAZINE HYDROCHLORIDE 10 MG: 20 INJECTION INTRAMUSCULAR; INTRAVENOUS at 01:35

## 2022-01-01 RX ADMIN — IOPAMIDOL 75 ML: 755 INJECTION, SOLUTION INTRAVENOUS at 05:21

## 2022-01-01 RX ADMIN — OXYCODONE HYDROCHLORIDE 5 MG: 5 TABLET ORAL at 02:34

## 2022-01-01 RX ADMIN — OXYCODONE HYDROCHLORIDE 5 MG: 5 TABLET ORAL at 02:28

## 2022-01-01 RX ADMIN — GABAPENTIN 300 MG: 300 CAPSULE ORAL at 00:45

## 2022-01-01 RX ADMIN — FENTANYL CITRATE 50 MCG: 50 INJECTION, SOLUTION INTRAMUSCULAR; INTRAVENOUS at 08:21

## 2022-01-01 RX ADMIN — CHLORHEXIDINE GLUCONATE 15 ML: 1.2 RINSE ORAL at 08:22

## 2022-01-01 RX ADMIN — DEXMEDETOMIDINE HYDROCHLORIDE 0.5 MCG/KG/HR: 4 INJECTION, SOLUTION INTRAVENOUS at 12:53

## 2022-01-01 RX ADMIN — MAGNESIUM SULFATE 2000 MG: 2 INJECTION INTRAVENOUS at 06:06

## 2022-01-01 RX ADMIN — DIAZEPAM 10 MG: 5 TABLET ORAL at 21:00

## 2022-01-01 RX ADMIN — ATORVASTATIN CALCIUM 20 MG: 20 TABLET, FILM COATED ORAL at 20:56

## 2022-01-01 RX ADMIN — ROCURONIUM BROMIDE 50 MG: 10 INJECTION INTRAVENOUS at 15:34

## 2022-01-01 RX ADMIN — HEPARIN SODIUM 4730 UNITS: 1000 INJECTION INTRAVENOUS; SUBCUTANEOUS at 10:28

## 2022-01-01 RX ADMIN — FENTANYL CITRATE 50 MCG: 50 INJECTION, SOLUTION INTRAMUSCULAR; INTRAVENOUS at 18:58

## 2022-01-01 RX ADMIN — DEXMEDETOMIDINE HYDROCHLORIDE 1.3 MCG/KG/HR: 4 INJECTION, SOLUTION INTRAVENOUS at 18:06

## 2022-01-01 RX ADMIN — SUGAMMADEX 300 MG: 100 INJECTION, SOLUTION INTRAVENOUS at 20:29

## 2022-01-01 RX ADMIN — FAMOTIDINE 20 MG: 20 TABLET, FILM COATED ORAL at 19:48

## 2022-01-01 RX ADMIN — FOLIC ACID 1 MG: 1 TABLET ORAL at 09:21

## 2022-01-01 RX ADMIN — LORAZEPAM 2 MG: 2 INJECTION INTRAMUSCULAR at 13:00

## 2022-01-01 RX ADMIN — SODIUM CHLORIDE, POTASSIUM CHLORIDE, SODIUM LACTATE AND CALCIUM CHLORIDE: 600; 310; 30; 20 INJECTION, SOLUTION INTRAVENOUS at 06:24

## 2022-01-01 RX ADMIN — THIAMINE HYDROCHLORIDE 500 MG: 100 INJECTION, SOLUTION INTRAMUSCULAR; INTRAVENOUS at 12:14

## 2022-01-01 RX ADMIN — METOCLOPRAMIDE 10 MG: 5 INJECTION, SOLUTION INTRAMUSCULAR; INTRAVENOUS at 04:55

## 2022-01-01 RX ADMIN — THIAMINE HYDROCHLORIDE 100 MG: 100 INJECTION, SOLUTION INTRAMUSCULAR; INTRAVENOUS at 09:55

## 2022-01-01 RX ADMIN — ACETAMINOPHEN 1000 MG: 500 TABLET ORAL at 20:58

## 2022-01-01 RX ADMIN — METOCLOPRAMIDE 10 MG: 5 INJECTION, SOLUTION INTRAMUSCULAR; INTRAVENOUS at 21:55

## 2022-01-01 RX ADMIN — DIAZEPAM 10 MG: 5 TABLET ORAL at 18:31

## 2022-01-01 RX ADMIN — Medication 50 MCG: at 19:05

## 2022-01-01 RX ADMIN — POTASSIUM PHOSPHATE, MONOBASIC AND POTASSIUM PHOSPHATE, DIBASIC 10 MMOL: 224; 236 INJECTION, SOLUTION, CONCENTRATE INTRAVENOUS at 08:50

## 2022-01-01 RX ADMIN — ENOXAPARIN SODIUM 30 MG: 100 INJECTION SUBCUTANEOUS at 21:36

## 2022-01-01 RX ADMIN — PROPOFOL 40 MCG/KG/MIN: 10 INJECTION, EMULSION INTRAVENOUS at 17:23

## 2022-01-01 RX ADMIN — FUROSEMIDE 40 MG: 10 INJECTION, SOLUTION INTRAMUSCULAR; INTRAVENOUS at 18:18

## 2022-01-01 RX ADMIN — DIBASIC SODIUM PHOSPHATE, MONOBASIC POTASSIUM PHOSPHATE AND MONOBASIC SODIUM PHOSPHATE 2 TABLET: 852; 155; 130 TABLET ORAL at 21:55

## 2022-01-01 RX ADMIN — THIAMINE HYDROCHLORIDE 500 MG: 100 INJECTION, SOLUTION INTRAMUSCULAR; INTRAVENOUS at 04:57

## 2022-01-01 RX ADMIN — IBUPROFEN 400 MG: 400 TABLET, FILM COATED ORAL at 12:36

## 2022-01-01 RX ADMIN — ACETAMINOPHEN 1000 MG: 500 TABLET ORAL at 06:07

## 2022-01-01 RX ADMIN — DEXMEDETOMIDINE HYDROCHLORIDE 1 MCG/KG/HR: 4 INJECTION, SOLUTION INTRAVENOUS at 20:25

## 2022-01-01 RX ADMIN — FAMOTIDINE 20 MG: 20 TABLET, FILM COATED ORAL at 20:58

## 2022-01-01 RX ADMIN — GABAPENTIN 300 MG: 300 CAPSULE ORAL at 10:03

## 2022-01-01 RX ADMIN — Medication 30 MCG/MIN: at 06:19

## 2022-01-01 RX ADMIN — CHLORHEXIDINE GLUCONATE 0.12% ORAL RINSE 15 ML: 1.2 LIQUID ORAL at 08:01

## 2022-01-01 RX ADMIN — SODIUM CHLORIDE 1000 ML: 9 INJECTION, SOLUTION INTRAVENOUS at 05:29

## 2022-01-01 RX ADMIN — ENOXAPARIN SODIUM 30 MG: 100 INJECTION SUBCUTANEOUS at 10:03

## 2022-01-01 RX ADMIN — LORAZEPAM 2 MG: 2 INJECTION INTRAMUSCULAR; INTRAVENOUS at 20:54

## 2022-01-01 RX ADMIN — DIBASIC SODIUM PHOSPHATE, MONOBASIC POTASSIUM PHOSPHATE AND MONOBASIC SODIUM PHOSPHATE 2 TABLET: 852; 155; 130 TABLET ORAL at 08:01

## 2022-01-01 RX ADMIN — FAMOTIDINE 20 MG: 20 TABLET, FILM COATED ORAL at 08:08

## 2022-01-01 RX ADMIN — ATORVASTATIN CALCIUM 20 MG: 20 TABLET, FILM COATED ORAL at 20:00

## 2022-01-01 RX ADMIN — QUETIAPINE FUMARATE 50 MG: 25 TABLET ORAL at 13:32

## 2022-01-01 RX ADMIN — FENTANYL CITRATE 50 MCG: 50 INJECTION, SOLUTION INTRAMUSCULAR; INTRAVENOUS at 21:18

## 2022-01-01 RX ADMIN — DEXMEDETOMIDINE HYDROCHLORIDE 1.3 MCG/KG/HR: 4 INJECTION, SOLUTION INTRAVENOUS at 23:55

## 2022-01-01 RX ADMIN — HEPARIN SODIUM 8.45 UNITS/KG/HR: 5000 INJECTION, SOLUTION INTRAVENOUS; SUBCUTANEOUS at 10:15

## 2022-01-01 RX ADMIN — LORAZEPAM 2 MG: 2 INJECTION INTRAMUSCULAR at 12:45

## 2022-01-01 RX ADMIN — DEXMEDETOMIDINE HYDROCHLORIDE 1.5 MCG/KG/HR: 4 INJECTION, SOLUTION INTRAVENOUS at 22:12

## 2022-01-01 RX ADMIN — ATORVASTATIN CALCIUM 20 MG: 20 TABLET, FILM COATED ORAL at 21:55

## 2022-01-01 RX ADMIN — QUETIAPINE FUMARATE 50 MG: 25 TABLET ORAL at 08:07

## 2022-01-01 RX ADMIN — METHOCARBAMOL TABLETS 750 MG: 750 TABLET, COATED ORAL at 04:18

## 2022-01-01 RX ADMIN — QUETIAPINE FUMARATE 150 MG: 100 TABLET ORAL at 20:02

## 2022-01-01 RX ADMIN — Medication 100 MCG/HR: at 19:55

## 2022-01-01 RX ADMIN — QUETIAPINE FUMARATE 50 MG: 25 TABLET ORAL at 08:54

## 2022-01-01 RX ADMIN — DEXMEDETOMIDINE HYDROCHLORIDE 1.5 MCG/KG/HR: 4 INJECTION, SOLUTION INTRAVENOUS at 10:29

## 2022-01-01 RX ADMIN — FENTANYL CITRATE 50 MCG: 50 INJECTION, SOLUTION INTRAMUSCULAR; INTRAVENOUS at 16:19

## 2022-01-01 RX ADMIN — FUROSEMIDE 40 MG: 10 INJECTION, SOLUTION INTRAMUSCULAR; INTRAVENOUS at 08:11

## 2022-01-01 RX ADMIN — ACETAMINOPHEN 1000 MG: 500 TABLET ORAL at 12:50

## 2022-01-01 RX ADMIN — Medication 5 MG/HR: at 11:19

## 2022-01-01 RX ADMIN — DEXMEDETOMIDINE HYDROCHLORIDE 0.2 MCG/KG/HR: 4 INJECTION, SOLUTION INTRAVENOUS at 21:08

## 2022-01-01 RX ADMIN — METOPROLOL TARTRATE 1 MG: 1 INJECTION, SOLUTION INTRAVENOUS at 16:40

## 2022-01-01 RX ADMIN — Medication 2 G: at 16:09

## 2022-01-01 RX ADMIN — FENTANYL CITRATE 50 MCG: 50 INJECTION, SOLUTION INTRAMUSCULAR; INTRAVENOUS at 01:30

## 2022-01-01 RX ADMIN — DIBASIC SODIUM PHOSPHATE, MONOBASIC POTASSIUM PHOSPHATE AND MONOBASIC SODIUM PHOSPHATE 2 TABLET: 852; 155; 130 TABLET ORAL at 08:54

## 2022-01-01 RX ADMIN — ACETAMINOPHEN 1000 MG: 500 TABLET ORAL at 12:51

## 2022-01-01 RX ADMIN — IPRATROPIUM BROMIDE AND ALBUTEROL SULFATE 1 AMPULE: .5; 2.5 SOLUTION RESPIRATORY (INHALATION) at 21:34

## 2022-01-01 RX ADMIN — LEVETIRACETAM 500 MG: 5 INJECTION INTRAVENOUS at 05:23

## 2022-01-01 RX ADMIN — DEXMEDETOMIDINE HYDROCHLORIDE 0.18 MCG/KG/HR: 4 INJECTION, SOLUTION INTRAVENOUS at 22:06

## 2022-01-01 RX ADMIN — ACETAMINOPHEN 1000 MG: 500 TABLET ORAL at 14:06

## 2022-01-01 RX ADMIN — CHLORHEXIDINE GLUCONATE 0.12% ORAL RINSE 15 ML: 1.2 LIQUID ORAL at 20:37

## 2022-01-01 RX ADMIN — TRAZODONE HYDROCHLORIDE 100 MG: 100 TABLET ORAL at 20:57

## 2022-01-01 RX ADMIN — POTASSIUM BICARBONATE 40 MEQ: 782 TABLET, EFFERVESCENT ORAL at 08:01

## 2022-01-01 RX ADMIN — FAMOTIDINE 20 MG: 20 TABLET, FILM COATED ORAL at 10:04

## 2022-01-01 RX ADMIN — FAMOTIDINE 20 MG: 10 INJECTION INTRAVENOUS at 09:07

## 2022-01-01 RX ADMIN — SODIUM CHLORIDE, PRESERVATIVE FREE 10 ML: 5 INJECTION INTRAVENOUS at 05:22

## 2022-01-01 RX ADMIN — FUROSEMIDE 40 MG: 10 INJECTION, SOLUTION INTRAMUSCULAR; INTRAVENOUS at 06:55

## 2022-01-01 RX ADMIN — AMIODARONE HYDROCHLORIDE 0.5 MG/MIN: 50 INJECTION, SOLUTION INTRAVENOUS at 08:59

## 2022-01-01 RX ADMIN — ACETAMINOPHEN 1000 MG: 500 TABLET ORAL at 05:20

## 2022-01-01 RX ADMIN — FENTANYL CITRATE 50 MCG: 50 INJECTION, SOLUTION INTRAMUSCULAR; INTRAVENOUS at 11:36

## 2022-01-01 RX ADMIN — OXYCODONE HYDROCHLORIDE 5 MG: 5 TABLET ORAL at 02:45

## 2022-01-01 RX ADMIN — FOLIC ACID 1 MG: 1 TABLET ORAL at 08:11

## 2022-01-01 RX ADMIN — HEPARIN SODIUM 4730 UNITS: 1000 INJECTION INTRAVENOUS; SUBCUTANEOUS at 22:24

## 2022-01-01 RX ADMIN — POTASSIUM BICARBONATE 40 MEQ: 782 TABLET, EFFERVESCENT ORAL at 08:07

## 2022-01-01 RX ADMIN — DIAZEPAM 10 MG: 5 TABLET ORAL at 05:11

## 2022-01-01 RX ADMIN — METHOCARBAMOL TABLETS 750 MG: 750 TABLET, COATED ORAL at 20:57

## 2022-01-01 RX ADMIN — TRAZODONE HYDROCHLORIDE 100 MG: 100 TABLET ORAL at 19:51

## 2022-01-01 RX ADMIN — ACETAMINOPHEN 1000 MG: 500 TABLET ORAL at 20:56

## 2022-01-01 RX ADMIN — ATORVASTATIN CALCIUM 20 MG: 20 TABLET, FILM COATED ORAL at 22:23

## 2022-01-01 RX ADMIN — SODIUM CHLORIDE, POTASSIUM CHLORIDE, SODIUM LACTATE AND CALCIUM CHLORIDE: 600; 310; 30; 20 INJECTION, SOLUTION INTRAVENOUS at 15:21

## 2022-01-01 RX ADMIN — SODIUM CHLORIDE, POTASSIUM CHLORIDE, SODIUM LACTATE AND CALCIUM CHLORIDE 1000 ML: 600; 310; 30; 20 INJECTION, SOLUTION INTRAVENOUS at 21:08

## 2022-01-01 RX ADMIN — DIAZEPAM 10 MG: 5 TABLET ORAL at 23:53

## 2022-01-01 RX ADMIN — DEXMEDETOMIDINE HYDROCHLORIDE 1.3 MCG/KG/HR: 4 INJECTION, SOLUTION INTRAVENOUS at 21:40

## 2022-01-01 RX ADMIN — ACETAMINOPHEN 1000 MG: 500 TABLET ORAL at 04:32

## 2022-01-01 RX ADMIN — METHOCARBAMOL TABLETS 750 MG: 750 TABLET, COATED ORAL at 19:47

## 2022-01-01 RX ADMIN — POTASSIUM BICARBONATE 40 MEQ: 782 TABLET, EFFERVESCENT ORAL at 06:06

## 2022-01-01 RX ADMIN — DIBASIC SODIUM PHOSPHATE, MONOBASIC POTASSIUM PHOSPHATE AND MONOBASIC SODIUM PHOSPHATE 2 TABLET: 852; 155; 130 TABLET ORAL at 07:54

## 2022-01-01 RX ADMIN — POLYETHYLENE GLYCOL 3350 17 G: 17 POWDER, FOR SOLUTION ORAL at 07:50

## 2022-01-01 RX ADMIN — CHLORHEXIDINE GLUCONATE 0.12% ORAL RINSE 15 ML: 1.2 LIQUID ORAL at 21:00

## 2022-01-01 RX ADMIN — FENTANYL CITRATE 100 MCG: 50 INJECTION, SOLUTION INTRAMUSCULAR; INTRAVENOUS at 20:16

## 2022-01-01 RX ADMIN — SODIUM CHLORIDE, PRESERVATIVE FREE 20 MG: 5 INJECTION INTRAVENOUS at 07:44

## 2022-01-01 RX ADMIN — HYDRALAZINE HYDROCHLORIDE 10 MG: 20 INJECTION INTRAMUSCULAR; INTRAVENOUS at 00:18

## 2022-01-01 RX ADMIN — QUETIAPINE FUMARATE 100 MG: 100 TABLET ORAL at 20:57

## 2022-01-01 RX ADMIN — DIBASIC SODIUM PHOSPHATE, MONOBASIC POTASSIUM PHOSPHATE AND MONOBASIC SODIUM PHOSPHATE 2 TABLET: 852; 155; 130 TABLET ORAL at 08:10

## 2022-01-01 RX ADMIN — ACETAMINOPHEN 1000 MG: 500 TABLET ORAL at 05:38

## 2022-01-01 RX ADMIN — ACETAMINOPHEN 1000 MG: 500 TABLET ORAL at 21:30

## 2022-01-01 RX ADMIN — VANCOMYCIN HYDROCHLORIDE 2250 MG: 1 INJECTION, POWDER, LYOPHILIZED, FOR SOLUTION INTRAVENOUS at 00:14

## 2022-01-01 RX ADMIN — ACETAMINOPHEN 1000 MG: 500 TABLET ORAL at 20:57

## 2022-01-01 RX ADMIN — FUROSEMIDE 40 MG: 10 INJECTION, SOLUTION INTRAMUSCULAR; INTRAVENOUS at 15:19

## 2022-01-01 RX ADMIN — TRAZODONE HYDROCHLORIDE 100 MG: 100 TABLET ORAL at 20:02

## 2022-01-01 RX ADMIN — BENZOCAINE AND MENTHOL 1 LOZENGE: 15; 3.6 LOZENGE ORAL at 18:15

## 2022-01-01 RX ADMIN — DIAZEPAM 5 MG: 5 TABLET ORAL at 10:02

## 2022-01-01 RX ADMIN — DEXMEDETOMIDINE HYDROCHLORIDE 0.7 MCG/KG/HR: 4 INJECTION, SOLUTION INTRAVENOUS at 05:57

## 2022-01-01 RX ADMIN — LORAZEPAM 1 MG: 2 INJECTION INTRAMUSCULAR; INTRAVENOUS at 00:45

## 2022-01-01 RX ADMIN — GABAPENTIN 300 MG: 300 CAPSULE ORAL at 16:33

## 2022-01-01 RX ADMIN — ATORVASTATIN CALCIUM 20 MG: 20 TABLET, FILM COATED ORAL at 20:57

## 2022-01-01 RX ADMIN — DIAZEPAM 5 MG: 5 TABLET ORAL at 19:46

## 2022-01-01 RX ADMIN — MORPHINE SULFATE 4 MG: 4 INJECTION, SOLUTION INTRAMUSCULAR; INTRAVENOUS at 06:35

## 2022-01-01 RX ADMIN — QUETIAPINE FUMARATE 50 MG: 25 TABLET ORAL at 20:56

## 2022-01-01 RX ADMIN — Medication 50 MCG/HR: at 14:00

## 2022-01-01 RX ADMIN — PIPERACILLIN AND TAZOBACTAM 3375 MG: 3; .375 INJECTION, POWDER, LYOPHILIZED, FOR SOLUTION INTRAVENOUS at 21:19

## 2022-01-01 RX ADMIN — FENTANYL CITRATE 50 MCG: 50 INJECTION, SOLUTION INTRAMUSCULAR; INTRAVENOUS at 08:08

## 2022-01-01 RX ADMIN — DIAZEPAM 10 MG: 5 TABLET ORAL at 17:44

## 2022-01-01 RX ADMIN — METOCLOPRAMIDE 10 MG: 5 INJECTION, SOLUTION INTRAMUSCULAR; INTRAVENOUS at 00:13

## 2022-01-01 RX ADMIN — KETAMINE HYDROCHLORIDE 25 MG: 10 INJECTION INTRAMUSCULAR; INTRAVENOUS at 17:48

## 2022-01-01 RX ADMIN — QUETIAPINE FUMARATE 100 MG: 25 TABLET ORAL at 19:50

## 2022-01-01 RX ADMIN — ENOXAPARIN SODIUM 30 MG: 100 INJECTION SUBCUTANEOUS at 09:57

## 2022-01-01 RX ADMIN — DIAZEPAM 10 MG: 5 TABLET ORAL at 00:45

## 2022-01-01 RX ADMIN — OXYCODONE HYDROCHLORIDE 5 MG: 5 TABLET ORAL at 22:26

## 2022-01-01 RX ADMIN — DIBASIC SODIUM PHOSPHATE, MONOBASIC POTASSIUM PHOSPHATE AND MONOBASIC SODIUM PHOSPHATE 2 TABLET: 852; 155; 130 TABLET ORAL at 19:49

## 2022-01-01 RX ADMIN — SODIUM CHLORIDE, PRESERVATIVE FREE 20 MG: 5 INJECTION INTRAVENOUS at 22:21

## 2022-01-01 RX ADMIN — AMIODARONE HYDROCHLORIDE 0.5 MG/MIN: 50 INJECTION, SOLUTION INTRAVENOUS at 16:00

## 2022-01-01 RX ADMIN — FAMOTIDINE 20 MG: 10 INJECTION INTRAVENOUS at 08:21

## 2022-01-01 RX ADMIN — KETAMINE HYDROCHLORIDE 50 MG: 10 INJECTION INTRAMUSCULAR; INTRAVENOUS at 16:23

## 2022-01-01 RX ADMIN — ACETAMINOPHEN 1000 MG: 500 TABLET ORAL at 12:55

## 2022-01-01 RX ADMIN — ACETAMINOPHEN 1000 MG: 500 TABLET ORAL at 05:07

## 2022-01-01 RX ADMIN — KETAMINE HYDROCHLORIDE 25 MG: 10 INJECTION INTRAMUSCULAR; INTRAVENOUS at 19:00

## 2022-01-01 RX ADMIN — METHOCARBAMOL TABLETS 750 MG: 750 TABLET, COATED ORAL at 10:03

## 2022-01-01 RX ADMIN — ACETAMINOPHEN 1000 MG: 500 TABLET ORAL at 12:56

## 2022-01-01 RX ADMIN — DEXMEDETOMIDINE HYDROCHLORIDE 1 MCG/KG/HR: 4 INJECTION, SOLUTION INTRAVENOUS at 16:09

## 2022-01-01 RX ADMIN — ENOXAPARIN SODIUM 30 MG: 100 INJECTION SUBCUTANEOUS at 08:54

## 2022-01-01 RX ADMIN — METOPROLOL TARTRATE 1 MG: 1 INJECTION, SOLUTION INTRAVENOUS at 16:37

## 2022-01-01 RX ADMIN — CHLORHEXIDINE GLUCONATE 0.12% ORAL RINSE 15 ML: 1.2 LIQUID ORAL at 08:54

## 2022-01-01 RX ADMIN — PROPOFOL 40 MCG: 10 INJECTION, EMULSION INTRAVENOUS at 21:57

## 2022-01-01 RX ADMIN — SODIUM CHLORIDE, PRESERVATIVE FREE 20 MG: 5 INJECTION INTRAVENOUS at 20:35

## 2022-01-01 RX ADMIN — METOCLOPRAMIDE 10 MG: 5 INJECTION, SOLUTION INTRAMUSCULAR; INTRAVENOUS at 11:20

## 2022-01-01 RX ADMIN — ACETAMINOPHEN 1000 MG: 500 TABLET ORAL at 22:26

## 2022-01-01 RX ADMIN — DIBASIC SODIUM PHOSPHATE, MONOBASIC POTASSIUM PHOSPHATE AND MONOBASIC SODIUM PHOSPHATE 2 TABLET: 852; 155; 130 TABLET ORAL at 08:08

## 2022-01-01 RX ADMIN — DIGOXIN 250 MCG: 0.25 INJECTION INTRAMUSCULAR; INTRAVENOUS at 10:20

## 2022-01-01 RX ADMIN — SODIUM CHLORIDE, POTASSIUM CHLORIDE, SODIUM LACTATE AND CALCIUM CHLORIDE 1000 ML: 600; 310; 30; 20 INJECTION, SOLUTION INTRAVENOUS at 12:59

## 2022-01-01 RX ADMIN — ATORVASTATIN CALCIUM 20 MG: 20 TABLET, FILM COATED ORAL at 22:02

## 2022-01-01 RX ADMIN — QUETIAPINE FUMARATE 50 MG: 25 TABLET ORAL at 15:14

## 2022-01-01 RX ADMIN — FENTANYL CITRATE 50 MCG: 50 INJECTION, SOLUTION INTRAMUSCULAR; INTRAVENOUS at 09:58

## 2022-01-01 RX ADMIN — METHOCARBAMOL TABLETS 750 MG: 750 TABLET, COATED ORAL at 03:10

## 2022-01-01 RX ADMIN — DIAZEPAM 10 MG: 5 TABLET ORAL at 12:13

## 2022-01-01 RX ADMIN — SODIUM CHLORIDE, POTASSIUM CHLORIDE, SODIUM LACTATE AND CALCIUM CHLORIDE 1000 ML: 600; 310; 30; 20 INJECTION, SOLUTION INTRAVENOUS at 08:45

## 2022-01-01 RX ADMIN — DIAZEPAM 10 MG: 5 TABLET ORAL at 08:07

## 2022-01-01 RX ADMIN — THIAMINE HYDROCHLORIDE 500 MG: 100 INJECTION, SOLUTION INTRAMUSCULAR; INTRAVENOUS at 17:14

## 2022-01-01 RX ADMIN — METHOCARBAMOL TABLETS 750 MG: 750 TABLET, COATED ORAL at 10:01

## 2022-01-01 RX ADMIN — FENTANYL CITRATE 50 MCG: 50 INJECTION, SOLUTION INTRAMUSCULAR; INTRAVENOUS at 23:39

## 2022-01-01 RX ADMIN — GABAPENTIN 300 MG: 300 CAPSULE ORAL at 10:01

## 2022-01-01 RX ADMIN — OXYCODONE HYDROCHLORIDE 5 MG: 5 TABLET ORAL at 12:22

## 2022-01-01 RX ADMIN — HEPARIN SODIUM 12.45 UNITS/KG/HR: 5000 INJECTION, SOLUTION INTRAVENOUS; SUBCUTANEOUS at 22:18

## 2022-01-01 RX ADMIN — DEXMEDETOMIDINE HYDROCHLORIDE 2 MCG/KG/HR: 4 INJECTION, SOLUTION INTRAVENOUS at 00:41

## 2022-01-01 RX ADMIN — OXYCODONE HYDROCHLORIDE 5 MG: 5 TABLET ORAL at 14:42

## 2022-01-01 RX ADMIN — DEXMEDETOMIDINE HYDROCHLORIDE 1.2 MCG/KG/HR: 4 INJECTION, SOLUTION INTRAVENOUS at 09:14

## 2022-01-01 RX ADMIN — FENTANYL CITRATE 25 MCG: 50 INJECTION, SOLUTION INTRAMUSCULAR; INTRAVENOUS at 15:58

## 2022-01-01 RX ADMIN — OXYCODONE HYDROCHLORIDE 5 MG: 5 TABLET ORAL at 10:02

## 2022-01-01 RX ADMIN — MORPHINE SULFATE 4 MG: 4 INJECTION, SOLUTION INTRAMUSCULAR; INTRAVENOUS at 13:00

## 2022-01-01 RX ADMIN — PROPOFOL 50 MCG/KG/MIN: 10 INJECTION, EMULSION INTRAVENOUS at 23:20

## 2022-01-01 RX ADMIN — INSULIN LISPRO 9 UNITS: 100 INJECTION, SOLUTION INTRAVENOUS; SUBCUTANEOUS at 12:07

## 2022-01-01 RX ADMIN — Medication 50 MCG/HR: at 10:08

## 2022-01-01 RX ADMIN — DIBASIC SODIUM PHOSPHATE, MONOBASIC POTASSIUM PHOSPHATE AND MONOBASIC SODIUM PHOSPHATE 2 TABLET: 852; 155; 130 TABLET ORAL at 22:02

## 2022-01-01 RX ADMIN — Medication 50 MCG/HR: at 17:33

## 2022-01-01 RX ADMIN — SODIUM CHLORIDE, PRESERVATIVE FREE 20 MG: 5 INJECTION INTRAVENOUS at 08:54

## 2022-01-01 RX ADMIN — HEPARIN SODIUM 4730 UNITS: 1000 INJECTION INTRAVENOUS; SUBCUTANEOUS at 17:21

## 2022-01-01 RX ADMIN — GABAPENTIN 300 MG: 300 CAPSULE ORAL at 00:01

## 2022-01-01 RX ADMIN — FAMOTIDINE 20 MG: 20 TABLET, FILM COATED ORAL at 20:57

## 2022-01-01 RX ADMIN — DEXMEDETOMIDINE HYDROCHLORIDE 0.8 MCG/KG/HR: 4 INJECTION, SOLUTION INTRAVENOUS at 05:04

## 2022-01-01 RX ADMIN — THIAMINE HYDROCHLORIDE 500 MG: 100 INJECTION, SOLUTION INTRAMUSCULAR; INTRAVENOUS at 04:52

## 2022-01-01 RX ADMIN — FOLIC ACID 1 MG: 1 TABLET ORAL at 07:54

## 2022-01-01 RX ADMIN — ACETAMINOPHEN 1000 MG: 500 TABLET ORAL at 12:12

## 2022-01-01 RX ADMIN — POLYETHYLENE GLYCOL 3350 17 G: 17 POWDER, FOR SOLUTION ORAL at 09:08

## 2022-01-01 RX ADMIN — FOLIC ACID 1 MG: 1 TABLET ORAL at 09:07

## 2022-01-01 RX ADMIN — DEXMEDETOMIDINE HYDROCHLORIDE 1.5 MCG/KG/HR: 4 INJECTION, SOLUTION INTRAVENOUS at 07:54

## 2022-01-01 RX ADMIN — FUROSEMIDE 40 MG: 10 INJECTION, SOLUTION INTRAMUSCULAR; INTRAVENOUS at 06:41

## 2022-01-01 RX ADMIN — GABAPENTIN 300 MG: 300 CAPSULE ORAL at 16:29

## 2022-01-01 RX ADMIN — GLYCOPYRROLATE 0.2 MG: 0.2 INJECTION INTRAMUSCULAR; INTRAVENOUS at 16:28

## 2022-01-01 RX ADMIN — DIAZEPAM 10 MG: 5 TABLET ORAL at 00:29

## 2022-01-01 RX ADMIN — Medication 100 MCG/HR: at 06:33

## 2022-01-01 RX ADMIN — CHLORHEXIDINE GLUCONATE 15 ML: 1.2 RINSE ORAL at 21:28

## 2022-01-01 RX ADMIN — Medication 50 MCG: at 17:59

## 2022-01-01 RX ADMIN — DIAZEPAM 10 MG: 5 TABLET ORAL at 04:18

## 2022-01-01 RX ADMIN — POLYETHYLENE GLYCOL 3350 17 G: 17 POWDER, FOR SOLUTION ORAL at 08:07

## 2022-01-01 RX ADMIN — SODIUM CHLORIDE, PRESERVATIVE FREE 20 MG: 5 INJECTION INTRAVENOUS at 00:44

## 2022-01-01 RX ADMIN — FENTANYL CITRATE 100 MCG: 50 INJECTION, SOLUTION INTRAMUSCULAR; INTRAVENOUS at 09:29

## 2022-01-01 RX ADMIN — SODIUM CHLORIDE, POTASSIUM CHLORIDE, SODIUM LACTATE AND CALCIUM CHLORIDE: 600; 310; 30; 20 INJECTION, SOLUTION INTRAVENOUS at 10:00

## 2022-01-01 RX ADMIN — FENTANYL CITRATE 50 MCG: 50 INJECTION, SOLUTION INTRAMUSCULAR; INTRAVENOUS at 04:02

## 2022-01-01 RX ADMIN — DIBASIC SODIUM PHOSPHATE, MONOBASIC POTASSIUM PHOSPHATE AND MONOBASIC SODIUM PHOSPHATE 2 TABLET: 852; 155; 130 TABLET ORAL at 21:38

## 2022-01-01 RX ADMIN — SODIUM CHLORIDE, POTASSIUM CHLORIDE, SODIUM LACTATE AND CALCIUM CHLORIDE: 600; 310; 30; 20 INJECTION, SOLUTION INTRAVENOUS at 07:53

## 2022-01-01 RX ADMIN — SODIUM CHLORIDE, POTASSIUM CHLORIDE, SODIUM LACTATE AND CALCIUM CHLORIDE: 600; 310; 30; 20 INJECTION, SOLUTION INTRAVENOUS at 00:36

## 2022-01-01 RX ADMIN — DEXMEDETOMIDINE HYDROCHLORIDE 0.6 MCG/KG/HR: 4 INJECTION, SOLUTION INTRAVENOUS at 10:43

## 2022-01-01 RX ADMIN — METHOCARBAMOL TABLETS 750 MG: 750 TABLET, COATED ORAL at 16:32

## 2022-01-01 RX ADMIN — IOPAMIDOL 85 ML: 755 INJECTION, SOLUTION INTRAVENOUS at 15:33

## 2022-01-01 RX ADMIN — AMIODARONE HYDROCHLORIDE: 150 INJECTION, SOLUTION INTRAVENOUS at 12:02

## 2022-01-01 RX ADMIN — METOCLOPRAMIDE 10 MG: 5 INJECTION, SOLUTION INTRAMUSCULAR; INTRAVENOUS at 15:14

## 2022-01-01 RX ADMIN — METOCLOPRAMIDE 10 MG: 5 INJECTION, SOLUTION INTRAMUSCULAR; INTRAVENOUS at 21:37

## 2022-01-01 RX ADMIN — ENOXAPARIN SODIUM 30 MG: 100 INJECTION SUBCUTANEOUS at 20:57

## 2022-01-01 RX ADMIN — FOLIC ACID 1 MG: 1 TABLET ORAL at 08:01

## 2022-01-01 RX ADMIN — FENTANYL CITRATE 50 MCG: 50 INJECTION, SOLUTION INTRAMUSCULAR; INTRAVENOUS at 02:02

## 2022-01-01 RX ADMIN — ENOXAPARIN SODIUM 30 MG: 100 INJECTION SUBCUTANEOUS at 22:20

## 2022-01-01 RX ADMIN — ALBUTEROL SULFATE 2 PUFF: 90 AEROSOL, METERED RESPIRATORY (INHALATION) at 16:44

## 2022-01-01 RX ADMIN — PROPOFOL 30 MCG/KG/MIN: 10 INJECTION, EMULSION INTRAVENOUS at 12:52

## 2022-01-01 RX ADMIN — HEPARIN SODIUM 30000 UNITS: 1000 INJECTION INTRAVENOUS; SUBCUTANEOUS at 13:07

## 2022-01-01 RX ADMIN — DEXMEDETOMIDINE HYDROCHLORIDE 1.5 MCG/KG/HR: 4 INJECTION, SOLUTION INTRAVENOUS at 13:17

## 2022-01-01 RX ADMIN — DEXTROSE AND SODIUM CHLORIDE: 5; 900 INJECTION, SOLUTION INTRAVENOUS at 05:29

## 2022-01-01 RX ADMIN — DIBASIC SODIUM PHOSPHATE, MONOBASIC POTASSIUM PHOSPHATE AND MONOBASIC SODIUM PHOSPHATE 2 TABLET: 852; 155; 130 TABLET ORAL at 09:21

## 2022-01-01 RX ADMIN — ACETAMINOPHEN 1000 MG: 500 TABLET ORAL at 06:05

## 2022-01-01 RX ADMIN — DEXMEDETOMIDINE HYDROCHLORIDE 1.1 MCG/KG/HR: 4 INJECTION, SOLUTION INTRAVENOUS at 00:05

## 2022-01-01 RX ADMIN — OXYCODONE HYDROCHLORIDE 5 MG: 5 TABLET ORAL at 01:54

## 2022-01-01 RX ADMIN — QUETIAPINE FUMARATE 50 MG: 25 TABLET ORAL at 14:42

## 2022-01-01 RX ADMIN — DIAZEPAM 10 MG: 5 TABLET ORAL at 03:15

## 2022-01-01 RX ADMIN — MAGNESIUM SULFATE IN WATER 4000 MG: 40 INJECTION, SOLUTION INTRAVENOUS at 22:55

## 2022-01-01 RX ADMIN — CEFAZOLIN 2000 MG: 330 INJECTION, POWDER, FOR SOLUTION INTRAMUSCULAR; INTRAVENOUS at 20:04

## 2022-01-01 RX ADMIN — OXYCODONE HYDROCHLORIDE 5 MG: 5 TABLET ORAL at 11:36

## 2022-01-01 RX ADMIN — DEXMEDETOMIDINE HYDROCHLORIDE 0.7 MCG/KG/HR: 4 INJECTION, SOLUTION INTRAVENOUS at 19:35

## 2022-01-01 RX ADMIN — SODIUM CHLORIDE, POTASSIUM CHLORIDE, SODIUM LACTATE AND CALCIUM CHLORIDE 500 ML: 600; 310; 30; 20 INJECTION, SOLUTION INTRAVENOUS at 00:10

## 2022-01-01 RX ADMIN — DIAZEPAM 10 MG: 5 TABLET ORAL at 00:18

## 2022-01-01 RX ADMIN — METHOCARBAMOL TABLETS 750 MG: 750 TABLET, COATED ORAL at 08:21

## 2022-01-01 RX ADMIN — PANTOPRAZOLE SODIUM 40 MG: 40 TABLET, DELAYED RELEASE ORAL at 07:53

## 2022-01-01 RX ADMIN — GABAPENTIN 300 MG: 300 CAPSULE ORAL at 20:09

## 2022-01-01 RX ADMIN — OXYCODONE HYDROCHLORIDE 2.5 MG: 5 TABLET ORAL at 00:01

## 2022-01-01 RX ADMIN — ROCURONIUM BROMIDE 30 MG: 10 INJECTION INTRAVENOUS at 20:02

## 2022-01-01 RX ADMIN — LEVETIRACETAM 500 MG: 5 INJECTION INTRAVENOUS at 18:27

## 2022-01-01 RX ADMIN — Medication 75 MCG/HR: at 07:51

## 2022-01-01 RX ADMIN — Medication 50 MCG: at 17:42

## 2022-01-01 RX ADMIN — DIBASIC SODIUM PHOSPHATE, MONOBASIC POTASSIUM PHOSPHATE AND MONOBASIC SODIUM PHOSPHATE 2 TABLET: 852; 155; 130 TABLET ORAL at 20:56

## 2022-01-01 RX ADMIN — DEXMEDETOMIDINE HYDROCHLORIDE 1 MCG/KG/HR: 4 INJECTION, SOLUTION INTRAVENOUS at 01:50

## 2022-01-01 RX ADMIN — ENOXAPARIN SODIUM 30 MG: 100 INJECTION SUBCUTANEOUS at 08:21

## 2022-01-01 RX ADMIN — Medication 100 MCG/HR: at 23:21

## 2022-01-01 RX ADMIN — DIBASIC SODIUM PHOSPHATE, MONOBASIC POTASSIUM PHOSPHATE AND MONOBASIC SODIUM PHOSPHATE 2 TABLET: 852; 155; 130 TABLET ORAL at 07:44

## 2022-01-01 RX ADMIN — DEXMEDETOMIDINE HYDROCHLORIDE 1 MCG/KG/HR: 4 INJECTION, SOLUTION INTRAVENOUS at 23:44

## 2022-01-01 RX ADMIN — POTASSIUM BICARBONATE 40 MEQ: 782 TABLET, EFFERVESCENT ORAL at 06:41

## 2022-01-01 RX ADMIN — ENOXAPARIN SODIUM 30 MG: 100 INJECTION SUBCUTANEOUS at 08:10

## 2022-01-01 RX ADMIN — METOCLOPRAMIDE 10 MG: 5 INJECTION, SOLUTION INTRAMUSCULAR; INTRAVENOUS at 09:47

## 2022-01-01 RX ADMIN — ATORVASTATIN CALCIUM 20 MG: 20 TABLET, FILM COATED ORAL at 21:28

## 2022-01-01 RX ADMIN — ACETAMINOPHEN 1000 MG: 500 TABLET ORAL at 13:31

## 2022-01-01 RX ADMIN — THIAMINE HYDROCHLORIDE 500 MG: 100 INJECTION, SOLUTION INTRAMUSCULAR; INTRAVENOUS at 20:39

## 2022-01-01 RX ADMIN — IBUPROFEN 400 MG: 400 TABLET, FILM COATED ORAL at 19:48

## 2022-01-01 RX ADMIN — AMIODARONE HYDROCHLORIDE 150 MG: 150 INJECTION, SOLUTION INTRAVENOUS at 06:13

## 2022-01-01 RX ADMIN — THIAMINE HYDROCHLORIDE 500 MG: 100 INJECTION, SOLUTION INTRAMUSCULAR; INTRAVENOUS at 13:08

## 2022-01-01 RX ADMIN — DEXMEDETOMIDINE HYDROCHLORIDE 1 MCG/KG/HR: 4 INJECTION, SOLUTION INTRAVENOUS at 04:11

## 2022-01-01 RX ADMIN — ACETAMINOPHEN 1000 MG: 500 TABLET ORAL at 20:52

## 2022-01-01 RX ADMIN — DIAZEPAM 10 MG: 5 TABLET ORAL at 12:22

## 2022-01-01 RX ADMIN — SODIUM CHLORIDE, POTASSIUM CHLORIDE, SODIUM LACTATE AND CALCIUM CHLORIDE 500 ML: 600; 310; 30; 20 INJECTION, SOLUTION INTRAVENOUS at 11:29

## 2022-01-01 RX ADMIN — SODIUM CHLORIDE, PRESERVATIVE FREE 20 MG: 5 INJECTION INTRAVENOUS at 22:02

## 2022-01-01 RX ADMIN — PANTOPRAZOLE SODIUM 40 MG: 40 TABLET, DELAYED RELEASE ORAL at 07:44

## 2022-01-01 RX ADMIN — ACETAMINOPHEN 1000 MG: 500 TABLET ORAL at 04:53

## 2022-01-01 RX ADMIN — DIAZEPAM 10 MG: 5 TABLET ORAL at 16:32

## 2022-01-01 RX ADMIN — LORAZEPAM 2 MG: 2 INJECTION INTRAMUSCULAR; INTRAVENOUS at 22:05

## 2022-01-01 RX ADMIN — OXYCODONE HYDROCHLORIDE 5 MG: 5 TABLET ORAL at 20:33

## 2022-01-01 RX ADMIN — METHOCARBAMOL TABLETS 750 MG: 750 TABLET, COATED ORAL at 14:57

## 2022-01-01 RX ADMIN — DEXMEDETOMIDINE HYDROCHLORIDE 1 MCG/KG/HR: 4 INJECTION, SOLUTION INTRAVENOUS at 05:16

## 2022-01-01 RX ADMIN — HYDRALAZINE HYDROCHLORIDE 10 MG: 20 INJECTION INTRAMUSCULAR; INTRAVENOUS at 18:17

## 2022-01-01 RX ADMIN — ALBUTEROL SULFATE 2 PUFF: 90 AEROSOL, METERED RESPIRATORY (INHALATION) at 12:52

## 2022-01-01 RX ADMIN — PROPOFOL 40 MCG/KG/MIN: 10 INJECTION, EMULSION INTRAVENOUS at 02:16

## 2022-01-01 RX ADMIN — PHENYLEPHRINE HYDROCHLORIDE 50 MCG/MIN: 10 INJECTION INTRAVENOUS at 12:01

## 2022-01-01 RX ADMIN — PIPERACILLIN AND TAZOBACTAM 3375 MG: 3; .375 INJECTION, POWDER, LYOPHILIZED, FOR SOLUTION INTRAVENOUS at 05:32

## 2022-01-01 RX ADMIN — DIAZEPAM 10 MG: 5 TABLET ORAL at 23:21

## 2022-01-01 RX ADMIN — QUETIAPINE FUMARATE 50 MG: 25 TABLET ORAL at 09:21

## 2022-01-01 RX ADMIN — MIDAZOLAM HYDROCHLORIDE 2 MG: 1 INJECTION, SOLUTION INTRAMUSCULAR; INTRAVENOUS at 17:20

## 2022-01-01 RX ADMIN — DEXMEDETOMIDINE HYDROCHLORIDE 1.5 MCG/KG/HR: 4 INJECTION, SOLUTION INTRAVENOUS at 05:17

## 2022-01-01 RX ADMIN — SODIUM CHLORIDE, PRESERVATIVE FREE 20 MG: 5 INJECTION INTRAVENOUS at 21:37

## 2022-01-01 RX ADMIN — DEXMEDETOMIDINE HYDROCHLORIDE 1.2 MCG/KG/HR: 4 INJECTION, SOLUTION INTRAVENOUS at 15:50

## 2022-01-01 RX ADMIN — ENOXAPARIN SODIUM 30 MG: 100 INJECTION SUBCUTANEOUS at 20:28

## 2022-01-01 RX ADMIN — ATORVASTATIN CALCIUM 20 MG: 20 TABLET, FILM COATED ORAL at 21:38

## 2022-01-01 RX ADMIN — CHLORHEXIDINE GLUCONATE 0.12% ORAL RINSE 15 ML: 1.2 LIQUID ORAL at 08:03

## 2022-01-01 RX ADMIN — DEXMEDETOMIDINE HYDROCHLORIDE 1.5 MCG/KG/HR: 4 INJECTION, SOLUTION INTRAVENOUS at 02:40

## 2022-01-01 RX ADMIN — DEXMEDETOMIDINE HYDROCHLORIDE 1 MCG/KG/HR: 4 INJECTION, SOLUTION INTRAVENOUS at 07:54

## 2022-01-01 RX ADMIN — SODIUM CHLORIDE, POTASSIUM CHLORIDE, SODIUM LACTATE AND CALCIUM CHLORIDE: 600; 310; 30; 20 INJECTION, SOLUTION INTRAVENOUS at 05:18

## 2022-01-01 RX ADMIN — DIBASIC SODIUM PHOSPHATE, MONOBASIC POTASSIUM PHOSPHATE AND MONOBASIC SODIUM PHOSPHATE 2 TABLET: 852; 155; 130 TABLET ORAL at 10:03

## 2022-01-01 RX ADMIN — GABAPENTIN 300 MG: 300 CAPSULE ORAL at 08:08

## 2022-01-01 RX ADMIN — POLYETHYLENE GLYCOL 3350 17 G: 17 POWDER, FOR SOLUTION ORAL at 09:20

## 2022-01-01 RX ADMIN — Medication 100 MCG: at 16:29

## 2022-01-01 RX ADMIN — POLYETHYLENE GLYCOL 3350 17 G: 17 POWDER, FOR SOLUTION ORAL at 08:22

## 2022-01-01 RX ADMIN — DEXMEDETOMIDINE HYDROCHLORIDE 1.2 MCG/KG/HR: 4 INJECTION, SOLUTION INTRAVENOUS at 12:35

## 2022-01-01 RX ADMIN — ENOXAPARIN SODIUM 30 MG: 100 INJECTION SUBCUTANEOUS at 20:09

## 2022-01-01 RX ADMIN — Medication 50 MCG/HR: at 11:08

## 2022-01-01 RX ADMIN — GABAPENTIN 300 MG: 300 CAPSULE ORAL at 08:20

## 2022-01-01 RX ADMIN — ATORVASTATIN CALCIUM 20 MG: 20 TABLET, FILM COATED ORAL at 19:51

## 2022-01-01 RX ADMIN — ACETAMINOPHEN 1000 MG: 500 TABLET ORAL at 19:46

## 2022-01-01 RX ADMIN — SODIUM CHLORIDE, POTASSIUM CHLORIDE, SODIUM LACTATE AND CALCIUM CHLORIDE: 600; 310; 30; 20 INJECTION, SOLUTION INTRAVENOUS at 22:58

## 2022-01-01 RX ADMIN — DIBASIC SODIUM PHOSPHATE, MONOBASIC POTASSIUM PHOSPHATE AND MONOBASIC SODIUM PHOSPHATE 2 TABLET: 852; 155; 130 TABLET ORAL at 20:02

## 2022-01-01 RX ADMIN — ENOXAPARIN SODIUM 30 MG: 100 INJECTION SUBCUTANEOUS at 20:00

## 2022-01-01 RX ADMIN — METOCLOPRAMIDE 10 MG: 5 INJECTION, SOLUTION INTRAMUSCULAR; INTRAVENOUS at 05:18

## 2022-01-01 RX ADMIN — FAMOTIDINE 20 MG: 10 INJECTION INTRAVENOUS at 21:28

## 2022-01-01 RX ADMIN — ACETAMINOPHEN 1000 MG: 500 TABLET ORAL at 20:27

## 2022-01-01 RX ADMIN — METHOCARBAMOL TABLETS 750 MG: 750 TABLET, COATED ORAL at 08:07

## 2022-01-01 RX ADMIN — ACETAMINOPHEN 1000 MG: 500 TABLET ORAL at 21:52

## 2022-01-01 RX ADMIN — SODIUM CHLORIDE, PRESERVATIVE FREE 20 MG: 5 INJECTION INTRAVENOUS at 08:08

## 2022-01-01 RX ADMIN — QUETIAPINE FUMARATE 50 MG: 25 TABLET ORAL at 02:18

## 2022-01-01 RX ADMIN — POTASSIUM BICARBONATE 40 MEQ: 782 TABLET, EFFERVESCENT ORAL at 14:06

## 2022-01-01 RX ADMIN — DEXMEDETOMIDINE HYDROCHLORIDE 1.2 MCG/KG/HR: 4 INJECTION, SOLUTION INTRAVENOUS at 18:57

## 2022-01-01 RX ADMIN — Medication 50 MCG: at 18:24

## 2022-01-01 RX ADMIN — DIAZEPAM 5 MG: 5 TABLET ORAL at 12:35

## 2022-01-01 RX ADMIN — DIAZEPAM 10 MG: 5 TABLET ORAL at 17:32

## 2022-01-01 RX ADMIN — POLYETHYLENE GLYCOL 3350 17 G: 17 POWDER, FOR SOLUTION ORAL at 08:01

## 2022-01-01 RX ADMIN — THIAMINE HYDROCHLORIDE 500 MG: 100 INJECTION, SOLUTION INTRAMUSCULAR; INTRAVENOUS at 21:44

## 2022-01-01 RX ADMIN — LORAZEPAM 2 MG: 2 INJECTION INTRAMUSCULAR; INTRAVENOUS at 01:53

## 2022-01-01 RX ADMIN — DIAZEPAM 10 MG: 5 TABLET ORAL at 21:02

## 2022-01-01 RX ADMIN — METOCLOPRAMIDE 10 MG: 5 INJECTION, SOLUTION INTRAMUSCULAR; INTRAVENOUS at 16:11

## 2022-01-01 RX ADMIN — Medication 100 MCG/HR: at 15:07

## 2022-01-01 RX ADMIN — THIAMINE HYDROCHLORIDE 500 MG: 100 INJECTION, SOLUTION INTRAMUSCULAR; INTRAVENOUS at 23:07

## 2022-01-01 RX ADMIN — FENTANYL CITRATE 50 MCG: 50 INJECTION, SOLUTION INTRAMUSCULAR; INTRAVENOUS at 06:04

## 2022-01-01 RX ADMIN — QUETIAPINE FUMARATE 50 MG: 25 TABLET ORAL at 08:20

## 2022-01-01 RX ADMIN — POTASSIUM CHLORIDE 40 MEQ: 29.8 INJECTION, SOLUTION INTRAVENOUS at 23:08

## 2022-01-01 RX ADMIN — MAGNESIUM SULFATE 2000 MG: 2 INJECTION INTRAVENOUS at 06:53

## 2022-01-01 RX ADMIN — SODIUM CHLORIDE, PRESERVATIVE FREE 20 MG: 5 INJECTION INTRAVENOUS at 08:01

## 2022-01-01 RX ADMIN — OXYCODONE HYDROCHLORIDE 5 MG: 5 TABLET ORAL at 16:32

## 2022-01-01 RX ADMIN — OXYCODONE HYDROCHLORIDE 5 MG: 5 TABLET ORAL at 19:26

## 2022-01-01 RX ADMIN — DEXMEDETOMIDINE HYDROCHLORIDE 0.2 MCG/KG/HR: 4 INJECTION, SOLUTION INTRAVENOUS at 01:28

## 2022-01-01 RX ADMIN — ENOXAPARIN SODIUM 30 MG: 100 INJECTION SUBCUTANEOUS at 08:08

## 2022-01-01 RX ADMIN — ROCURONIUM BROMIDE 50 MG: 10 INJECTION INTRAVENOUS at 16:11

## 2022-01-01 RX ADMIN — CHLORHEXIDINE GLUCONATE 0.12% ORAL RINSE 15 ML: 1.2 LIQUID ORAL at 21:37

## 2022-01-01 RX ADMIN — HYDRALAZINE HYDROCHLORIDE 10 MG: 20 INJECTION INTRAMUSCULAR; INTRAVENOUS at 23:00

## 2022-01-01 RX ADMIN — LORAZEPAM 1 MG: 2 INJECTION INTRAMUSCULAR; INTRAVENOUS at 10:43

## 2022-01-01 RX ADMIN — INSULIN LISPRO 9 UNITS: 100 INJECTION, SOLUTION INTRAVENOUS; SUBCUTANEOUS at 09:47

## 2022-01-01 RX ADMIN — OXYCODONE HYDROCHLORIDE 5 MG: 5 TABLET ORAL at 12:51

## 2022-01-01 RX ADMIN — AMIODARONE HYDROCHLORIDE 0.5 MG/MIN: 50 INJECTION, SOLUTION INTRAVENOUS at 01:59

## 2022-01-01 RX ADMIN — DIAZEPAM 10 MG: 5 TABLET ORAL at 12:55

## 2022-01-01 RX ADMIN — DIAZEPAM 10 MG: 5 TABLET ORAL at 11:02

## 2022-01-01 RX ADMIN — SODIUM CHLORIDE, POTASSIUM CHLORIDE, SODIUM LACTATE AND CALCIUM CHLORIDE: 600; 310; 30; 20 INJECTION, SOLUTION INTRAVENOUS at 20:07

## 2022-01-01 ASSESSMENT — PAIN SCALES - GENERAL
PAINLEVEL_OUTOF10: 8
PAINLEVEL_OUTOF10: 0
PAINLEVEL_OUTOF10: 5
PAINLEVEL_OUTOF10: 0
PAINLEVEL_OUTOF10: 1
PAINLEVEL_OUTOF10: 7
PAINLEVEL_OUTOF10: 3
PAINLEVEL_OUTOF10: 10
PAINLEVEL_OUTOF10: 0
PAINLEVEL_OUTOF10: 2
PAINLEVEL_OUTOF10: 0
PAINLEVEL_OUTOF10: 0
PAINLEVEL_OUTOF10: 5
PAINLEVEL_OUTOF10: 10
PAINLEVEL_OUTOF10: 8
PAINLEVEL_OUTOF10: 0
PAINLEVEL_OUTOF10: 10
PAINLEVEL_OUTOF10: 0
PAINLEVEL_OUTOF10: 10
PAINLEVEL_OUTOF10: 9
PAINLEVEL_OUTOF10: 1
PAINLEVEL_OUTOF10: 3
PAINLEVEL_OUTOF10: 7
PAINLEVEL_OUTOF10: 0
PAINLEVEL_OUTOF10: 0
PAINLEVEL_OUTOF10: 7
PAINLEVEL_OUTOF10: 0
PAINLEVEL_OUTOF10: 0
PAINLEVEL_OUTOF10: 7
PAINLEVEL_OUTOF10: 4
PAINLEVEL_OUTOF10: 1
PAINLEVEL_OUTOF10: 0
PAINLEVEL_OUTOF10: 10
PAINLEVEL_OUTOF10: 0
PAINLEVEL_OUTOF10: 10
PAINLEVEL_OUTOF10: 0
PAINLEVEL_OUTOF10: 1
PAINLEVEL_OUTOF10: 2
PAINLEVEL_OUTOF10: 4
PAINLEVEL_OUTOF10: 10
PAINLEVEL_OUTOF10: 0
PAINLEVEL_OUTOF10: 10
PAINLEVEL_OUTOF10: 0
PAINLEVEL_OUTOF10: 10
PAINLEVEL_OUTOF10: 0
PAINLEVEL_OUTOF10: 5
PAINLEVEL_OUTOF10: 0
PAINLEVEL_OUTOF10: 8
PAINLEVEL_OUTOF10: 0
PAINLEVEL_OUTOF10: 10
PAINLEVEL_OUTOF10: 0
PAINLEVEL_OUTOF10: 10
PAINLEVEL_OUTOF10: 0
PAINLEVEL_OUTOF10: 0
PAINLEVEL_OUTOF10: 10

## 2022-01-01 ASSESSMENT — PULMONARY FUNCTION TESTS
PIF_VALUE: 17
PIF_VALUE: 27
PIF_VALUE: 24
PIF_VALUE: 25
PIF_VALUE: 24
PIF_VALUE: 27
PIF_VALUE: 24
PIF_VALUE: 25
PIF_VALUE: 21
PIF_VALUE: 29
PIF_VALUE: 22
PIF_VALUE: 30
PIF_VALUE: 33
PIF_VALUE: 20
PIF_VALUE: 21
PIF_VALUE: 25
PIF_VALUE: 28
PIF_VALUE: 22
PIF_VALUE: 21
PIF_VALUE: 23
PIF_VALUE: 26
PIF_VALUE: 23
PIF_VALUE: 25
PIF_VALUE: 24
PIF_VALUE: 23
PIF_VALUE: 24
PIF_VALUE: 23
PIF_VALUE: 30
PIF_VALUE: 33
PIF_VALUE: 23
PIF_VALUE: 28
PIF_VALUE: 25
PIF_VALUE: 23
PIF_VALUE: 25
PIF_VALUE: 23
PIF_VALUE: 24
PIF_VALUE: 28
PIF_VALUE: 24
PIF_VALUE: 24
PIF_VALUE: 27
PIF_VALUE: 22
PIF_VALUE: 24
PIF_VALUE: 24
PIF_VALUE: 31
PIF_VALUE: 26
PIF_VALUE: 24
PIF_VALUE: 23
PIF_VALUE: 25
PIF_VALUE: 24
PIF_VALUE: 17
PIF_VALUE: 24
PIF_VALUE: 23
PIF_VALUE: 24
PIF_VALUE: 24
PIF_VALUE: 26
PIF_VALUE: 24
PIF_VALUE: 23
PIF_VALUE: 28
PIF_VALUE: 28
PIF_VALUE: 27
PIF_VALUE: 22
PIF_VALUE: 24
PIF_VALUE: 24
PIF_VALUE: 23
PIF_VALUE: 24
PIF_VALUE: 26
PIF_VALUE: 24
PIF_VALUE: 26
PIF_VALUE: 22
PIF_VALUE: 23
PIF_VALUE: 13
PIF_VALUE: 23
PIF_VALUE: 22
PIF_VALUE: 27
PIF_VALUE: 24
PIF_VALUE: 22
PIF_VALUE: 22
PIF_VALUE: 23
PIF_VALUE: 22
PIF_VALUE: 25
PIF_VALUE: 25
PIF_VALUE: 23
PIF_VALUE: 23
PIF_VALUE: 18
PIF_VALUE: 26
PIF_VALUE: 24
PIF_VALUE: 23
PIF_VALUE: 27
PIF_VALUE: 24
PIF_VALUE: 25
PIF_VALUE: 23
PIF_VALUE: 24
PIF_VALUE: 22
PIF_VALUE: 23
PIF_VALUE: 24
PIF_VALUE: 23
PIF_VALUE: 26
PIF_VALUE: 27
PIF_VALUE: 30
PIF_VALUE: 23
PIF_VALUE: 23
PIF_VALUE: 24
PIF_VALUE: 29
PIF_VALUE: 23
PIF_VALUE: 25
PIF_VALUE: 33
PIF_VALUE: 23
PIF_VALUE: 22
PIF_VALUE: 21
PIF_VALUE: 24
PIF_VALUE: 26
PIF_VALUE: 23
PIF_VALUE: 17
PIF_VALUE: 25
PIF_VALUE: 24
PIF_VALUE: 24
PIF_VALUE: 23
PIF_VALUE: 26
PIF_VALUE: 23
PIF_VALUE: 25
PIF_VALUE: 24
PIF_VALUE: 25
PIF_VALUE: 23
PIF_VALUE: 30
PIF_VALUE: 25
PIF_VALUE: 24
PIF_VALUE: 25
PIF_VALUE: 24
PIF_VALUE: 21
PIF_VALUE: 27
PIF_VALUE: 24
PIF_VALUE: 22
PIF_VALUE: 24
PIF_VALUE: 27
PIF_VALUE: 24
PIF_VALUE: 21
PIF_VALUE: 24
PIF_VALUE: 25
PIF_VALUE: 23
PIF_VALUE: 23
PIF_VALUE: 27
PIF_VALUE: 23
PIF_VALUE: 21
PIF_VALUE: 24
PIF_VALUE: 26
PIF_VALUE: 26
PIF_VALUE: 24
PIF_VALUE: 21
PIF_VALUE: 25
PIF_VALUE: 22
PIF_VALUE: 24
PIF_VALUE: 25
PIF_VALUE: 23
PIF_VALUE: 24
PIF_VALUE: 25
PIF_VALUE: 7
PIF_VALUE: 23
PIF_VALUE: 26
PIF_VALUE: 30
PIF_VALUE: 24
PIF_VALUE: 24
PIF_VALUE: 23
PIF_VALUE: 24
PIF_VALUE: 22
PIF_VALUE: 27
PIF_VALUE: 21
PIF_VALUE: 23
PIF_VALUE: 24
PIF_VALUE: 23
PIF_VALUE: 22
PIF_VALUE: 25
PIF_VALUE: 21
PIF_VALUE: 21
PIF_VALUE: 17
PIF_VALUE: 24
PIF_VALUE: 27
PIF_VALUE: 23
PIF_VALUE: 23
PIF_VALUE: 21
PIF_VALUE: 24
PIF_VALUE: 29
PIF_VALUE: 23
PIF_VALUE: 19
PIF_VALUE: 22
PIF_VALUE: 24
PIF_VALUE: 24
PIF_VALUE: 35
PIF_VALUE: 24
PIF_VALUE: 24
PIF_VALUE: 34
PIF_VALUE: 22
PIF_VALUE: 17
PIF_VALUE: 25
PIF_VALUE: 26
PIF_VALUE: 23
PIF_VALUE: 22
PIF_VALUE: 26
PIF_VALUE: 24
PIF_VALUE: 25
PIF_VALUE: 24
PIF_VALUE: 29
PIF_VALUE: 26
PIF_VALUE: 17
PIF_VALUE: 23
PIF_VALUE: 30
PIF_VALUE: 25
PIF_VALUE: 25
PIF_VALUE: 28
PIF_VALUE: 26
PIF_VALUE: 28
PIF_VALUE: 24
PIF_VALUE: 21
PIF_VALUE: 27
PIF_VALUE: 23
PIF_VALUE: 22
PIF_VALUE: 16
PIF_VALUE: 31
PIF_VALUE: 22
PIF_VALUE: 23
PIF_VALUE: 26
PIF_VALUE: 24
PIF_VALUE: 27
PIF_VALUE: 25
PIF_VALUE: 25
PIF_VALUE: 30
PIF_VALUE: 24
PIF_VALUE: 21
PIF_VALUE: 26
PIF_VALUE: 24
PIF_VALUE: 31
PIF_VALUE: 23
PIF_VALUE: 23
PIF_VALUE: 24
PIF_VALUE: 22
PIF_VALUE: 20
PIF_VALUE: 24
PIF_VALUE: 26
PIF_VALUE: 17
PIF_VALUE: 26
PIF_VALUE: 25
PIF_VALUE: 23
PIF_VALUE: 24
PIF_VALUE: 3
PIF_VALUE: 24
PIF_VALUE: 23
PIF_VALUE: 17
PIF_VALUE: 26
PIF_VALUE: 25
PIF_VALUE: 24
PIF_VALUE: 23
PIF_VALUE: 21
PIF_VALUE: 24
PIF_VALUE: 25
PIF_VALUE: 21
PIF_VALUE: 24
PIF_VALUE: 21
PIF_VALUE: 11
PIF_VALUE: 20
PIF_VALUE: 27
PIF_VALUE: 24
PIF_VALUE: 27
PIF_VALUE: 28
PIF_VALUE: 28
PIF_VALUE: 24
PIF_VALUE: 30
PIF_VALUE: 24
PIF_VALUE: 25
PIF_VALUE: 22
PIF_VALUE: 24
PIF_VALUE: 21
PIF_VALUE: 24
PIF_VALUE: 24
PIF_VALUE: 21
PIF_VALUE: 26
PIF_VALUE: 21
PIF_VALUE: 21
PIF_VALUE: 26
PIF_VALUE: 23
PIF_VALUE: 25
PIF_VALUE: 25
PIF_VALUE: 30
PIF_VALUE: 18
PIF_VALUE: 25
PIF_VALUE: 24
PIF_VALUE: 21
PIF_VALUE: 23
PIF_VALUE: 24
PIF_VALUE: 16
PIF_VALUE: 25
PIF_VALUE: 21
PIF_VALUE: 23
PIF_VALUE: 21
PIF_VALUE: 24
PIF_VALUE: 23
PIF_VALUE: 24
PIF_VALUE: 24
PIF_VALUE: 25
PIF_VALUE: 31
PIF_VALUE: 23
PIF_VALUE: 23
PIF_VALUE: 24
PIF_VALUE: 31
PIF_VALUE: 24
PIF_VALUE: 26
PIF_VALUE: 23
PIF_VALUE: 30
PIF_VALUE: 24
PIF_VALUE: 23
PIF_VALUE: 22
PIF_VALUE: 25
PIF_VALUE: 25
PIF_VALUE: 16
PIF_VALUE: 15
PIF_VALUE: 28
PIF_VALUE: 27
PIF_VALUE: 24
PIF_VALUE: 23
PIF_VALUE: 30
PIF_VALUE: 25
PIF_VALUE: 21
PIF_VALUE: 26
PIF_VALUE: 23
PIF_VALUE: 22
PIF_VALUE: 23
PIF_VALUE: 24
PIF_VALUE: 23
PIF_VALUE: 19
PIF_VALUE: 24
PIF_VALUE: 26
PIF_VALUE: 30
PIF_VALUE: 23
PIF_VALUE: 23
PIF_VALUE: 24
PIF_VALUE: 17
PIF_VALUE: 28
PIF_VALUE: 2
PIF_VALUE: 20
PIF_VALUE: 26
PIF_VALUE: 32
PIF_VALUE: 25
PIF_VALUE: 29
PIF_VALUE: 18
PIF_VALUE: 25
PIF_VALUE: 24
PIF_VALUE: 23
PIF_VALUE: 25
PIF_VALUE: 21
PIF_VALUE: 23
PIF_VALUE: 24
PIF_VALUE: 23
PIF_VALUE: 24
PIF_VALUE: 23
PIF_VALUE: 29
PIF_VALUE: 25
PIF_VALUE: 24
PIF_VALUE: 24
PIF_VALUE: 28
PIF_VALUE: 23
PIF_VALUE: 24
PIF_VALUE: 24
PIF_VALUE: 26
PIF_VALUE: 23
PIF_VALUE: 27
PIF_VALUE: 25
PIF_VALUE: 30
PIF_VALUE: 30
PIF_VALUE: 24
PIF_VALUE: 25
PIF_VALUE: 23
PIF_VALUE: 21
PIF_VALUE: 24
PIF_VALUE: 23
PIF_VALUE: 28
PIF_VALUE: 27
PIF_VALUE: 25
PIF_VALUE: 21
PIF_VALUE: 24
PIF_VALUE: 19
PIF_VALUE: 24
PIF_VALUE: 25
PIF_VALUE: 28
PIF_VALUE: 24
PIF_VALUE: 23
PIF_VALUE: 21
PIF_VALUE: 24
PIF_VALUE: 23
PIF_VALUE: 25
PIF_VALUE: 23
PIF_VALUE: 24
PIF_VALUE: 24
PIF_VALUE: 25
PIF_VALUE: 25
PIF_VALUE: 27
PIF_VALUE: 27
PIF_VALUE: 30
PIF_VALUE: 23
PIF_VALUE: 25
PIF_VALUE: 23
PIF_VALUE: 22
PIF_VALUE: 30
PIF_VALUE: 24
PIF_VALUE: 26
PIF_VALUE: 27
PIF_VALUE: 26
PIF_VALUE: 23
PIF_VALUE: 25
PIF_VALUE: 24
PIF_VALUE: 26
PIF_VALUE: 17
PIF_VALUE: 25
PIF_VALUE: 24
PIF_VALUE: 27
PIF_VALUE: 24
PIF_VALUE: 24
PIF_VALUE: 20
PIF_VALUE: 24
PIF_VALUE: 23
PIF_VALUE: 21
PIF_VALUE: 23
PIF_VALUE: 23
PIF_VALUE: 24
PIF_VALUE: 24
PIF_VALUE: 21
PIF_VALUE: 31
PIF_VALUE: 21
PIF_VALUE: 26
PIF_VALUE: 24
PIF_VALUE: 22
PIF_VALUE: 22
PIF_VALUE: 24
PIF_VALUE: 23
PIF_VALUE: 21
PIF_VALUE: 23
PIF_VALUE: 25
PIF_VALUE: 27
PIF_VALUE: 22
PIF_VALUE: 23
PIF_VALUE: 30
PIF_VALUE: 24
PIF_VALUE: 24
PIF_VALUE: 26
PIF_VALUE: 31
PIF_VALUE: 24
PIF_VALUE: 23
PIF_VALUE: 21
PIF_VALUE: 23
PIF_VALUE: 29
PIF_VALUE: 25
PIF_VALUE: 24
PIF_VALUE: 21
PIF_VALUE: 24
PIF_VALUE: 23
PIF_VALUE: 24
PIF_VALUE: 22
PIF_VALUE: 31
PIF_VALUE: 24
PIF_VALUE: 22
PIF_VALUE: 23
PIF_VALUE: 25
PIF_VALUE: 24
PIF_VALUE: 23
PIF_VALUE: 30
PIF_VALUE: 24
PIF_VALUE: 25
PIF_VALUE: 24
PIF_VALUE: 25
PIF_VALUE: 23
PIF_VALUE: 21
PIF_VALUE: 24
PIF_VALUE: 25
PIF_VALUE: 23
PIF_VALUE: 24
PIF_VALUE: 22
PIF_VALUE: 24
PIF_VALUE: 21
PIF_VALUE: 24
PIF_VALUE: 31
PIF_VALUE: 28
PIF_VALUE: 22
PIF_VALUE: 21
PIF_VALUE: 24
PIF_VALUE: 21
PIF_VALUE: 24
PIF_VALUE: 23
PIF_VALUE: 21
PIF_VALUE: 23
PIF_VALUE: 26
PIF_VALUE: 27
PIF_VALUE: 24
PIF_VALUE: 25
PIF_VALUE: 33
PIF_VALUE: 24
PIF_VALUE: 24
PIF_VALUE: 21
PIF_VALUE: 22
PIF_VALUE: 23
PIF_VALUE: 24

## 2022-01-01 ASSESSMENT — PAIN DESCRIPTION - DESCRIPTORS
DESCRIPTORS: ACHING;DISCOMFORT;SORE
DESCRIPTORS: DISCOMFORT;DULL;ACHING
DESCRIPTORS: DISCOMFORT;PRESSURE;SORE
DESCRIPTORS: ACHING
DESCRIPTORS: ACHING;SORE
DESCRIPTORS: ACHING;SORE
DESCRIPTORS: ACHING;SHARP

## 2022-01-01 ASSESSMENT — PAIN SCALES - PAIN ASSESSMENT IN ADVANCED DEMENTIA (PAINAD)
CONSOLABILITY: 0
FACIALEXPRESSION: 1
NEGVOCALIZATION: 0
BREATHING: 1
CONSOLABILITY: 0
TOTALSCORE: 2
CONSOLABILITY: 1
BREATHING: 1
BREATHING: 1
TOTALSCORE: 4
TOTALSCORE: 4
CONSOLABILITY: 1
TOTALSCORE: 4
BREATHING: 1
FACIALEXPRESSION: 1
FACIALEXPRESSION: 1
TOTALSCORE: 4
BREATHING: 1
BREATHING: 1
FACIALEXPRESSION: 1
CONSOLABILITY: 0
TOTALSCORE: 4
CONSOLABILITY: 1
FACIALEXPRESSION: 1
BODYLANGUAGE: 0
NEGVOCALIZATION: 0
FACIALEXPRESSION: 1
BODYLANGUAGE: 0
NEGVOCALIZATION: 0
CONSOLABILITY: 1
BREATHING: 1
NEGVOCALIZATION: 0
BODYLANGUAGE: 1
TOTALSCORE: 4
CONSOLABILITY: 1
CONSOLABILITY: 1
NEGVOCALIZATION: 0
BODYLANGUAGE: 1
NEGVOCALIZATION: 0
NEGVOCALIZATION: 0
BODYLANGUAGE: 0
NEGVOCALIZATION: 0
BODYLANGUAGE: 1
BODYLANGUAGE: 1
TOTALSCORE: 4
CONSOLABILITY: 1
FACIALEXPRESSION: 1
BREATHING: 1
TOTALSCORE: 4
BREATHING: 1
BODYLANGUAGE: 1
FACIALEXPRESSION: 1
BREATHING: 1
BODYLANGUAGE: 1
BREATHING: 1
CONSOLABILITY: 1
FACIALEXPRESSION: 1
BREATHING: 1
BREATHING: 1
NEGVOCALIZATION: 0
TOTALSCORE: 4
CONSOLABILITY: 0
CONSOLABILITY: 1
TOTALSCORE: 4
NEGVOCALIZATION: 0
BREATHING: 1
FACIALEXPRESSION: 1
TOTALSCORE: 4
TOTALSCORE: 2
FACIALEXPRESSION: 1
BREATHING: 1
CONSOLABILITY: 0
TOTALSCORE: 4
FACIALEXPRESSION: 1
BODYLANGUAGE: 1
CONSOLABILITY: 1
NEGVOCALIZATION: 0
CONSOLABILITY: 1
CONSOLABILITY: 0
BODYLANGUAGE: 0
BREATHING: 1
BODYLANGUAGE: 1
CONSOLABILITY: 1
TOTALSCORE: 4
NEGVOCALIZATION: 0
FACIALEXPRESSION: 1
BREATHING: 1
CONSOLABILITY: 0
FACIALEXPRESSION: 1
BODYLANGUAGE: 1
TOTALSCORE: 4
BODYLANGUAGE: 1
TOTALSCORE: 2
BODYLANGUAGE: 1
NEGVOCALIZATION: 0
FACIALEXPRESSION: 1
NEGVOCALIZATION: 0
FACIALEXPRESSION: 1
NEGVOCALIZATION: 0
TOTALSCORE: 4
BODYLANGUAGE: 1
BODYLANGUAGE: 1
TOTALSCORE: 2
CONSOLABILITY: 1
NEGVOCALIZATION: 0
BODYLANGUAGE: 1
FACIALEXPRESSION: 1
CONSOLABILITY: 1
BODYLANGUAGE: 0
NEGVOCALIZATION: 0
CONSOLABILITY: 1
CONSOLABILITY: 1
NEGVOCALIZATION: 0
BODYLANGUAGE: 0
BODYLANGUAGE: 0
NEGVOCALIZATION: 0
NEGVOCALIZATION: 0
BREATHING: 1
BODYLANGUAGE: 1
BREATHING: 1
NEGVOCALIZATION: 0
CONSOLABILITY: 1
CONSOLABILITY: 1
TOTALSCORE: 2
TOTALSCORE: 2
FACIALEXPRESSION: 1
BREATHING: 1
TOTALSCORE: 2
BREATHING: 1
TOTALSCORE: 4
BODYLANGUAGE: 1
BREATHING: 1
FACIALEXPRESSION: 1
TOTALSCORE: 4
TOTALSCORE: 4
NEGVOCALIZATION: 0
FACIALEXPRESSION: 1
BREATHING: 1
FACIALEXPRESSION: 1
NEGVOCALIZATION: 0

## 2022-01-01 ASSESSMENT — PAIN DESCRIPTION - PROGRESSION
CLINICAL_PROGRESSION: NOT CHANGED
CLINICAL_PROGRESSION: GRADUALLY WORSENING
CLINICAL_PROGRESSION: NOT CHANGED

## 2022-01-01 ASSESSMENT — PAIN DESCRIPTION - PAIN TYPE
TYPE: ACUTE PAIN

## 2022-01-01 ASSESSMENT — PAIN SCALES - WONG BAKER
WONGBAKER_NUMERICALRESPONSE: 0
WONGBAKER_NUMERICALRESPONSE: 0

## 2022-01-01 ASSESSMENT — PAIN DESCRIPTION - ONSET
ONSET: GRADUAL
ONSET: ON-GOING
ONSET: GRADUAL

## 2022-01-01 ASSESSMENT — PAIN DESCRIPTION - ORIENTATION
ORIENTATION: RIGHT
ORIENTATION: UPPER
ORIENTATION: MID
ORIENTATION: DISTAL
ORIENTATION: MID;DISTAL
ORIENTATION: OTHER (COMMENT)

## 2022-01-01 ASSESSMENT — ENCOUNTER SYMPTOMS
ABDOMINAL PAIN: 0
VOMITING: 0
NAUSEA: 0
BACK PAIN: 0
SHORTNESS OF BREATH: 0

## 2022-01-01 ASSESSMENT — PAIN DESCRIPTION - LOCATION
LOCATION: NECK
LOCATION: BACK
LOCATION: NECK
LOCATION: INCISION;KNEE
LOCATION: HEAD;NECK;SHOULDER
LOCATION: NECK
LOCATION: GENERALIZED
LOCATION: BACK
LOCATION: BACK

## 2022-01-01 ASSESSMENT — PAIN DESCRIPTION - FREQUENCY
FREQUENCY: INTERMITTENT
FREQUENCY: CONTINUOUS
FREQUENCY: INTERMITTENT

## 2022-01-01 ASSESSMENT — PAIN - FUNCTIONAL ASSESSMENT
PAIN_FUNCTIONAL_ASSESSMENT: PREVENTS OR INTERFERES SOME ACTIVE ACTIVITIES AND ADLS
PAIN_FUNCTIONAL_ASSESSMENT: 0-10
PAIN_FUNCTIONAL_ASSESSMENT: PREVENTS OR INTERFERES SOME ACTIVE ACTIVITIES AND ADLS

## 2022-04-19 PROBLEM — Z87.81 HISTORY OF CERVICAL FRACTURE: Status: ACTIVE | Noted: 2022-01-01

## 2022-04-19 NOTE — ED NOTES
Report called to Gaby Webster, RN at [address] [facility].      Vielka Monk RN  04/19/22 0541

## 2022-04-19 NOTE — H&P
TRAUMA HISTORY AND PHYSICAL EXAMINATION    PATIENT NAME: Antonia Webster  YOB: 1955  MEDICAL RECORD NO. [id number]   DATE: 4/19/2022  PRIMARY CARE PHYSICIAN: Davy oPllard MD  PATIENT EVALUATED AT THE REQUEST OF : Stephanie Sessions    ACTIVATION   []Trauma Alert     [] Trauma Priority     [x]Trauma Consult     IMPRESSION:     Patient Active Problem List   Diagnosis    Clostridium difficile diarrhea    Chronic back pain    Insomnia    Moderate persistent asthma with acute exacerbation    Mixed hyperlipidemia    Acute bronchitis    Adverse drug reaction    Liver disease, chronic, due to alcohol ([facility] [address]    COPD (chronic obstructive pulmonary disease) with chronic bronchitis (HCC)    Obstructive sleep apnea    Paroxysmal SVT (supraventricular tachycardia) (HCC)    Mild persistent asthma without complication    Dysphagia    Candida esophagitis [facility])       MEDICAL DECISION MAKING AND PLAN:     Fall down 13 steps  76 yo M with C6-C7 fracture  - Admit to TICU  - CT C spine - acute fractures noted through the right C6 inferior articular process and right C7 superior articular process  - CT head - age indeterminate 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  - CT TLS spine, A/P - thoracic aortic ectasis 4.5 cm, diffuse severe hepatic steatosis, diverticulosis, jejunal intussusception in left mid abdomen likely transient, small fat containing umbilical hernia  - Xray right shoulder negative for acute osseus abnormality  - Aspen collar in place at all times  - Neurosurgery consult  - EtOH 241  - BHB 4.55  - CK 1332    CONSULT SERVICES    [x] Neurosurgery     [] Orthopedic Surgery    [] Cardiothoracic     [] Facial Trauma    [] Plastic Surgery (Burn)    [] Pediatric Surgery     [] Internal Medicine    [] Pulmonary Medicine    [] Other:      HISTORY:     SOURCE OF INFORMATION  Patient information was obtained from patient. History/Exam limitations: none.     INJURY SUMMARY  Acute fracture right C6 inferior articular process and right C7 superior articular process    GENERAL DATA  Age 75 y.o.  male   Patient information was obtained from patient. History/Exam limitations: none.   Patient presented to the Emergency Department by ambulance where the patient received cervical collar and back boarded prior to arrival.  Injury Date: 4/18/2022  Approximate Injury Time: 11:00 PM        Transport mode:   [x]Ambulance      [] Helicopter     []Car       [] Other  Referring Hospital: [address], (e.g., home, farm, industry, street)  Specific Details of Location (e.g., bedroom, kitchen, garage): Home stairs  Type of Residence (if occurred in home setting) (e.g., apartment, mobile home, single family home): Single family home    MECHANISM OF INJURY    [] Motor Vehicle Collision   Specific vehicle type involved (e.g., sedan, minivan, SUV, pickup truck):   Collision with (e.g., type of vehicle, building, barn, ditch, tree):     Type of collision  [] Single Vehicle Collision  []Multiple Vehicle Collision  [] unknown collision type    Mechanism considerations  [] Fatality in Same Vehicle      []Ejected       []Rollover          []Extricated    Internal Compartment   []                      []Passenger:      []Front Seat        []Rear Seat     Personal Restraints  [] Unrestrained   []Lap Belt Only Restrained   [] Shoulder Belt Only Restrained  [] 3 Point Restrained  [] unknown     Air Bags  [] Front Air Bag  []Side Air Bag  []Curtain Airbag [][employer] Not Deployed        Pediatric Consideration:      [] Booster Seat  []Infant Car Seat  [] Child Car Seat      [] Motorcycle Collision   Wearing Helmet     []Yes     []No    []Unknown    [] Bicycle Collision Wearing Helmet     []Yes     []No    []Unknown    [] Pedestrian Struck         [x] Fall    []From Standing     []From Height  Ft     [x]Down Stairs _15__steps    [] Assault    [] Gunshot  Specify caliber / type of gun: ____________________________    [] Stabbing  Specify weapon type, size: _____________________________    [] Burn  []Flame   []Scald   []Electrical   []Chemical  []Inhalation   []House fire    [] Other ______________________________________________________    [] Other protective devices used / worn ___________________________    HISTORY:     Channing Gandhi is a 75 y.o. male that presented to the Emergency Department following a fall down approximately 15 steps with LOC. Patient does not recall the incident leading to his arrival. He states he was down overnight and called EMS this morning. He endorses drinking alcohol, states he drinks about 1/5 of vodka in a week. He is endorsing pain in his neck but denying pain, numbness, tingling, or weakness elsewhere. Loss of Consciousness []No   [x]Yes Duration(min)  Unknown     [] Unknown     Total Fluids Given Prior To Arrival  cc    MEDICATIONS:   []  None     []  Information not available due to exam limitations documented above  Prior to Admission medications    Medication Sig Start Date End Date Taking?  Authorizing Provider   traZODone (DESYREL) 150 MG tablet take 1 tablet by mouth at bedtime 4/12/22   Jack Camilo MD   pantoprazole (PROTONIX) 40 MG tablet Take 1 tablet by mouth daily 2/22/22   Bree Ga MD   pantoprazole (PROTONIX) 40 MG tablet take 1 tablet by mouth every morning before breakfast 2/21/22   Matilde Winslow MD   atorvastatin (LIPITOR) 20 MG tablet Take 1 tablet by mouth daily 2/21/22   Matilde Winslow MD   albuterol sulfate  (90 Base) MCG/ACT inhaler inhale 1 puff by mouth and INTO THE LUNGS every 4 hours if needed for wheezing 2/1/22   Jack Camilo MD   montelukast (SINGULAIR) 10 MG tablet Take 1 tablet by mouth nightly 1/5/22   MD AUGUSTUS Sanchez ELLIPTA 200-25 MCG/INH AEPB inhaler Inhale 1 puff into the lungs daily 1/5/22   Jack Camilo MD   azelastine (ASTELIN) 0.1 % nasal spray 1 spray by Nasal route 2 times daily Use in each nostril as directed 1/5/22   Marialuisa Reed MD   clobetasol (TEMOVATE) 0.05 % ointment Apply topically 2 times daily. 8/5/21   Marialuisa Reed MD   clindamycin (CLEOCIN) 150 MG capsule take 1 capsule by mouth three times a day  Patient not taking: Reported on 8/5/2021 5/21/21   Historical Provider, MD   ibuprofen (ADVIL;MOTRIN) 800 MG tablet take 1 tablet by mouth three times a day  Patient not taking: Reported on 8/5/2021 5/21/21   Historical Provider, MD   penicillin v potassium (VEETID) 500 MG tablet take 1 tablet by mouth three times a day  Patient not taking: Reported on 8/5/2021 5/1/21   Historical Provider, MD   PREVIDENT 5000 ENAMEL PROTECT 1.1-5 % PSTE APPLY TO TEETH,SWISH FOR 30 SECONDS THEN EXPECTORATE ONCE DAILY DO NOT EAT OR DRINK FOR 30 MINUTES 6/6/21   Historical Provider, MD   nystatin (MYCOSTATIN) 863159 UNIT/ML suspension Take 5 mLs by mouth 4 times daily 9/19/18   Megan Median, MD   ibuprofen (ADVIL;MOTRIN) 400 MG tablet Take 400 mg by mouth every 6 hours as needed for Pain. Historical Provider, MD       ALLERGIES:   []  None    []   Information not available due to exam limitations documented above   Seasonal and Cat hair extract    PAST MEDICAL HISTORY: []  None   []   Information not available due to exam limitations documented above    has a past medical history of Asthma, Calculus of gallbladder without mention of cholecystitis or obstruction, Chronic back pain, Hyperlipidemia, Insomnia, unspecified, Spinal stenosis, unspecified region other than cervical, and Urinary incontinence. has a past surgical history that includes Tonsillectomy; Colonoscopy; pr colonoscopy flx dx w/collj spec when pfrmd (N/A, 8/29/2018); and Upper gastrointestinal endoscopy (8/29/2018). FAMILY HISTORY   []   Information not available due to exam limitations documented above    family history is not on file.    No relevant family history per patient    SOCIAL HISTORY  []   Information not available due to exam limitations documented above     reports that he quit smoking about 14 years ago. His smoking use included cigarettes. He has a 24.00 pack-year smoking history. He has never used smokeless tobacco.   reports current alcohol use. reports no history of drug use. PERTINENT SYSTEMIC REVIEW:    []   Information not available due to exam limitations documented above    ROS:    GENERAL: Denies fever, chills  HEENT: Neck pain, Denies Headache, eye pain, visual disturbance, hearing disturbance, epistaxis, difficulty swallowing  RESP: Denies SOB, cough, wheezing, chest tightness  CARD: Denies chest pain, palpitations, fluttering  GI: Denies abd pain, N/V, hematemesis, diarrhea, constipation, hematochezia, bowel incontinence  : Denies Dysuria, hematuria, difficulty urinating, urinary incontinence  NEURO: Denies dizziness, light-headedness, weakness, numbness, tingling  MSK: muscle weakness, joint swelling  ENDOCRINE: Denies h/o DM  HEME: Denies h/o coagulopathy d/o, easy bruising, easy bleeding  ALL/IMMUNO: Denies anaphylaxis      PHYSICAL EXAMINATION:     GLASCOW COMA SCALE  NEUROMUSCULAR BLOCKADE PRIOR TO ARRIVAL     [x]No        []Yes      Variable  Score   Variable  Score  Eye opening [x]Spontaneous 4 Verbal  [x]Oriented  5     []To voice  3   []Confused  4    []To pain  2   []Inapp words  3    []None  1   []Incomp words 2       []None  1   Motor   [x]Obeys  6    []Localizes pain 5    []Withdraws(pain) 4    []Flexion(pain) 3  []Extension(pain) 2    []None  1     GCS Total = 15    PHYSICAL EXAMINATION    VITAL SIGNS:   Vitals:    04/19/22 0732   Temp: 98.1 °F (36.7 °C)       General Appearance: alert and oriented to person, place and time  Skin: ecchymoses noted on left forearm, abrasions to lateral RLE and right knee, LLE and left knee  Head: normocephalic and atraumatic.  Aspen collar in place  Eyes: pupils equal, round, and reactive to light, extraocular eye movements intact, conjunctivae normal  ENT: tympanic membrane, external ear and ear canal normal bilaterally, oropharynx clear and moist with normal mucous membranes  Neck: neck supple and non tender without mass   Pulmonary/Chest: clear to auscultation bilaterally- no wheezes, rales or rhonchi, normal air movement, no respiratory distress  Cardiovascular: normal rate, normal S1 and S2 and intact distal pulses  Abdomen: soft, non-tender, non-distended, normal bowel sounds, no masses or organomegaly  Genitourinary: genitals normal without hernia or inguinal adenopathy  Extremities: ecchymosis to left forearm.  No tenderness to palpation or obvious deformity of all extremities  Musculoskeletal: normal range of motion, no joint swelling, deformity or tenderness  Neurologic: gait and coordination normal and speech normal         RADIOLOGY    PLAIN FILMS  Ordered Findings  []CHEST []Normal   [] Preliminary findings: Results Pending   []PELVIS  []Normal   [] Preliminary findings: Results Pending  EXTREMITIES      []RUE []Normal   [] Preliminary findings: Results Pending     [x]LUE  []Normal   [] Preliminary findings: Results Pending     []RLE []Normal   [] Preliminary findings: Results Pending     []LLE  []Normal   [] Preliminary findings: Results Pending   []OTHER []Normal   [] Preliminary findings: Results Pending     CT SCANS  Ordered  [x]HEAD  []Normal   [] Preliminary findings: Results Pending   [x]CHEST  []Normal   [] Preliminary findings: Results Pending   [x]ABD/PELVIS[]Normal  [] Preliminary findings: Results Pending  []FACE []Normal   [] Preliminary findings:   [x]C-SPINE  []Normal   [] Preliminary findings: Results Pending   [x]T-SPINE  []Normal   [] Preliminary findings: Results Pending   [x]L-SPINE  []Normal   [] Preliminary findings: Results Pending     [facility]  []Normal     [] Preliminary findings:   []OTHER   []Normal     [] Preliminary findings:     XR SHOULDER RIGHT (MIN 2 VIEWS)    Result Date: 4/19/2022  EXAMINATION: THREE XRAY VIEWS OF THE RIGHT SHOULDER 4/19/2022 7:43 am COMPARISON: None. HISTORY: ORDERING SYSTEM PROVIDED HISTORY: Fall TECHNOLOGIST PROVIDED HISTORY: Fall 18-year-old male with history of fall FINDINGS: Moderate degenerative changes of the right glenohumeral joint. Mild degenerative change of the right AC joint. Visualized right-sided ribs appear intact. No acute fracture or dislocation. 1. Moderate right glenohumeral osteoarthrosis. 2. Mild degenerative changes of the right AC joint. No acute fracture or dislocation. CT HEAD WO CONTRAST    Result Date: 4/19/2022  EXAMINATION: CT OF THE HEAD WITHOUT CONTRAST  4/19/2022 4:50 am TECHNIQUE: CT of the head was performed without the administration of intravenous contrast. Dose modulation, iterative reconstruction, and/or weight based adjustment of the mA/kV was utilized to reduce the radiation dose to as low as reasonably achievable. COMPARISON: 06/22/2014 HISTORY: ORDERING SYSTEM PROVIDED HISTORY: fall etoh TECHNOLOGIST PROVIDED HISTORY: fall etoh Decision Support Exception - unselect if not a suspected or confirmed emergency medical condition->Emergency Medical Condition ([place]) Reason for Exam: fall etoh Additional signs and symptoms: PT STATES HE FELL DOWN 16 STEPS FINDINGS: BRAIN/VENTRICLES: The cerebral and cerebellar parenchyma demonstrate volume loss. Scattered low-attenuation areas are noted supratentorially, compatible with chronic microvascular white matter ischemic disease. There are no areas of hemorrhage, mass, or midline shift. No abnormal extra-axial fluid collections. The ventricles are prominent in size, likely related to involutional change, stable. Gray-white differentiation is maintained without evidence of an acute infarct. The there is a probable small chronic infarct involving the inferior right cerebellar hemisphere. ORBITS: The visualized portion of the orbits demonstrate no acute abnormality.  SINUSES: The paranasal sinuses and mastoid air cells are well aerated. SOFT TISSUES/SKULL:  No acute abnormality of the visualized skull or soft tissues. 1. No acute intracranial abnormality. 2. Cerebral parenchymal volume loss with chronic microvascular white matter ischemic disease. 3. 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 CT CERVICAL SPINE WO CONTRAST    Result Date: 4/19/2022  EXAMINATION: CT OF THE CERVICAL SPINE WITHOUT CONTRAST 4/19/2022 4:50 am TECHNIQUE: CT of the cervical spine was performed without the administration of intravenous contrast. Multiplanar reformatted images are provided for review. Dose modulation, iterative reconstruction, and/or weight based adjustment of the mA/kV was utilized to reduce the radiation dose to as low as reasonably achievable. COMPARISON: None. HISTORY: ORDERING SYSTEM PROVIDED HISTORY: fall EtOH TECHNOLOGIST PROVIDED HISTORY: fall EtOH Decision Support Exception - unselect if not a suspected or confirmed emergency medical condition->Emergency Medical Condition (MA) Reason for Exam: fall EtOH Additional signs and symptoms: PT STATES HE FELL DOWN 16 STEPS FINDINGS: BONES/ALIGNMENT: There is normal alignment of the cervical spine. There is an acute fracture noted through the right C6 inferior articular process and right C7 superior articular process. No other fractures are identified. DEGENERATIVE CHANGES: There is multilevel degenerative disc disease noted in the cervical spine, greatest at C4-C5 through C6-C7. There is asymmetric right-sided facet arthropathy at C3-C4 with fusion across the facet joint. Asymmetric right-sided foraminal narrowing is noted at C3-C4. SOFT TISSUES: There is paraseptal emphysema and centrilobular emphysema. The thyroid gland is unremarkable. 1. Acute fractures noted through the right C6 inferior articular process and right C7 superior articular process. 2. No other fractures are identified.      CT THORACIC SPINE WO CONTRAST    Result Date: 4/19/2022  EXAMINATION: CT OF THE CHEST, ABDOMEN, AND PELVIS WITH CONTRAST; CT OF THE THORACIC SPINE WITHOUT CONTRAST; CT OF THE LUMBAR SPINE WITHOUT CONTRAST, 4/19/2022 5:20 am; 4/19/2022 4:50 am TECHNIQUE: CT of the chest, abdomen and pelvis was performed with the administration of intravenous contrast. Multiplanar reformatted images are provided for review. Dose modulation, iterative reconstruction, and/or weight based adjustment of the mA/kV was utilized to reduce the radiation dose to as low as reasonably achievable.; CT of the thoracic spine was performed without the administration of intravenous contrast. Multiplanar reformatted images are provided for review. Dose modulation, iterative reconstruction, and/or weight based adjustment of the mA/kV was utilized to reduce the radiation dose to as low as reasonably achievable.; CT of the lumbar spine was performed without the administration of intravenous contrast. Multiplanar reformatted images are provided for review. Adjustment of mA and/or kV according to patient size was utilized. Dose modulation, iterative reconstruction, and/or weight based adjustment of the mA/kV was utilized to reduce the radiation dose to as low as reasonably achievable. COMPARISON: None.  HISTORY: ORDERING SYSTEM PROVIDED HISTORY: fall etoh TECHNOLOGIST PROVIDED HISTORY: fall etoh Decision Support Exception - unselect if not a suspected or confirmed emergency medical condition->Emergency Medical Condition (MA) Reason for Exam: fall, etoh Additional signs and symptoms: PT STATES HE FELL DOWN 16 STEPS; ORDERING SYSTEM PROVIDED HISTORY: fall etoh TECHNOLOGIST PROVIDED HISTORY: fall etoh Reason for Exam: fall, etoh, pain Additional signs and symptoms: PT STATES HE FELL DOWN 16 STEPS; ORDERING SYSTEM PROVIDED HISTORY: fall etoh TECHNOLOGIST PROVIDED HISTORY: fall etoh Decision Support Exception - unselect if not a suspected or confirmed emergency medical condition->Emergency Medical Condition (MA) Reason for Exam: fal, etoh Additional signs and symptoms: PT STATES HE FELL DOWN 16 STEPS FINDINGS: CTA CHEST: Thoracic aorta: No thoracic aortic aneurysm is identified. Thoracic aortic ectasia measuring 4.5 cm. No great vessel stenosis is identified. Pulmonary arteries: Central pulmonary arteries appear grossly unremarkable, with normal size and no definite emboli though poorly evaluated given the degree of contrast opacification. Mediastinum: No mediastinal hematoma is identified. Coronary artery atherosclerosis. Minimal fluid in the pericardial recesses. No mediastinal lymphadenopathy is identified. No focal esophageal thickening. Thyroid gland appears small but otherwise unremarkable. Lungs: No pleural effusion is identified. Respiratory motion artifact. Subsegmental atelectasis. No spiculated lung mass. Minimal secretions noted within the trachea and bronchi. No traumatic laceration or pneumothorax. Chest wall/osseous structures: No axillary or supraclavicular lymphadenopathy is identified. No rib fracture is identified. No osseous destructive process is identified. No acute thoracic spine vertebral body fracture. CTA ABDOMEN: Organs: Diffuse severe hepatic steatosis. No splenic mass is identified. The adrenal glands, pancreas, gallbladder, and kidneys show no traumatic injury or neoplastic process. No inflammatory change. GI/bowel: Normal appendix. No ileus or obstruction. Colonic diverticulosis is identified. A jejunal intussusception is seen in the left mid abdomen, likely a transient finding without significant proximal obstruction. Peritoneum/retroperitoneum: No aortic aneurysm. No dissection. No mesenteric artery injury is identified. No retroperitoneal or mesenteric lymphadenopathy is identified. Tortuous abdominal aorta. Fat containing umbilical hernia. CTA PELVIS: Vascular: The iliac vasculature appears intact.   No hemorrhage is identified. No acute injury. Nonvascular: The bladder appears unremarkable. The plastic gland appears grossly unremarkable as well. No free fluid or pelvic lymphadenopathy is identified. Soft tissue/osseous structures: Spine report below. Sacroiliac joints appear intact. No pelvic fracture is identified. The proximal femurs appear intact. THORACIC/LUMBAR SPINE: Thoracic spine: No thoracic spine vertebral body fracture. Multilevel mild-to-moderate degenerative disc disease. Posterior vertebral body alignment appears intact. No osseous erosive changes are identified. Transverse processes appear intact. Lumbar spine: Multilevel degenerative disc disease seen in the lumbar spine, greatest at the level of L2-L3 and L3-L4, with minimal retrolisthesis of L3 on L4 with a small degree of anterior spinal canal narrowing. Mild narrowing at the L5-S1 level, with bilateral pars defects of L5 though no significant L5 spondylolisthesis. 1. No acute osseous fracture identified within the chest, abdomen or pelvis. The thoracic and lumbar spine appear intact, with multilevel degenerative changes though no acute compression fracture. 2. Thoracic aortic ectasia measuring 4.5 cm. 3. Diffuse atherosclerotic disease including coronary artery involvement. 4. No acute parenchymal process seen within the lungs. Minimal secretions noted within the central airways. 5. Diffuse severe hepatic steatosis. 6. Diverticulosis. 7. Jejunal intussusception seen in the left mid abdomen, likely a transient finding and often seen on CT imaging studies. 8. Small fat containing umbilical hernia.      CT LUMBAR SPINE WO CONTRAST    Result Date: 4/19/2022  EXAMINATION: CT OF THE CHEST, ABDOMEN, AND PELVIS WITH CONTRAST; CT OF THE THORACIC SPINE WITHOUT CONTRAST; CT OF THE LUMBAR SPINE WITHOUT CONTRAST, 4/19/2022 5:20 am; 4/19/2022 4:50 am TECHNIQUE: CT of the chest, abdomen and pelvis was performed with the administration of intravenous contrast. Multiplanar reformatted images are provided for review. Dose modulation, iterative reconstruction, and/or weight based adjustment of the mA/kV was utilized to reduce the radiation dose to as low as reasonably achievable.; CT of the thoracic spine was performed without the administration of intravenous contrast. Multiplanar reformatted images are provided for review. Dose modulation, iterative reconstruction, and/or weight based adjustment of the mA/kV was utilized to reduce the radiation dose to as low as reasonably achievable.; CT of the lumbar spine was performed without the administration of intravenous contrast. Multiplanar reformatted images are provided for review. Adjustment of mA and/or kV according to patient size was utilized. Dose modulation, iterative reconstruction, and/or weight based adjustment of the mA/kV was utilized to reduce the radiation dose to as low as reasonably achievable. COMPARISON: None. HISTORY: ORDERING SYSTEM PROVIDED HISTORY: fall etoh TECHNOLOGIST PROVIDED HISTORY: fall etoh Decision Support Exception - unselect if not a suspected or confirmed emergency medical condition->Emergency Medical Condition (MA) Reason for Exam: fall, etoh Additional signs and symptoms: PT STATES HE FELL DOWN 16 STEPS; ORDERING SYSTEM PROVIDED HISTORY: fall etoh TECHNOLOGIST PROVIDED HISTORY: fall etoh Reason for Exam: fall, etoh, pain Additional signs and symptoms: PT STATES HE FELL DOWN 16 STEPS; ORDERING SYSTEM PROVIDED HISTORY: fall etoh TECHNOLOGIST PROVIDED HISTORY: fall etoh Decision Support Exception - unselect if not a suspected or confirmed emergency medical condition->Emergency Medical Condition (MA) Reason for Exam: fal, etoh Additional signs and symptoms: PT STATES HE FELL DOWN 16 STEPS FINDINGS: CTA CHEST: Thoracic aorta: No thoracic aortic aneurysm is identified. Thoracic aortic ectasia measuring 4.5 cm. No great vessel stenosis is identified.  Pulmonary arteries: Central pulmonary arteries appear grossly unremarkable, with normal size and no definite emboli though poorly evaluated given the degree of contrast opacification. Mediastinum: No mediastinal hematoma is identified. Coronary artery atherosclerosis. Minimal fluid in the pericardial recesses. No mediastinal lymphadenopathy is identified. No focal esophageal thickening. Thyroid gland appears small but otherwise unremarkable. Lungs: No pleural effusion is identified. Respiratory motion artifact. Subsegmental atelectasis. No spiculated lung mass. Minimal secretions noted within the trachea and bronchi. No traumatic laceration or pneumothorax. Chest wall/osseous structures: No axillary or supraclavicular lymphadenopathy is identified. No rib fracture is identified. No osseous destructive process is identified. No acute thoracic spine vertebral body fracture. CTA ABDOMEN: Organs: Diffuse severe hepatic steatosis. No splenic mass is identified. The adrenal glands, pancreas, gallbladder, and kidneys show no traumatic injury or neoplastic process. No inflammatory change. GI/bowel: Normal appendix. No ileus or obstruction. Colonic diverticulosis is identified. A jejunal intussusception is seen in the left mid abdomen, likely a transient finding without significant proximal obstruction. Peritoneum/retroperitoneum: No aortic aneurysm. No dissection. No mesenteric artery injury is identified. No retroperitoneal or mesenteric lymphadenopathy is identified. Tortuous abdominal aorta. Fat containing umbilical hernia. CTA PELVIS: Vascular: The iliac vasculature appears intact. No hemorrhage is identified. No acute injury. Nonvascular: The bladder appears unremarkable. The plastic gland appears grossly unremarkable as well. No free fluid or pelvic lymphadenopathy is identified. Soft tissue/osseous structures: Spine report below. Sacroiliac joints appear intact. No pelvic fracture is identified.   The proximal femurs appear intact. THORACIC/LUMBAR SPINE: Thoracic spine: No thoracic spine vertebral body fracture. Multilevel mild-to-moderate degenerative disc disease. Posterior vertebral body alignment appears intact. No osseous erosive changes are identified. Transverse processes appear intact. Lumbar spine: Multilevel degenerative disc disease seen in the lumbar spine, greatest at the level of L2-L3 and L3-L4, with minimal retrolisthesis of L3 on L4 with a small degree of anterior spinal canal narrowing. Mild narrowing at the L5-S1 level, with bilateral pars defects of L5 though no significant L5 spondylolisthesis. 1. No acute osseous fracture identified within the chest, abdomen or pelvis. The thoracic and lumbar spine appear intact, with multilevel degenerative changes though no acute compression fracture. 2. Thoracic aortic ectasia measuring 4.5 cm. 3. Diffuse atherosclerotic disease including coronary artery involvement. 4. No acute parenchymal process seen within the lungs. Minimal secretions noted within the central airways. 5. Diffuse severe hepatic steatosis. 6. Diverticulosis. 7. Jejunal intussusception seen in the left mid abdomen, likely a transient finding and often seen on CT imaging studies. 8. Small fat containing umbilical hernia. CT CHEST ABDOMEN PELVIS W CONTRAST    Result Date: 4/19/2022  EXAMINATION: CT OF THE CHEST, ABDOMEN, AND PELVIS WITH CONTRAST; CT OF THE THORACIC SPINE WITHOUT CONTRAST; CT OF THE LUMBAR SPINE WITHOUT CONTRAST, 4/19/2022 5:20 am; 4/19/2022 4:50 am TECHNIQUE: CT of the chest, abdomen and pelvis was performed with the administration of intravenous contrast. Multiplanar reformatted images are provided for review.  Dose modulation, iterative reconstruction, and/or weight based adjustment of the mA/kV was utilized to reduce the radiation dose to as low as reasonably achievable.; CT of the thoracic spine was performed without the administration of intravenous contrast. Multiplanar reformatted images are provided for review. Dose modulation, iterative reconstruction, and/or weight based adjustment of the mA/kV was utilized to reduce the radiation dose to as low as reasonably achievable.; CT of the lumbar spine was performed without the administration of intravenous contrast. Multiplanar reformatted images are provided for review. Adjustment of mA and/or kV according to patient size was utilized. Dose modulation, iterative reconstruction, and/or weight based adjustment of the mA/kV was utilized to reduce the radiation dose to as low as reasonably achievable. COMPARISON: None. HISTORY: ORDERING SYSTEM PROVIDED HISTORY: fall etoh TECHNOLOGIST PROVIDED HISTORY: fall etoh Decision Support Exception - unselect if not a suspected or confirmed emergency medical condition->Emergency Medical Condition (MA) Reason for Exam: fall, etoh Additional signs and symptoms: PT STATES HE FELL DOWN 16 STEPS; ORDERING SYSTEM PROVIDED HISTORY: fall etoh TECHNOLOGIST PROVIDED HISTORY: fall etoh Reason for Exam: fall, etoh, pain Additional signs and symptoms: PT STATES HE FELL DOWN 16 STEPS; ORDERING SYSTEM PROVIDED HISTORY: fall etoh TECHNOLOGIST PROVIDED HISTORY: fall etoh Decision Support Exception - unselect if not a suspected or confirmed emergency medical condition->Emergency Medical Condition (MA) Reason for Exam: fal, etoh Additional signs and symptoms: PT STATES HE FELL DOWN 16 STEPS FINDINGS: CTA CHEST: Thoracic aorta: No thoracic aortic aneurysm is identified. Thoracic aortic ectasia measuring 4.5 cm. No great vessel stenosis is identified. Pulmonary arteries: Central pulmonary arteries appear grossly unremarkable, with normal size and no definite emboli though poorly evaluated given the degree of contrast opacification. Mediastinum: No mediastinal hematoma is identified. Coronary artery atherosclerosis. Minimal fluid in the pericardial recesses. No mediastinal lymphadenopathy is identified.   No focal esophageal thickening. Thyroid gland appears small but otherwise unremarkable. Lungs: No pleural effusion is identified. Respiratory motion artifact. Subsegmental atelectasis. No spiculated lung mass. Minimal secretions noted within the trachea and bronchi. No traumatic laceration or pneumothorax. Chest wall/osseous structures: No axillary or supraclavicular lymphadenopathy is identified. No rib fracture is identified. No osseous destructive process is identified. No acute thoracic spine vertebral body fracture. CTA ABDOMEN: Organs: Diffuse severe hepatic steatosis. No splenic mass is identified. The adrenal glands, pancreas, gallbladder, and kidneys show no traumatic injury or neoplastic process. No inflammatory change. GI/bowel: Normal appendix. No ileus or obstruction. Colonic diverticulosis is identified. A jejunal intussusception is seen in the left mid abdomen, likely a transient finding without significant proximal obstruction. Peritoneum/retroperitoneum: No aortic aneurysm. No dissection. No mesenteric artery injury is identified. No retroperitoneal or mesenteric lymphadenopathy is identified. Tortuous abdominal aorta. Fat containing umbilical hernia. CTA PELVIS: Vascular: The iliac vasculature appears intact. No hemorrhage is identified. No acute injury. Nonvascular: The bladder appears unremarkable. The plastic gland appears grossly unremarkable as well. No free fluid or pelvic lymphadenopathy is identified. Soft tissue/osseous structures: Spine report below. Sacroiliac joints appear intact. No pelvic fracture is identified. The proximal femurs appear intact. THORACIC/LUMBAR SPINE: Thoracic spine: No thoracic spine vertebral body fracture. Multilevel mild-to-moderate degenerative disc disease. Posterior vertebral body alignment appears intact. No osseous erosive changes are identified. Transverse processes appear intact.  Lumbar spine: Multilevel degenerative disc disease seen in the lumbar spine, greatest at the level of L2-L3 and L3-L4, with minimal retrolisthesis of L3 on L4 with a small degree of anterior spinal canal narrowing. Mild narrowing at the L5-S1 level, with bilateral pars defects of L5 though no significant L5 spondylolisthesis. 1. No acute osseous fracture identified within the chest, abdomen or pelvis. The thoracic and lumbar spine appear intact, with multilevel degenerative changes though no acute compression fracture. 2. Thoracic aortic ectasia measuring 4.5 cm. 3. Diffuse atherosclerotic disease including coronary artery involvement. 4. No acute parenchymal process seen within the lungs. Minimal secretions noted within the central airways. 5. Diffuse severe hepatic steatosis. 6. Diverticulosis. 7. Jejunal intussusception seen in the left mid abdomen, likely a transient finding and often seen on CT imaging studies. 8. Small fat containing umbilical hernia.        LABS    Labs Reviewed   TROPONIN   TROPONIN   BRAIN NATRIURETIC PEPTIDE       Martin Mittal MD  PGY 1  Resident Physician Emergency Medicine  Trauma and General Surgery Service  04/19/22 8:09 AM

## 2022-04-19 NOTE — ED NOTES
Upon further questioning, patient believes he fell sometime yesterday afternoon/evening and may have laid on the floor for several hours.      Yovanny Mejia RN  04/19/22 [id number]

## 2022-04-19 NOTE — ANESTHESIA PRE PROCEDURE
Department of Anesthesiology  Preprocedure Note       Name:  Nell Bustos   Age:  75 y.o.  :  1955                                          MRN:  [id number]         Date:  2022      Surgeon: Deena Cleary):  Wilner Solis DO    Procedure: Procedure(s):  C6-7 ACDF    Medications prior to admission:   Prior to Admission medications    Medication Sig Start Date End Date Taking? Authorizing Provider   traZODone (DESYREL) 150 MG tablet take 1 tablet by mouth at bedtime 22   George Gipson MD   pantoprazole (PROTONIX) 40 MG tablet Take 1 tablet by mouth daily 22   Franciso Schaumann, MD   pantoprazole (PROTONIX) 40 MG tablet take 1 tablet by mouth every morning before breakfast 22   José Miguel Gaspar MD   atorvastatin (LIPITOR) 20 MG tablet Take 1 tablet by mouth daily 22   José Miguel Gaspar MD   albuterol sulfate  (90 Base) MCG/ACT inhaler inhale 1 puff by mouth and INTO THE LUNGS every 4 hours if needed for wheezing 22   George Gipson MD   montelukast (SINGULAIR) 10 MG tablet Take 1 tablet by mouth nightly 22   George Gipson MD   BREO ELLIPTA 200-25 MCG/INH AEPB inhaler Inhale 1 puff into the lungs daily 22   George Gipson MD   azelastine (ASTELIN) 0.1 % nasal spray 1 spray by Nasal route 2 times daily Use in each nostril as directed 22   George Gipson MD   clobetasol (TEMOVATE) 0.05 % ointment Apply topically 2 times daily.  21   George Gipson MD   clindamycin (CLEOCIN) 150 MG capsule take 1 capsule by mouth three times a day  Patient not taking: Reported on 2021   Historical Provider, MD   ibuprofen (ADVIL;MOTRIN) 800 MG tablet take 1 tablet by mouth three times a day  Patient not taking: Reported on 2021   Historical Provider, MD   penicillin v potassium (VEETID) 500 MG tablet take 1 tablet by mouth three times a day  Patient not taking: Reported on 2021   Historical Provider, MD   PREVIDENT 5000 ENAMEL PROTECT 1.1-5 % PSTE APPLY TO TEETH,SWISH FOR 30 SECONDS THEN EXPECTORATE ONCE DAILY DO NOT EAT OR DRINK FOR 30 MINUTES 6/6/21   Historical Provider, MD   nystatin (MYCOSTATIN) 236768 UNIT/ML suspension Take 5 mLs by mouth 4 times daily 9/19/18   Robby Mcgovern MD   ibuprofen (ADVIL;MOTRIN) 400 MG tablet Take 400 mg by mouth every 6 hours as needed for Pain.     Historical Provider, MD       Current medications:    Current Facility-Administered Medications   Medication Dose Route Frequency Provider Last Rate Last Admin    sodium chloride flush 0.9 % injection 5-40 mL  5-40 mL IntraVENous PRN Gia Body, DO        ondansetron (ZOFRAN-ODT) disintegrating tablet 4 mg  4 mg Oral Q8H PRN Gia Body, DO        Or    [facility] 4 mg  4 mg IntraVENous Q6H PRN Gia Body, DO   4 mg at 04/19/22 1043    lactated ringers infusion   IntraVENous Continuous Gia Body,  mL/hr at 04/19/22 1000 New Bag at 04/19/22 1000    thiamine (B-1) 500 mg in sodium chloride 0.9 % 100 mL IVPB  500 mg IntraVENous Q8H Tanvira Jovanny, APRN - CNP        ondansetron (ZOFRAN) injection 4 mg  4 mg IntraVENous Once Sandralee Ates, DO        propofol injection  5-50 mcg/kg/min IntraVENous Continuous Freeda Jovanny, APRN - CNP        diazePAM (VALIUM) tablet 10 mg  10 mg Oral Q6H Freeda Jovanny, APRN - CNP        midazolam (VERSED) 1 mg/mL in D5W infusion  1-10 mg/hr IntraVENous Continuous Tanvira Jovanny, APRN - CNP        fentaNYL 20 mcg/mL Infusion   mcg/hr IntraVENous Continuous Freeda Jovanny, APRN - CNP 2.5 mL/hr at 04/19/22 1129 50 mcg/hr at 04/19/22 1129    fentaNYL (SUBLIMAZE) injection 100 mcg  100 mcg IntraVENous Q1H PRN Tanvira Jovanny, APRN - CNP        atorvastatin (LIPITOR) tablet 20 mg  20 mg Oral Nightly E Arya Wiley MD        pantoprazole (PROTONIX) tablet 40 mg  40 mg Oral Daily E Arya Wiley MD         Current Outpatient Medications   Medication Sig Dispense Refill    traZODone (DESYREL) 150 MG tablet take 1 tablet by mouth at bedtime 90 tablet 0    pantoprazole (PROTONIX) 40 MG tablet Take 1 tablet by mouth daily 30 tablet 5    pantoprazole (PROTONIX) 40 MG tablet take 1 tablet by mouth every morning before breakfast 90 tablet 5    atorvastatin (LIPITOR) 20 MG tablet Take 1 tablet by mouth daily 90 tablet 3    albuterol sulfate  (90 Base) MCG/ACT inhaler inhale 1 puff by mouth and INTO THE LUNGS every 4 hours if needed for wheezing 25.5 g 3    montelukast (SINGULAIR) 10 MG tablet Take 1 tablet by mouth nightly 30 tablet 2    BREO ELLIPTA 200-25 MCG/INH AEPB inhaler Inhale 1 puff into the lungs daily 60 each 1    azelastine (ASTELIN) 0.1 % nasal spray 1 spray by Nasal route 2 times daily Use in each nostril as directed 30 mL 2    clobetasol (TEMOVATE) 0.05 % ointment Apply topically 2 times daily. 100 g 3    clindamycin (CLEOCIN) 150 MG capsule take 1 capsule by mouth three times a day (Patient not taking: Reported on 8/5/2021)      ibuprofen (ADVIL;MOTRIN) 800 MG tablet take 1 tablet by mouth three times a day (Patient not taking: Reported on 8/5/2021)      penicillin v potassium (VEETID) 500 MG tablet take 1 tablet by mouth three times a day (Patient not taking: Reported on 8/5/2021)      PREVIDENT 5000 ENAMEL PROTECT 1.1-5 % PSTE APPLY TO TEETH,SWISH FOR 30 SECONDS THEN EXPECTORATE ONCE DAILY DO NOT EAT OR DRINK FOR 30 MINUTES      nystatin (MYCOSTATIN) 935650 UNIT/ML suspension Take 5 mLs by mouth 4 times daily 2 Bottle 1    ibuprofen (ADVIL;MOTRIN) 400 MG tablet Take 400 mg by mouth every 6 hours as needed for Pain. 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 (HCC) J44.9    Obstructive sleep apnea G47.33    Paroxysmal SVT (supraventricular tachycardia) (HCC) I47.1    Mild persistent asthma without complication W90.11    Dysphagia R13.10    Candida esophagitis (HCC) B37.81    History of cervical fracture Z87.81       Past Medical History:        Diagnosis Date    Asthma     Calculus of gallbladder without mention of cholecystitis or obstruction     Chronic back pain     Hyperlipidemia     Insomnia, unspecified     Spinal stenosis, unspecified region other than cervical     Urinary incontinence        Past Surgical History:        Procedure Laterality Date    COLONOSCOPY      CA COLONOSCOPY FLX DX W/COLLJ SPEC WHEN PFRMD N/A 2018    COLONOSCOPY performed by Chinedu Caldwell MD at [address] [facility]  2018    EGD [facility]SY [facility] [address]       Social History:    Social History     Tobacco Use    Smoking status: Former Smoker     Packs/day: 1.00     Years: 24.00     Pack years: 24.00     Types: Cigarettes     Quit date:      Years since quittin.3    Smokeless tobacco: Never Used   Substance Use Topics    Alcohol use: Yes     Comment: daily drinker (vodka)                                Counseling given: Not Answered      Vital Signs (Current):   Vitals:    22 1150 22 1155 22 1209 22 1225   BP: 114/86 120/85     Pulse: 106 105 112    Resp:  18  18   Temp:       TempSrc:       SpO2: 100% 100%  99%   Weight:       Height:                                                  BP Readings from Last 3 Encounters:   22 120/85   22 (!) 118/91   21 130/78       NPO Status: BMI:   Wt Readings from Last 3 Encounters:   04/19/22 210 lb (95.3 kg)   04/19/22 200 lb (90.7 kg)   08/05/21 233 lb (105.7 kg)     Body mass index is 26.96 kg/m². CBC:   Lab Results   Component Value Date    WBC 7.9 04/19/2022    RBC 3.87 04/19/2022    RBC 4.82 02/09/2012    HGB 11.8 04/19/2022    HCT 36.2 04/19/2022    MCV 93.5 04/19/2022    RDW 22.9 04/19/2022     04/19/2022     02/09/2012       CMP:   Lab Results   Component Value Date     04/19/2022    K 4.2 04/19/2022     04/19/2022    CO2 14 04/19/2022    BUN 6 04/19/2022    CREATININE 0.49 04/19/2022    GFRAA >60 04/19/2022    LABGLOM >60 04/19/2022    GLUCOSE 134 04/19/2022    GLUCOSE 110 02/09/2012    PROT 6.6 04/19/2022    CALCIUM 7.8 04/19/2022    BILITOT 0.84 04/19/2022    ALKPHOS 72 04/19/2022    AST 87 04/19/2022    ALT 67 04/19/2022       POC Tests: No results for input(s): POCGLU, POCNA, POCK, POCCL, POCBUN, POCHEMO, POCHCT in the last 72 hours.     Coags:   Lab Results   Component Value Date    PROTIME 10.7 04/19/2022    INR 1.0 04/19/2022    APTT 25.5 04/19/2022       HCG (If Applicable): No results found for: PREGTESTUR, PREGSERUM, HCG, HCGQUANT     ABGs: No results found for: PHART, PO2ART, QZU1ZAB, YHE3SAN, BEART, U4LGEKEM     Type & Screen (If Applicable):  No results found for: LABABO, LABRH    Drug/Infectious Status (If Applicable):  Lab Results   Component Value Date    HEPCAB NONREACTIVE 07/26/2018       COVID-19 Screening (If Applicable):   Lab Results   Component Value Date    COVID19 Not Detected 04/19/2022           Anesthesia Evaluation    Airway: Mallampati: Unable to assess / NA     Neck ROM: limited  Comment: Did not assess, patient is orally intubated   Dental:    (+) other      Pulmonary:   (+) COPD:  sleep apnea:  decreased breath sounds,  asthma:                            Cardiovascular:Negative CV ROS                      Neuro/Psych:   Negative Neuro/Psych ROS GI/Hepatic/Renal:   (+) liver disease:,           Endo/Other: Negative Endo/Other ROS                    Abdominal:             Vascular: negative vascular ROS. Other Findings:           Anesthesia Plan      general     ASA 3     (Intubated in ED for airway protection. Laryngeal edema noted at time of laryngoscopy. Will keep intubated post-op.)  Induction: intravenous. arterial line  MIPS: Postoperative ventilation. Anesthetic plan and risks discussed with patient. Plan discussed with CRNA.                   Martha Nam MD   4/19/2022

## 2022-04-19 NOTE — ED PROVIDER NOTES
[facility], [place]     Emergency Department     Faculty Attestation    I performed a history and physical examination of the patient and discussed management with the resident. I reviewed the residents note and agree with the documented findings and plan of care. Any areas of disagreement are noted on the chart. I was personally present for the key portions of any procedures. I have documented in the chart those procedures where I was not present during the key portions. I have reviewed the emergency nurses triage note. I agree with the chief complaint, past medical history, past surgical history, allergies, medications, social and family history as documented unless otherwise noted below. For Physician Assistant/ Nurse Practitioner cases/documentation I have personally evaluated this patient and have completed at least one if not all key elements of the E/M (history, physical exam, and MDM). Additional findings are as noted. I have personally seen and evaluated the patient. I find the patient's history and physical exam are consistent with the NP/PA documentation. I agree with the care provided, treatment rendered, disposition and follow-up plan. Passer for evaluation of cervical fracture from a recent fall possible syncopal prior to the fall mentating clearly at the present time trauma consulted upon arrival      [address] , M.D. Attending Emergency  Physician      During the assessment by the trauma service it was determined this patient's airway was in jeopardy. The patient developed some minor stridor and hot potato voice in addition was becoming increasingly agitated likely from alcohol withdrawal .at that time the decision was made to orotracheally intubate the patient prior to going MRI. Patient underwent routine RSI and video laryngoscopy found to have laryngeal edema on observation patient was intubated without difficulty during the procedure. Joaquina Sheth MD  04/19/22 [id number]       Joaquina Sheth MD  04/19/22 2716

## 2022-04-19 NOTE — CONSULTS
Department of Neurosurgery                                              Consult Note      Reason for Consult:  C6 fx  Requesting Physician:  Karen Kras:   [x] Dr. Bryce Seymour  [] Dr. Adina Snellen  [] Dr. Jeannette Booker  [] Dr. Myriam Barrios      History Obtained From:  patient, electronic medical record    CHIEF COMPLAINT:         Chief Complaint   Patient presents with    Fall     transfer from [facility]:       The patient is a 75 y.o. male who presents after fall down stairs with neck pain. Admits to right hand numbness. Denies any other pains or injuries at this time. Has been drinking daily. Denies taking blood thinners. Neurosurgery consulted for C6 fracture. Ethanol 241.      PAST MEDICAL HISTORY :       Past Medical History:        Diagnosis Date    Asthma     Calculus of gallbladder without mention of cholecystitis or obstruction     Chronic back pain     Hyperlipidemia     Insomnia, unspecified     Spinal stenosis, unspecified region other than cervical     Urinary incontinence        Past Surgical History:        Procedure Laterality Date    COLONOSCOPY      VT COLONOSCOPY FLX DX W/COLLJ SPEC WHEN PFRMD N/A 2018    COLONOSCOPY performed by Efrain Cook MD at [facility]  2018    EGD BIOPSY performed by Efrain Cook MD at [address] [place] [address] History:   Social History     Socioeconomic History    Marital status:      Spouse name: Not on file    Number of children: Not on file    Years of education: Not on file    Highest education level: Not on file   Occupational History    Not on file   Tobacco Use    Smoking status: Former Smoker     Packs/day: 1.00     Years: 24.00     Pack years: 24.00     Types: Cigarettes     Quit date:      Years since quittin.3    Smokeless tobacco: Never Used   Vaping Use    Vaping Use: Never used   Substance and Sexual Activity    Alcohol use: Yes     Comment: daily drinker (vodka)    Drug use: No    Sexual activity: Not on file   Other Topics Concern    Not on file   Social History Narrative    Not on file     Social Determinants of Health     Financial Resource Strain:     Difficulty of Paying Living Expenses: Not on file   Food Insecurity:     Worried About Running Out of Food in the Last Year: Not on file    Jennifer of Food in the Last Year: Not on file   Transportation Needs:     Lack of Transportation (Medical): Not on file    Lack of Transportation (Non-Medical): Not on file   Physical Activity:     Days of Exercise per Week: Not on file    Minutes of Exercise per Session: Not on file   Stress:     Feeling of Stress : Not on file   Social Connections:     Frequency of Communication with Friends and Family: Not on file    Frequency of Social Gatherings with Friends and Family: Not on file    Attends Worship Services: Not on file    Active Member of [address] [place] [address] or Organizations: Not on file    Attends Club or Organization Meetings: Not on file    Marital Status: Not on file   Intimate Partner Violence:     Fear of Current or Ex-Partner: Not on file    Emotionally Abused: Not on file    Physically Abused: Not on file    Sexually Abused: Not on file   Housing Stability:     Unable to Pay for Housing in the Last Year: Not on file    Number of [place] in the Last Year: Not on file    Unstable Housing in the Last Year: Not on file       Family History:   History reviewed. No pertinent family history. Allergies:  Seasonal and Cat hair extract    Home Medications:  Prior to Admission medications    Medication Sig Start Date End Date Taking?  Authorizing Provider   traZODone (DESYREL) 150 MG tablet take 1 tablet by mouth at bedtime 4/12/22   Kathy Rowell MD   pantoprazole (PROTONIX) 40 MG tablet Take 1 tablet by mouth daily 2/22/22   Isis Mcdonnell MD   pantoprazole (PROTONIX) 40 MG tablet take 1 tablet by mouth every morning before breakfast 2/21/22   Uyen Segal MD   atorvastatin (LIPITOR) 20 MG tablet Take 1 tablet by mouth daily 2/21/22   Uyen Segal MD   albuterol sulfate  (90 Base) MCG/ACT inhaler inhale 1 puff by mouth and INTO THE LUNGS every 4 hours if needed for wheezing 2/1/22   Lilliana Heredia MD   montelukast (SINGULAIR) 10 MG tablet Take 1 tablet by mouth nightly 1/5/22   Lilliana Heredia MD   BRETIFFANIE ELLIPTA 200-25 MCG/INH AEPB inhaler Inhale 1 puff into the lungs daily 1/5/22   Lilliana Heredia MD   azelastine (ASTELIN) 0.1 % nasal spray 1 spray by Nasal route 2 times daily Use in each nostril as directed 1/5/22   Lilliana Heredia MD   clobetasol (TEMOVATE) 0.05 % ointment Apply topically 2 times daily. 8/5/21   Lilliana Heredia MD   clindamycin (CLEOCIN) 150 MG capsule take 1 capsule by mouth three times a day  Patient not taking: Reported on 8/5/2021 5/21/21   Historical Provider, MD   ibuprofen (ADVIL;MOTRIN) 800 MG tablet take 1 tablet by mouth three times a day  Patient not taking: Reported on 8/5/2021 5/21/21   Historical Provider, MD   penicillin v potassium (VEETID) 500 MG tablet take 1 tablet by mouth three times a day  Patient not taking: Reported on 8/5/2021 5/1/21   Historical Provider, MD   PREVIDENT 5000 ENAMEL PROTECT 1.1-5 % PSTE APPLY TO TEETH,SWISH FOR 30 SECONDS THEN EXPECTORATE ONCE DAILY DO NOT EAT OR DRINK FOR 30 MINUTES 6/6/21   Historical Provider, MD   nystatin (MYCOSTATIN) 223766 UNIT/ML suspension Take 5 mLs by mouth 4 times daily 9/19/18   Robina Miller MD   ibuprofen (ADVIL;MOTRIN) 400 MG tablet Take 400 mg by mouth every 6 hours as needed for Pain.     Historical Provider, MD       Current Medications:   Current Facility-Administered Medications: thiamine (B-1) 100 mg in sodium chloride 0.9 % 100 mL IVPB, 100 mg, IntraVENous, Q24H    REVIEW OF SYSTEMS:       CONSTITUTIONAL: negative for fatigue and malaise   EYES: negative for double vision and photophobia    HEENT: negative for tinnitus and sore throat   RESPIRATORY: negative for cough, shortness of breath   CARDIOVASCULAR: negative for chest pain, palpitations   GASTROINTESTINAL: negative for nausea, vomiting   GENITOURINARY: negative for incontinence   MUSCULOSKELETAL: negative for neck or back pain   NEUROLOGICAL: negative for seizures   PSYCHIATRIC: negative for agitated     Review of systems otherwise negative.     PHYSICAL EXAM:       BP (!) 142/65   Pulse 104   Temp 98.1 °F (36.7 °C) (Oral)   Resp 18   Ht 6' 2\" (1.88 m)   Wt 210 lb (95.3 kg)   SpO2 94%   BMI 26.96 kg/m²     CONSTITUTIONAL: no apparent distress, appears stated age   HEAD: normocephalic, atraumatic   ENT: moist mucous membranes, uvula midline   NECK: supple, symmetric, no midline tenderness to palpation   BACK: without midline tenderness, step-offs or deformities   LUNGS: clear to auscultation bilaterally   CARDIOVASCULAR: regular rate and rhythm   ABDOMEN: Soft, non-tender, non-distended with normal active bowel sounds   NEUROLOGIC:  EYE OPENING     Spontaneous - 4 [x]       To voice - 3 []       To pain - 2 []       None - 1 []    VERBAL RESPONSE     Appropriate, oriented - 5 [x]       Dazed or confused - 4 []       Syllables, expletives - 3 []       Grunts - 2 []       None - 1 []    MOTOR RESPONSE     Spontaneous, command - 6 [x]       Localizes pain - 5 []       Withdraws pain - 4 []       Abnormal flexion - 3 []       Abnormal extension - 2 []       None - 1 []            Total GCS: 15    Mental Status:  A&Ox3               Cranial Nerves:    cranial nerves II-XII are grossly intact    Motor Exam:    Drift:  absent  Tone:  normal    Motor exam is symmetrical 5 out of 5 all extremities bilaterally except right upper extremity, triceps 4-/5    Sensory:    Right Upper Extremity:  Normal, except middle fingers  Left Upper Extremity:  normal  Right Lower Extremity:  normal  Left Lower Extremity:  normal    Deep Tendon Reflexes:    Right Bicep:  2+  Left Bicep: 2+  Right Knee:  2+  Left Knee:  2+    Gait: N/A   SKIN: no rash       LABS AND IMAGING:     CBC with Differential:    Lab Results   Component Value Date    WBC 8.1 04/19/2022    RBC 4.09 04/19/2022    RBC 4.82 02/09/2012    HGB 12.6 04/19/2022    HCT 37.4 04/19/2022     04/19/2022     02/09/2012    MCV 91.3 04/19/2022    MCH 30.7 04/19/2022    MCHC 33.7 04/19/2022    RDW 24.9 04/19/2022    LYMPHOPCT 14 04/19/2022    MONOPCT 8 04/19/2022    BASOPCT 1 04/19/2022    MONOSABS 0.65 04/19/2022    LYMPHSABS 1.13 04/19/2022    EOSABS 0.00 04/19/2022    BASOSABS 0.08 04/19/2022    DIFFTYPE NOT REPORTED 02/27/2017     BMP:    Lab Results   Component Value Date     04/19/2022    K 4.0 04/19/2022    CL 95 04/19/2022    CO2 17 04/19/2022    BUN 7 04/19/2022    LABALBU 4.2 04/19/2022    LABALBU 5.0 02/09/2012    CREATININE 0.59 04/19/2022    CALCIUM 8.3 04/19/2022    GFRAA >60 04/19/2022    LABGLOM >60 04/19/2022    GLUCOSE 136 04/19/2022    GLUCOSE 110 02/09/2012       Radiology Review:    EXAMINATION:   CT OF THE CERVICAL SPINE WITHOUT CONTRAST 4/19/2022 4:50 am       TECHNIQUE:   CT of the cervical spine was performed without the administration of   intravenous contrast. Multiplanar reformatted images are provided for review.    Dose modulation, iterative reconstruction, and/or weight based adjustment of   the mA/kV was utilized to reduce the radiation dose to as low as reasonably   achievable.       COMPARISON:   None.       HISTORY:   ORDERING SYSTEM PROVIDED HISTORY: fall EtOH   TECHNOLOGIST PROVIDED HISTORY:   fall EtOH   Decision Support Exception - unselect if not a suspected or confirmed   emergency medical condition->Emergency Medical Condition (MA)   Reason for Exam: fall EtOH   Additional signs and symptoms: PT STATES HE FELL DOWN 16 STEPS       FINDINGS:   BONES/ALIGNMENT: There is normal alignment of the cervical spine. Pallavi Cheers is   an acute fracture noted through the right C6 inferior articular process and   right C7 superior articular process.  No other fractures are identified.       DEGENERATIVE CHANGES: There is multilevel degenerative disc disease noted in   the cervical spine, greatest at C4-C5 through C6-C7.       There is asymmetric right-sided facet arthropathy at C3-C4 with fusion across   the facet joint.  Asymmetric right-sided foraminal narrowing is noted at   C3-C4.       SOFT TISSUES: There is paraseptal emphysema and centrilobular emphysema.  The   thyroid gland is unremarkable.           Impression   1. Acute fractures noted through the right C6 inferior articular process and   right C7 superior articular process. 2. No other fractures are identified.         EXAMINATION:   CT OF THE CHEST, ABDOMEN, AND PELVIS WITH CONTRAST; CT OF THE THORACIC SPINE   WITHOUT CONTRAST; CT OF THE LUMBAR SPINE WITHOUT CONTRAST, 4/19/2022 5:20 am;   4/19/2022 4:50 am       TECHNIQUE:   CT of the chest, abdomen and pelvis was performed with the administration of   intravenous contrast. Multiplanar reformatted images are provided for review. Dose modulation, iterative reconstruction, and/or weight based adjustment of   the mA/kV was utilized to reduce the radiation dose to as low as reasonably   achievable.; CT of the thoracic spine was performed without the   administration of intravenous contrast. Multiplanar reformatted images are   provided for review. Dose modulation, iterative reconstruction, and/or weight   based adjustment of the mA/kV was utilized to reduce the radiation dose to as   low as reasonably achievable.; CT of the lumbar spine was performed without   the administration of intravenous contrast. Multiplanar reformatted images   are provided for review.  Adjustment of mA and/or kV according to patient   size was utilized. [employer] modulation, iterative reconstruction, and/or weight   based adjustment of the mA/kV was utilized to reduce the radiation dose to as   low as reasonably achievable.     COMPARISON:   None.       HISTORY:   ORDERING SYSTEM PROVIDED HISTORY: fall etoh   TECHNOLOGIST PROVIDED HISTORY:   fall etoh       Decision Support Exception - unselect if not a suspected or confirmed   emergency medical condition->Emergency Medical Condition (MA)   Reason for Exam: fall, etoh   Additional signs and symptoms: PT STATES HE FELL DOWN 16 STEPS; ORDERING   SYSTEM PROVIDED HISTORY: fall etoh   TECHNOLOGIST PROVIDED HISTORY:   fall etoh   Reason for Exam: fall, etoh, pain   Additional signs and symptoms: PT STATES HE FELL DOWN 16 STEPS; ORDERING   SYSTEM PROVIDED HISTORY: fall etoh   TECHNOLOGIST PROVIDED HISTORY:   fall etoh   Decision Support Exception - unselect if not a suspected or confirmed   emergency medical condition->Emergency Medical Condition (MA)   Reason for Exam: fal, etoh   Additional signs and symptoms: PT STATES HE FELL DOWN 16 STEPS       FINDINGS:       CTA CHEST:       Thoracic aorta: No thoracic aortic aneurysm is identified.  Thoracic aortic   ectasia measuring 4.5 cm.  No great vessel stenosis is identified.       Pulmonary arteries: Central pulmonary arteries appear grossly unremarkable,   with normal size and no definite emboli though poorly evaluated given the   degree of contrast opacification.       Mediastinum: No mediastinal hematoma is identified.  Coronary artery   atherosclerosis.  Minimal fluid in the pericardial recesses.  No mediastinal   lymphadenopathy is identified.  No focal esophageal thickening.  Thyroid   gland appears small but otherwise unremarkable.       Lungs: No pleural effusion is identified.  Respiratory motion artifact.    Subsegmental atelectasis.  No spiculated lung mass.  Minimal secretions noted   within the trachea and bronchi.  No traumatic laceration or pneumothorax.       Chest wall/osseous structures: No axillary or supraclavicular lymphadenopathy   is identified.  No rib fracture is identified.  No osseous destructive   process is identified.  No acute thoracic spine vertebral body fracture.           CTA ABDOMEN:       Organs: Diffuse severe hepatic steatosis.  No splenic mass is identified. The adrenal glands, pancreas, gallbladder, and kidneys show no traumatic   injury or neoplastic process.  No inflammatory change.       GI/bowel: Normal appendix.  No ileus or obstruction.  Colonic diverticulosis   is identified.  A jejunal intussusception is seen in the left mid abdomen,   likely a transient finding without significant proximal obstruction.       Peritoneum/retroperitoneum: No aortic aneurysm.  No dissection.  No   mesenteric artery injury is identified.  No retroperitoneal or mesenteric   lymphadenopathy is identified.  Tortuous abdominal aorta.  Fat containing   umbilical hernia.           CTA PELVIS:       Vascular: The iliac vasculature appears intact.  No hemorrhage is identified. No acute injury.       Nonvascular: The bladder appears unremarkable.  The plastic gland appears   grossly unremarkable as well.  No free fluid or pelvic lymphadenopathy is   identified.       Soft tissue/osseous structures: Spine report below.  Sacroiliac joints appear   intact.  No pelvic fracture is identified.  The proximal femurs appear intact.           THORACIC/LUMBAR SPINE:       Thoracic spine: No thoracic spine vertebral body fracture.  Multilevel   mild-to-moderate degenerative disc disease.  Posterior vertebral body   alignment appears intact.  No osseous erosive changes are identified. Transverse processes appear intact.       Lumbar spine: Multilevel degenerative disc disease seen in the lumbar spine,   greatest at the level of L2-L3 and L3-L4, with minimal retrolisthesis of L3   on L4 with a small degree of anterior spinal canal narrowing.  Mild narrowing   at the L5-S1 level, with bilateral pars defects of L5 though no significant   L5 spondylolisthesis.         Impression   1.  No acute osseous fracture identified within the chest, abdomen or pelvis. The thoracic and lumbar spine appear intact, with multilevel degenerative   changes though no acute compression fracture. 2. Thoracic aortic ectasia measuring 4.5 cm.   3. Diffuse atherosclerotic disease including coronary artery involvement. 4. No acute parenchymal process seen within the lungs.  Minimal secretions   noted within the central airways. 5. Diffuse severe hepatic steatosis. 6. Diverticulosis. 7. Jejunal intussusception seen in the left mid abdomen, likely a transient   finding and often seen on CT imaging studies. 8. Small fat containing umbilical hernia. ASSESSMENT AND PLAN:       Patient Active Problem List   Diagnosis    Clostridium difficile diarrhea    Chronic back pain    Insomnia    Moderate persistent asthma with acute exacerbation    Mixed hyperlipidemia    Acute bronchitis    Adverse drug reaction    Liver disease, chronic, due to alcohol (HCC)    COPD (chronic obstructive pulmonary disease) with chronic bronchitis (HCC)    Obstructive sleep apnea    Paroxysmal SVT (supraventricular tachycardia) (HCC)    Mild persistent asthma without complication    Dysphagia    Candida esophagitis (HCC)         A/P:  This is a 75 y.o. male with C6/C7 facet fracture    Patient care discussed and evaluated with attending     - Plan for surgery today ACD, R C6-7 foraminotomy with fixation   - Follow up MRI cervical without contrast   - Follow up MRA neck without contrast   - NPO now   - Follow up pre operative labs  - CTLS recommendations: C-collar  - HOB: 30 degrees   - Neuro checks per protocol  - Hold all antiplatelets and anticoagulants  - We recommend SBP < 140   - Determine the lower limit of SBP clinically based on mentation    Additional recommendations may follow    Please contact neurosurgery with any changes in patients neurologic status. Thank you for your consult.        Siobhan Wilkerson DO  4/19/2022  8:58 AM

## 2022-04-19 NOTE — ED NOTES
Report given to [facility] ICU RN by Diane Alvarez.       Laura Alejandra RN  04/19/22 [id number]

## 2022-04-19 NOTE — ED NOTES
Dr. Qing Donald, Dr. Betsy Wang, Dr. Sneha Morton RN, RRT, and writer at patient bedside to begin intubation. Time out done, patient is aware of intubation need. Patient states understanding. Patient was pre-oyxgenated prior to intubation, patient was given 30mg of etomidate and 150mg of succinylcholine. Intubated with 7.5 ETT, 24cm at the lip, JASPREET rise and fall of chest, color change, and JASPREET breath sounds. ETT secured by RRT. OG tube placed by Dr. Betsy Wang. Bolus of propofol 30mg by Dr. Betsy Wang, patient was then started on 30mcg/kg/min at this time.         Marine Wakefield RN  04/19/22 [id number]

## 2022-04-19 NOTE — PROGRESS NOTES
[facility]  Speech Language Pathology    Date: 4/19/2022  Patient Name: Serge Lamas  YOB: 1955   AGE: 75 y.o. MRN: [id number]        Patient Not Available for Speech Therapy     Due to:  [] Testing  [] Hemodialysis  [] Cancelled by RN  [] Surgery   [x] Intubation/Sedation/Pain Medication  [] Medical instability  [] Other:    Next scheduled treatment: as medically appropriate  Completed by:  Laisha Hunter, SLP, M.S. [id number] [place] [address]

## 2022-04-19 NOTE — ED PROVIDER NOTES
EMERGENCY DEPARTMENT ENCOUNTER    Pt Name: Carmelita Darling  MRN: [id number]  [place] 1955  Date of evaluation: 4/19/22  CHIEF COMPLAINT       Chief Complaint   Patient presents with   [address]   HPI     This is a 60-year-old male with a history of alcohol abuse and cervical spinal stenosis who comes in today. The patient fell down a flight of stairs he states that he has been falling down a lot recently. The patient is an alcoholic. History is unable to be completely obtained secondary to the patient's clinical condition.       REVIEW OF SYSTEMS     Review of Systems unable to be completely obtained secondary to patient's clinical condition  PASTMEDICAL HISTORY     Past Medical History:   Diagnosis Date    Asthma     Calculus of gallbladder without mention of cholecystitis or obstruction     Chronic back pain     Hyperlipidemia     Insomnia, unspecified     Spinal stenosis, unspecified region other than cervical     Urinary incontinence      SURGICAL HISTORY       Past Surgical History:   Procedure Laterality Date    COLONOSCOPY      IN COLONOSCOPY FLX DX W/COLLJ SPEC WHEN PFRMD N/A 8/29/2018    COLONOSCOPY performed by Ade Gonzales MD at [address] [place] [address]  8/29/2018    EGD BIOPSY performed by Ade Gonzales MD at [address]       Previous Medications    ALBUTEROL SULFATE  (90 BASE) MCG/ACT INHALER    inhale 1 puff by mouth and INTO THE LUNGS every 4 hours if needed for wheezing    ATORVASTATIN (LIPITOR) 20 MG TABLET    Take 1 tablet by mouth daily    AZELASTINE (ASTELIN) 0.1 % NASAL SPRAY    1 spray by Nasal route 2 times daily Use in each nostril as directed    BREO ELLIPTA 200-25 MCG/INH AEPB INHALER    Inhale 1 puff into the lungs daily    CLINDAMYCIN (CLEOCIN) 150 MG CAPSULE    take 1 capsule by mouth three times a day    CLOBETASOL (TEMOVATE) 0.05 % OINTMENT    Apply topically 2 times daily. IBUPROFEN (ADVIL;MOTRIN) 400 MG TABLET    Take 400 mg by mouth every 6 hours as needed for Pain. IBUPROFEN (ADVIL;MOTRIN) 800 MG TABLET    take 1 tablet by mouth three times a day    MONTELUKAST (SINGULAIR) 10 MG TABLET    Take 1 tablet by mouth nightly    NYSTATIN (MYCOSTATIN) 315159 UNIT/ML SUSPENSION    Take 5 mLs by mouth 4 times daily    PANTOPRAZOLE (PROTONIX) 40 MG TABLET    take 1 tablet by mouth every morning before breakfast    PANTOPRAZOLE (PROTONIX) 40 MG TABLET    Take 1 tablet by mouth daily    PENICILLIN V POTASSIUM (VEETID) 500 MG TABLET    take 1 tablet by mouth three times a day    PREVIDENT 5000 ENAMEL PROTECT 1.1-5 % PSTE    APPLY TO TEETH,SWISH FOR 30 SECONDS THEN EXPECTORATE ONCE DAILY DO NOT EAT OR DRINK FOR 30 MINUTES    TRAZODONE (DESYREL) 150 MG TABLET    take 1 tablet by mouth at bedtime     ALLERGIES     is allergic to seasonal and cat hair extract. FAMILY HISTORY     He indicated that his mother is . He indicated that his father is . SOCIAL HISTORY       Social History     Tobacco Use    Smoking status: Former Smoker     Packs/day: 1.00     Years: 24.00     Pack years: 24.00     Types: Cigarettes     Quit date:      Years since quittin.3    Smokeless tobacco: Never Used   Vaping Use    Vaping Use: Never used   Substance Use Topics    Alcohol use: No     Comment: previous 3-4 drinks per day. Detox program [facility] 2014 and 2014    Drug use: No     PHYSICAL EXAM     INITIAL VITALS: /71   Pulse 99   Temp 98.3 °F (36.8 °C) (Oral)   Resp 18   Ht 6' 2\" (1.88 m)   Wt 200 lb (90.7 kg)   SpO2 96%   BMI 25.68 kg/m²    Physical Exam  Vitals and nursing note reviewed. Constitutional:       General: He is not in acute distress. Appearance: He is well-developed. HENT:      Head: Normocephalic and atraumatic. Eyes:      Conjunctiva/sclera: Conjunctivae normal.      Pupils: Pupils are equal, round, and reactive to light.    Neck: Comments: Lower cervical spinal tenderness to palpation  Cardiovascular:      Rate and Rhythm: Regular rhythm. Tachycardia present. Heart sounds: No murmur heard. No friction rub. Pulmonary:      Comments: Rhonchi bilaterally  Abdominal:      General: There is no distension. Palpations: Abdomen is soft. Tenderness: There is no abdominal tenderness. There is no guarding or rebound. Musculoskeletal:      Comments: Ecchymoses in different stages of healing on bilateral upper extremities, upper thoracic spinal tenderness to palpation, no lumbar tenderness to palpation   Skin:     General: Skin is warm and dry. Capillary Refill: Capillary refill takes less than 2 seconds. Neurological:      Mental Status: He is alert. DIAGNOSTIC RESULTS   RADIOLOGY:All plain film, CT, MRI, and formal ultrasound images (except ED bedside ultrasound) are read by the radiologist, see reports below, unless otherwisenoted in MDM or here. CT CHEST ABDOMEN PELVIS W CONTRAST   Final Result   1. No acute osseous fracture identified within the chest, abdomen or pelvis. The thoracic and lumbar spine appear intact, with multilevel degenerative   changes though no acute compression fracture. 2. Thoracic aortic ectasia measuring 4.5 cm.   3. Diffuse atherosclerotic disease including coronary artery involvement. 4. No acute parenchymal process seen within the lungs. Minimal secretions   noted within the central airways. 5. Diffuse severe hepatic steatosis. 6. Diverticulosis. 7. Jejunal intussusception seen in the left mid abdomen, likely a transient   finding and often seen on CT imaging studies. 8. Small fat containing umbilical hernia. CT LUMBAR SPINE WO CONTRAST   Final Result   1. No acute osseous fracture identified within the chest, abdomen or pelvis. The thoracic and lumbar spine appear intact, with multilevel degenerative   changes though no acute compression fracture.    2. Thoracic aortic ectasia measuring 4.5 cm.   3. Diffuse atherosclerotic disease including coronary artery involvement. 4. No acute parenchymal process seen within the lungs. Minimal secretions   noted within the central airways. 5. Diffuse severe hepatic steatosis. 6. Diverticulosis. 7. Jejunal intussusception seen in the left mid abdomen, likely a transient   finding and often seen on CT imaging studies. 8. Small fat containing umbilical hernia. CT THORACIC SPINE WO CONTRAST   Final Result   1. No acute osseous fracture identified within the chest, abdomen or pelvis. The thoracic and lumbar spine appear intact, with multilevel degenerative   changes though no acute compression fracture. 2. Thoracic aortic ectasia measuring 4.5 cm.   3. Diffuse atherosclerotic disease including coronary artery involvement. 4. No acute parenchymal process seen within the lungs. Minimal secretions   noted within the central airways. 5. Diffuse severe hepatic steatosis. 6. Diverticulosis. 7. Jejunal intussusception seen in the left mid abdomen, likely a transient   finding and often seen on CT imaging studies. 8. Small fat containing umbilical hernia. CT CERVICAL SPINE WO CONTRAST   Preliminary Result   1. Acute fractures noted through the right C6 inferior articular process and   right C7 superior articular process. 2. No other fractures are identified. CT HEAD WO CONTRAST   Preliminary Result   1. No acute intracranial abnormality. 2. Cerebral parenchymal volume loss with chronic microvascular white matter   ischemic disease. 3. Age indeterminate, probable chronic infarct involving the inferior right   cerebellar hemisphere. LABS: All lab results were reviewed by myself, and all abnormals are listed below.   Labs Reviewed   CBC WITH AUTO DIFFERENTIAL - Abnormal; Notable for the following components:       Result Value    RBC 4.09 (*)     Hemoglobin 12.6 (*) Hematocrit 37.4 (*)     RDW 24.9 (*)     Seg Neutrophils 77 (*)     Lymphocytes 14 (*)     Monocytes 8 (*)     All other components within normal limits   COMPREHENSIVE METABOLIC PANEL - Abnormal; Notable for the following components:    Glucose 136 (*)     BUN 7 (*)     CREATININE 0.59 (*)     Calcium 8.3 (*)     Chloride 95 (*)     CO2 17 (*)     Anion Gap 24 (*)     ALT 77 (*)      (*)     All other components within normal limits   ETHANOL - Abnormal; Notable for the following components:    Ethanol 241 (*)     All other components within normal limits   CK - Abnormal; Notable for the following components: Total CK 1,332 (*)     All other components within normal limits   COVID-19, RAPID   MAGNESIUM   BETA-HYDROXYBUTYRATE   LACTATE, SEPSIS       EMERGENCY DEPARTMENTCOURSE:   Differential diagnosis includes intracranial bleed fracture dislocation pneumonia. Patient initially refusing to wear cervical collar however I gave him an [place] collar and he is compliant with this. Patient has an anion gap of 24 most likely alcoholic ketoacidosis we will give him fluids and start D5. He has elevated liver enzymes most likely secondary to his alcohol use. CT head negative for acute intracranial bleed however CT cervical spine reveals C6 and C7 fractures. CT chest abdomen pelvis is negative for acute intra-abdominal or intrathoracic pathology. Patient will require transfer to [facility] for neurosurgical consultation I did speak to Dr. Bronwyn Whitten from [address] [place] [address] who kindly accepts the patient. EKG: All EKG's are interpreted by the Emergency Department Physician who either signs or Co-signs this chart in the absence of a cardiologist.  Junctional rhythm with a heart rate of 128 bpm prolonged QTC at 511 ms    CRITICAL CARE:   CRITICAL CARE: There was a high probability of clinically significant/life threatening deterioration in this patient's condition which required my urgent intervention.   Total critical care time was 30 minutes. This excludes any time for separately reportable procedures. Vitals:    Vitals:    04/19/22 0442 04/19/22 0522 04/19/22 0547   BP: (!) 147/75 (!) 141/85 135/71   Pulse: 107 102 99   Resp: 18 18 18   Temp: 98.3 °F (36.8 °C)     TempSrc: Oral     SpO2: 97% 95% 96%   Weight: 200 lb (90.7 kg)     Height: 6' 2\" (1.88 m)         The patient was given the following medications while in the emergency department:  Orders Placed This Encounter   Medications    sodium chloride flush 0.9 % injection 10 mL    iopamidol (ISOVUE-370) 76 % injection 75 mL    0.9 % sodium chloride bolus    0.9 % sodium chloride bolus    dextrose 5 % and 0.9 % sodium chloride infusion    fentaNYL (SUBLIMAZE) injection 50 mcg         FINAL IMPRESSION      1. Alcoholic ketoacidosis    2.  Closed nondisplaced fracture of sixth cervical vertebra, unspecified fracture morphology, initial encounter ([facility] [address]    3. Closed nondisplaced fracture of seventh cervical vertebra, unspecified fracture morphology, initial encounter [facility])         DISPOSITION/PLAN   DISPOSITION  Transfer    Shaun Solis MD  Attending Emergency Physician    This charting supersedes any ED resident or staff charting and was written using speech recognition software        Shaun Solis MD  04/19/22 [id number]

## 2022-04-19 NOTE — PROGRESS NOTES
Trauma Tertiary Survey    Admit Date: 4/19/2022  Hospital day 0    Fall down 15 steps       Past Medical History:   Diagnosis Date    Asthma     Calculus of gallbladder without mention of cholecystitis or obstruction     Chronic back pain     Hyperlipidemia     Insomnia, unspecified     Spinal stenosis, unspecified region other than cervical     Urinary incontinence        Scheduled Meds:   thiamine (VITAMIN B1) IVPB  500 mg IntraVENous Q8H    ondansetron  4 mg IntraVENous Once    diazePAM  10 mg Oral Q6H    atorvastatin  20 mg Oral Nightly    pantoprazole  40 mg Oral Daily     Continuous Infusions:   lactated ringers 125 mL/hr at 04/19/22 1000    propofol Stopped (04/19/22 1400)    midazolam Stopped (04/19/22 1530)    fentaNYL Stopped (04/19/22 1345)    sodium chloride       PRN Meds:sodium chloride flush, ondansetron **OR** ondansetron, [DISCONTINUED] fentanNYL **OR** fentanNYL, sodium chloride, lidocaine-EPINEPHrine, gelatin adsorbable, thrombin    Subjective:     Patient has complaints of neck pain. He is agitated, pulling at the C collar with increasingly garbled speech. Pain is moderate, worsens with movement, and some relief by rest.  There is not associated numbness, tingling, weakness.     Objective:     Patient Vitals for the past 8 hrs:   BP Pulse Resp SpO2   04/19/22 1501 132/74 100 24 99 %   04/19/22 1457 128/74 100 24 99 %   04/19/22 1400 134/70 100 20 99 %   04/19/22 1345 130/71 102 18 98 %   04/19/22 1330 (!) 144/71 102 18 98 %   04/19/22 1315 134/74 102 16 98 %   04/19/22 1300 114/74 100 19 98 %   04/19/22 1255 110/75 101 18 97 %   04/19/22 1225 -- -- 18 99 %   04/19/22 1210 119/78 103 20 99 %   04/19/22 1209 -- 112 -- --   04/19/22 1200 118/80 105 18 99 %   04/19/22 1155 120/85 105 18 100 %   04/19/22 1150 114/86 106 21 100 %   04/19/22 1145 (!) 142/103 110 19 100 %   04/19/22 1115 (!) 176/110 113 -- 99 %   04/19/22 1112 -- 113 19 97 %   04/19/22 1105 (!) 191/121 123 18 98 % 04/19/22 1100 (!) 160/95 109 19 99 %   04/19/22 1055 (!) 170/104 112 20 99 %   04/19/22 1002 124/70 107 19 --   04/19/22 1001 -- 106 22 --   04/19/22 1000 -- 108 16 --   04/19/22 0959 -- 118 18 --   04/19/22 0958 -- 109 18 --   04/19/22 0957 -- 105 19 --   04/19/22 0956 -- 105 17 --   04/19/22 0955 -- 106 18 --   04/19/22 0954 -- 110 18 --   04/19/22 0953 -- 111 18 --   04/19/22 0952 -- 111 17 --   04/19/22 0951 -- 113 20 --   04/19/22 0950 -- 120 25 --   04/19/22 0949 -- 122 -- 93 %   04/19/22 0948 -- 125 22 --   04/19/22 0947 -- 110 17 94 %   04/19/22 0946 -- 110 18 94 %   04/19/22 0945 -- 110 22 --   04/19/22 0944 -- 104 18 94 %   04/19/22 0943 -- 111 16 94 %   04/19/22 0942 -- 104 19 93 %   04/19/22 0941 -- 114 19 94 %   04/19/22 0940 -- 111 20 93 %   04/19/22 0939 -- 104 18 95 %   04/19/22 0938 -- 115 19 94 %   04/19/22 0937 -- 109 20 93 %   04/19/22 0936 -- 111 19 93 %   04/19/22 0935 -- 107 17 92 %       No intake/output data recorded. I/O this shift: In: 700 [I.V.:600; IV Piggyback:100]  Out: 8351 [RSOBM:[id number]]    Radiology:  XR SHOULDER RIGHT (MIN 2 VIEWS)    Result Date: 4/19/2022  EXAMINATION: THREE XRAY VIEWS OF THE RIGHT SHOULDER 4/19/2022 7:43 am COMPARISON: None. HISTORY: ORDERING SYSTEM PROVIDED HISTORY: Fall TECHNOLOGIST PROVIDED HISTORY: Fall 59-year-old male with history of fall FINDINGS: Moderate degenerative changes of the right glenohumeral joint. Mild degenerative change of the right AC joint. Visualized right-sided ribs appear intact. No acute fracture or dislocation. 1. Moderate right glenohumeral osteoarthrosis. 2. Mild degenerative changes of the right AC joint. No acute fracture or dislocation. XR ACUTE ABD SERIES CHEST 1 VW    Result Date: 4/19/2022  EXAMINATION: TWO XRAY VIEWS OF THE ABDOMEN AND SINGLE  XRAY VIEW OF THE CHEST 4/19/2022 8:46 am COMPARISON: None.  HISTORY: ORDERING SYSTEM PROVIDED HISTORY: preop TECHNOLOGIST PROVIDED HISTORY: preop FINDINGS: The lungs are without consolidation or effusion. There is no pneumothorax. The mediastinal structures are unremarkable. The extrathoracic soft tissues are unremarkable. There is a nonobstructive bowel gas pattern. There is no free air. There are no suspicious calcifications. There is no acute osseous abnormality. The surrounding soft tissues are unremarkable. No acute cardiopulmonary process. Nonobstructive bowel gas pattern. CT HEAD WO CONTRAST    Result Date: 4/19/2022  EXAMINATION: CT OF THE HEAD WITHOUT CONTRAST  4/19/2022 4:50 am TECHNIQUE: CT of the head was performed without the administration of intravenous contrast. Dose modulation, iterative reconstruction, and/or weight based adjustment of the mA/kV was utilized to reduce the radiation dose to as low as reasonably achievable. COMPARISON: 06/22/2014 HISTORY: ORDERING SYSTEM PROVIDED HISTORY: fall etoh TECHNOLOGIST PROVIDED HISTORY: fall etoh Decision Support Exception - unselect if not a suspected or confirmed emergency medical condition->Emergency Medical Condition ([place]) Reason for Exam: fall etoh Additional signs and symptoms: PT STATES HE FELL DOWN 16 STEPS FINDINGS: BRAIN/VENTRICLES: The cerebral and cerebellar parenchyma demonstrate volume loss. Scattered low-attenuation areas are noted supratentorially, compatible with chronic microvascular white matter ischemic disease. There are no areas of hemorrhage, mass, or midline shift. No abnormal extra-axial fluid collections. The ventricles are prominent in size, likely related to involutional change, stable. Gray-white differentiation is maintained without evidence of an acute infarct. The there is a probable small chronic infarct involving the inferior right cerebellar hemisphere. ORBITS: The visualized portion of the orbits demonstrate no acute abnormality. SINUSES: The paranasal sinuses and mastoid air cells are well aerated.  SOFT TISSUES/SKULL:  No acute abnormality of the visualized skull or soft tissues. 1. No acute intracranial abnormality. 2. Cerebral parenchymal volume loss with chronic microvascular white matter ischemic disease. 3. 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 CT CERVICAL SPINE WO CONTRAST    Result Date: 4/19/2022  EXAMINATION: CT OF THE CERVICAL SPINE WITHOUT CONTRAST 4/19/2022 4:50 am TECHNIQUE: CT of the cervical spine was performed without the administration of intravenous contrast. Multiplanar reformatted images are provided for review. Dose modulation, iterative reconstruction, and/or weight based adjustment of the mA/kV was utilized to reduce the radiation dose to as low as reasonably achievable. COMPARISON: None. HISTORY: ORDERING SYSTEM PROVIDED HISTORY: fall EtOH TECHNOLOGIST PROVIDED HISTORY: fall EtOH Decision Support Exception - unselect if not a suspected or confirmed emergency medical condition->Emergency Medical Condition (MA) Reason for Exam: fall EtOH Additional signs and symptoms: PT STATES HE FELL DOWN 16 STEPS FINDINGS: BONES/ALIGNMENT: There is normal alignment of the cervical spine. There is an acute fracture noted through the right C6 inferior articular process and right C7 superior articular process. No other fractures are identified. DEGENERATIVE CHANGES: There is multilevel degenerative disc disease noted in the cervical spine, greatest at C4-C5 through C6-C7. There is asymmetric right-sided facet arthropathy at C3-C4 with fusion across the facet joint. Asymmetric right-sided foraminal narrowing is noted at C3-C4. SOFT TISSUES: There is paraseptal emphysema and centrilobular emphysema. The thyroid gland is unremarkable. 1. Acute fractures noted through the right C6 inferior articular process and right C7 superior articular process. 2. No other fractures are identified.      CT THORACIC SPINE WO CONTRAST    Result Date: 4/19/2022  EXAMINATION: CT OF THE CHEST, ABDOMEN, AND PELVIS WITH CONTRAST; CT OF THE THORACIC SPINE WITHOUT CONTRAST; CT OF THE LUMBAR SPINE WITHOUT CONTRAST, 4/19/2022 5:20 am; 4/19/2022 4:50 am TECHNIQUE: CT of the chest, abdomen and pelvis was performed with the administration of intravenous contrast. Multiplanar reformatted images are provided for review. Dose modulation, iterative reconstruction, and/or weight based adjustment of the mA/kV was utilized to reduce the radiation dose to as low as reasonably achievable.; CT of the thoracic spine was performed without the administration of intravenous contrast. Multiplanar reformatted images are provided for review. Dose modulation, iterative reconstruction, and/or weight based adjustment of the mA/kV was utilized to reduce the radiation dose to as low as reasonably achievable.; CT of the lumbar spine was performed without the administration of intravenous contrast. Multiplanar reformatted images are provided for review. Adjustment of mA and/or kV according to patient size was utilized. Dose modulation, iterative reconstruction, and/or weight based adjustment of the mA/kV was utilized to reduce the radiation dose to as low as reasonably achievable. COMPARISON: None.  HISTORY: ORDERING SYSTEM PROVIDED HISTORY: fall etoh TECHNOLOGIST PROVIDED HISTORY: fall etoh Decision Support Exception - unselect if not a suspected or confirmed emergency medical condition->Emergency Medical Condition (MA) Reason for Exam: fall, etoh Additional signs and symptoms: PT STATES HE FELL DOWN 16 STEPS; ORDERING SYSTEM PROVIDED HISTORY: fall etoh TECHNOLOGIST PROVIDED HISTORY: fall etoh Reason for Exam: fall, etoh, pain Additional signs and symptoms: PT STATES HE FELL DOWN 16 STEPS; ORDERING SYSTEM PROVIDED HISTORY: fall etoh TECHNOLOGIST PROVIDED HISTORY: fall etoh Decision Support Exception - unselect if not a suspected or confirmed emergency medical condition->Emergency Medical Condition (MA) Reason for Exam: fal, etoh Additional signs and symptoms: PT STATES HE FELL DOWN 16 STEPS FINDINGS: CTA CHEST: Thoracic aorta: No thoracic aortic aneurysm is identified. Thoracic aortic ectasia measuring 4.5 cm. No great vessel stenosis is identified. Pulmonary arteries: Central pulmonary arteries appear grossly unremarkable, with normal size and no definite emboli though poorly evaluated given the degree of contrast opacification. Mediastinum: No mediastinal hematoma is identified. Coronary artery atherosclerosis. Minimal fluid in the pericardial recesses. No mediastinal lymphadenopathy is identified. No focal esophageal thickening. Thyroid gland appears small but otherwise unremarkable. Lungs: No pleural effusion is identified. Respiratory motion artifact. Subsegmental atelectasis. No spiculated lung mass. Minimal secretions noted within the trachea and bronchi. No traumatic laceration or pneumothorax. Chest wall/osseous structures: No axillary or supraclavicular lymphadenopathy is identified. No rib fracture is identified. No osseous destructive process is identified. No acute thoracic spine vertebral body fracture. CTA ABDOMEN: Organs: Diffuse severe hepatic steatosis. No splenic mass is identified. The adrenal glands, pancreas, gallbladder, and kidneys show no traumatic injury or neoplastic process. No inflammatory change. GI/bowel: Normal appendix. No ileus or obstruction. Colonic diverticulosis is identified. A jejunal intussusception is seen in the left mid abdomen, likely a transient finding without significant proximal obstruction. Peritoneum/retroperitoneum: No aortic aneurysm. No dissection. No mesenteric artery injury is identified. No retroperitoneal or mesenteric lymphadenopathy is identified. Tortuous abdominal aorta. Fat containing umbilical hernia. CTA PELVIS: Vascular: The iliac vasculature appears intact. No hemorrhage is identified. No acute injury. Nonvascular: The bladder appears unremarkable.   The plastic gland appears grossly unremarkable as well. No free fluid or pelvic lymphadenopathy is identified. Soft tissue/osseous structures: Spine report below. Sacroiliac joints appear intact. No pelvic fracture is identified. The proximal femurs appear intact. THORACIC/LUMBAR SPINE: Thoracic spine: No thoracic spine vertebral body fracture. Multilevel mild-to-moderate degenerative disc disease. Posterior vertebral body alignment appears intact. No osseous erosive changes are identified. Transverse processes appear intact. Lumbar spine: Multilevel degenerative disc disease seen in the lumbar spine, greatest at the level of L2-L3 and L3-L4, with minimal retrolisthesis of L3 on L4 with a small degree of anterior spinal canal narrowing. Mild narrowing at the L5-S1 level, with bilateral pars defects of L5 though no significant L5 spondylolisthesis. 1. No acute osseous fracture identified within the chest, abdomen or pelvis. The thoracic and lumbar spine appear intact, with multilevel degenerative changes though no acute compression fracture. 2. Thoracic aortic ectasia measuring 4.5 cm. 3. Diffuse atherosclerotic disease including coronary artery involvement. 4. No acute parenchymal process seen within the lungs. Minimal secretions noted within the central airways. 5. Diffuse severe hepatic steatosis. 6. Diverticulosis. 7. Jejunal intussusception seen in the left mid abdomen, likely a transient finding and often seen on CT imaging studies. 8. Small fat containing umbilical hernia. CT LUMBAR SPINE WO CONTRAST    Result Date: 4/19/2022  EXAMINATION: CT OF THE CHEST, ABDOMEN, AND PELVIS WITH CONTRAST; CT OF THE THORACIC SPINE WITHOUT CONTRAST; CT OF THE LUMBAR SPINE WITHOUT CONTRAST, 4/19/2022 5:20 am; 4/19/2022 4:50 am TECHNIQUE: CT of the chest, abdomen and pelvis was performed with the administration of intravenous contrast. Multiplanar reformatted images are provided for review.  Dose modulation, iterative reconstruction, and/or weight based adjustment of the mA/kV was utilized to reduce the radiation dose to as low as reasonably achievable.; CT of the thoracic spine was performed without the administration of intravenous contrast. Multiplanar reformatted images are provided for review. Dose modulation, iterative reconstruction, and/or weight based adjustment of the mA/kV was utilized to reduce the radiation dose to as low as reasonably achievable.; CT of the lumbar spine was performed without the administration of intravenous contrast. Multiplanar reformatted images are provided for review. Adjustment of mA and/or kV according to patient size was utilized. Dose modulation, iterative reconstruction, and/or weight based adjustment of the mA/kV was utilized to reduce the radiation dose to as low as reasonably achievable. COMPARISON: None. HISTORY: ORDERING SYSTEM PROVIDED HISTORY: fall etoh TECHNOLOGIST PROVIDED HISTORY: fall etoh Decision Support Exception - unselect if not a suspected or confirmed emergency medical condition->Emergency Medical Condition (MA) Reason for Exam: fall, etoh Additional signs and symptoms: PT STATES HE FELL DOWN 16 STEPS; ORDERING SYSTEM PROVIDED HISTORY: fall etoh TECHNOLOGIST PROVIDED HISTORY: fall etoh Reason for Exam: fall, etoh, pain Additional signs and symptoms: PT STATES HE FELL DOWN 16 STEPS; ORDERING SYSTEM PROVIDED HISTORY: fall etoh TECHNOLOGIST PROVIDED HISTORY: fall etoh Decision Support Exception - unselect if not a suspected or confirmed emergency medical condition->Emergency Medical Condition (MA) Reason for Exam: fal, etoh Additional signs and symptoms: PT STATES HE FELL DOWN 16 STEPS FINDINGS: CTA CHEST: Thoracic aorta: No thoracic aortic aneurysm is identified. Thoracic aortic ectasia measuring 4.5 cm. No great vessel stenosis is identified.  Pulmonary arteries: Central pulmonary arteries appear grossly unremarkable, with normal size and no definite emboli though poorly evaluated given the degree of contrast opacification. Mediastinum: No mediastinal hematoma is identified. Coronary artery atherosclerosis. Minimal fluid in the pericardial recesses. No mediastinal lymphadenopathy is identified. No focal esophageal thickening. Thyroid gland appears small but otherwise unremarkable. Lungs: No pleural effusion is identified. Respiratory motion artifact. Subsegmental atelectasis. No spiculated lung mass. Minimal secretions noted within the trachea and bronchi. No traumatic laceration or pneumothorax. Chest wall/osseous structures: No axillary or supraclavicular lymphadenopathy is identified. No rib fracture is identified. No osseous destructive process is identified. No acute thoracic spine vertebral body fracture. CTA ABDOMEN: Organs: Diffuse severe hepatic steatosis. No splenic mass is identified. The adrenal glands, pancreas, gallbladder, and kidneys show no traumatic injury or neoplastic process. No inflammatory change. GI/bowel: Normal appendix. No ileus or obstruction. Colonic diverticulosis is identified. A jejunal intussusception is seen in the left mid abdomen, likely a transient finding without significant proximal obstruction. Peritoneum/retroperitoneum: No aortic aneurysm. No dissection. No mesenteric artery injury is identified. No retroperitoneal or mesenteric lymphadenopathy is identified. Tortuous abdominal aorta. Fat containing umbilical hernia. CTA PELVIS: Vascular: The iliac vasculature appears intact. No hemorrhage is identified. No acute injury. Nonvascular: The bladder appears unremarkable. The plastic gland appears grossly unremarkable as well. No free fluid or pelvic lymphadenopathy is identified. Soft tissue/osseous structures: Spine report below. Sacroiliac joints appear intact. No pelvic fracture is identified. The proximal femurs appear intact. THORACIC/LUMBAR SPINE: Thoracic spine: No thoracic spine vertebral body fracture.   Multilevel mild-to-moderate degenerative disc disease. Posterior vertebral body alignment appears intact. No osseous erosive changes are identified. Transverse processes appear intact. Lumbar spine: Multilevel degenerative disc disease seen in the lumbar spine, greatest at the level of L2-L3 and L3-L4, with minimal retrolisthesis of L3 on L4 with a small degree of anterior spinal canal narrowing. Mild narrowing at the L5-S1 level, with bilateral pars defects of L5 though no significant L5 spondylolisthesis. 1. No acute osseous fracture identified within the chest, abdomen or pelvis. The thoracic and lumbar spine appear intact, with multilevel degenerative changes though no acute compression fracture. 2. Thoracic aortic ectasia measuring 4.5 cm. 3. Diffuse atherosclerotic disease including coronary artery involvement. 4. No acute parenchymal process seen within the lungs. Minimal secretions noted within the central airways. 5. Diffuse severe hepatic steatosis. 6. Diverticulosis. 7. Jejunal intussusception seen in the left mid abdomen, likely a transient finding and often seen on CT imaging studies. 8. Small fat containing umbilical hernia. MRI CERVICAL SPINE WO CONTRAST    Result Date: 4/19/2022  EXAMINATION: MRI OF THE CERVICAL SPINE WITHOUT CONTRAST 4/19/2022 1:15 pm TECHNIQUE: Multiplanar multisequence MRI of the cervical spine was performed without the administration of intravenous contrast. COMPARISON: No MRI comparison available. Correlation with 04/19/2022 CT. HISTORY: ORDERING SYSTEM PROVIDED HISTORY: C6, preop plan TECHNOLOGIST PROVIDED HISTORY: C6, preop plan Decision Support Exception - unselect if not a suspected or confirmed emergency medical condition->Emergency Medical Condition (MA) Reason for Exam: Pre-op planning C6 Fracture. FINDINGS: BONES/ALIGNMENT: Straightening of the cervical spine without significant spondylolisthesis.   Asymmetric right C6-C7 facet joint fluid distension with surrounding edema compatible with previously seen fractures on same day CT. Additionally, the C6-C7 ligamentum flavum and posterior longitudinal ligament appear torn with fluid distension of the C6-C7 intervertebral space. Subtle marrow edema along the C7 anterior superior corner raise the possibility of an acute injury, possibly torn anterior longitudinal ligament or fractured osteophyte. The remaining vertebral body heights appear preserved. Mild narrowing of the remaining intervertebral disc spaces. SPINAL CORD: No convincing abnormal spinal cord signal. SOFT TISSUES: Cervicothoracic prevertebral soft tissue edema/fluid present measuring up to 0.6 cm in thickness. Posterior paraspinal soft tissue edema present in the cervical levels, including in the interspinous spaces. C2-C3: No significant thecal sac stenosis or foraminal narrowing. C3-C4: Uncovertebral and facet hypertrophy, worse on the right. No significant thecal sac stenosis. Moderate right foraminal narrowing. C4-C5: Small disc osteophyte complex. Facet hypertrophy. No significant thecal sac stenosis. Mild left foraminal narrowing. C5-C6: Mild posterior epidural hemorrhage. Small disc osteophyte complex. Ligamentum flavum thickening. Uncovertebral and facet hypertrophy. Mild thecal sac stenosis. Mild right and moderate left foraminal narrowing. C6-C7: Anterior and posterior epidural hemorrhage. Small disc osteophyte complex. Facet fractures and edema on right. Severe thecal sac stenosis. Moderate right foraminal narrowing. C7-T1: Mild posterior epidural hemorrhage. Small disc bulge. Mild thecal sac stenosis. No significant foraminal narrowing. 1. Acute traumatic injury at the C6-C7 level with right-sided facet fractures, torn ligamentum flavum and posterior longitudinal ligament, and possibly torn anterior longitudinal ligament versus osteophyte fracture. No significant spondylolisthesis.  2. Epidural hemorrhage centered the C6-C7 level results in severe thecal sac stenosis. No convincing abnormal spinal cord signal. 3. Cervicothoracic prevertebral soft tissue edema/fluid present, measuring up to 0.6 cm in thickness. 4. Posterior paraspinal soft tissue edema present in the cervical levels, including in the interspinous spaces. Preliminary findings discussed with ordering clinician Dr. Shanice Solo on 04/19/2022 at 3:26 p.m. XR CHEST PORTABLE    Result Date: 4/19/2022  EXAMINATION: ONE XRAY VIEW OF THE CHEST 4/19/2022 12:02 pm COMPARISON: None. HISTORY: ORDERING SYSTEM PROVIDED HISTORY: ET tube TECHNOLOGIST PROVIDED HISTORY: ET tube FINDINGS: The lungs are without consolidation or effusion. There is no pneumothorax. The mediastinal structures are unremarkable. The upper abdomen is unremarkable. The extrathoracic soft tissues are unremarkable. There is endotracheal tube with the tip in the midtrachea. There is a gastric tube with the tip in the mid esophagus. No acute cardiopulmonary process. Endotracheal tube in satisfactory position. Gastric tube with the tip in the mid esophagus and repositioning is recommended. CT CHEST ABDOMEN PELVIS W CONTRAST    Result Date: 4/19/2022  EXAMINATION: CT OF THE CHEST, ABDOMEN, AND PELVIS WITH CONTRAST; CT OF THE THORACIC SPINE WITHOUT CONTRAST; CT OF THE LUMBAR SPINE WITHOUT CONTRAST, 4/19/2022 5:20 am; 4/19/2022 4:50 am TECHNIQUE: CT of the chest, abdomen and pelvis was performed with the administration of intravenous contrast. Multiplanar reformatted images are provided for review. Dose modulation, iterative reconstruction, and/or weight based adjustment of the mA/kV was utilized to reduce the radiation dose to as low as reasonably achievable.; CT of the thoracic spine was performed without the administration of intravenous contrast. Multiplanar reformatted images are provided for review.  Dose modulation, iterative reconstruction, and/or weight based adjustment of the mA/kV was utilized to reduce the radiation dose to as low as reasonably achievable.; CT of the lumbar spine was performed without the administration of intravenous contrast. Multiplanar reformatted images are provided for review. Adjustment of mA and/or kV according to patient size was utilized. Dose modulation, iterative reconstruction, and/or weight based adjustment of the mA/kV was utilized to reduce the radiation dose to as low as reasonably achievable. COMPARISON: None. HISTORY: ORDERING SYSTEM PROVIDED HISTORY: fall etoh TECHNOLOGIST PROVIDED HISTORY: fall etoh Decision Support Exception - unselect if not a suspected or confirmed emergency medical condition->Emergency Medical Condition ([place]) Reason for Exam: fall, etoh Additional signs and symptoms: PT STATES HE FELL DOWN 16 STEPS; ORDERING SYSTEM PROVIDED HISTORY: fall etoh TECHNOLOGIST PROVIDED HISTORY: fall etoh Reason for Exam: fall, etoh, pain Additional signs and symptoms: PT STATES HE FELL DOWN 16 STEPS; ORDERING SYSTEM PROVIDED HISTORY: fall etoh TECHNOLOGIST PROVIDED HISTORY: fall etoh Decision Support Exception - unselect if not a suspected or confirmed emergency medical condition->Emergency Medical Condition (MA) Reason for Exam: fal, etoh Additional signs and symptoms: PT STATES HE FELL DOWN 16 STEPS FINDINGS: CTA CHEST: Thoracic aorta: No thoracic aortic aneurysm is identified. Thoracic aortic ectasia measuring 4.5 cm. No great vessel stenosis is identified. Pulmonary arteries: Central pulmonary arteries appear grossly unremarkable, with normal size and no definite emboli though poorly evaluated given the degree of contrast opacification. Mediastinum: No mediastinal hematoma is identified. Coronary artery atherosclerosis. Minimal fluid in the pericardial recesses. No mediastinal lymphadenopathy is identified. No focal esophageal thickening. Thyroid gland appears small but otherwise unremarkable. Lungs: No pleural effusion is identified. Respiratory motion artifact. Subsegmental atelectasis. No spiculated lung mass. Minimal secretions noted within the trachea and bronchi. No traumatic laceration or pneumothorax. Chest wall/osseous structures: No axillary or supraclavicular lymphadenopathy is identified. No rib fracture is identified. No osseous destructive process is identified. No acute thoracic spine vertebral body fracture. CTA ABDOMEN: Organs: Diffuse severe hepatic steatosis. No splenic mass is identified. The adrenal glands, pancreas, gallbladder, and kidneys show no traumatic injury or neoplastic process. No inflammatory change. GI/bowel: Normal appendix. No ileus or obstruction. Colonic diverticulosis is identified. A jejunal intussusception is seen in the left mid abdomen, likely a transient finding without significant proximal obstruction. Peritoneum/retroperitoneum: No aortic aneurysm. No dissection. No mesenteric artery injury is identified. No retroperitoneal or mesenteric lymphadenopathy is identified. Tortuous abdominal aorta. Fat containing umbilical hernia. CTA PELVIS: Vascular: The iliac vasculature appears intact. No hemorrhage is identified. No acute injury. Nonvascular: The bladder appears unremarkable. The plastic gland appears grossly unremarkable as well. No free fluid or pelvic lymphadenopathy is identified. Soft tissue/osseous structures: Spine report below. Sacroiliac joints appear intact. No pelvic fracture is identified. The proximal femurs appear intact. THORACIC/LUMBAR SPINE: Thoracic spine: No thoracic spine vertebral body fracture. Multilevel mild-to-moderate degenerative disc disease. Posterior vertebral body alignment appears intact. No osseous erosive changes are identified. Transverse processes appear intact.  Lumbar spine: Multilevel degenerative disc disease seen in the lumbar spine, greatest at the level of L2-L3 and L3-L4, with minimal retrolisthesis of L3 on L4 with a small degree of anterior spinal canal narrowing. Mild narrowing at the L5-S1 level, with bilateral pars defects of L5 though no significant L5 spondylolisthesis. 1. No acute osseous fracture identified within the chest, abdomen or pelvis. The thoracic and lumbar spine appear intact, with multilevel degenerative changes though no acute compression fracture. 2. Thoracic aortic ectasia measuring 4.5 cm. 3. Diffuse atherosclerotic disease including coronary artery involvement. 4. No acute parenchymal process seen within the lungs. Minimal secretions noted within the central airways. 5. Diffuse severe hepatic steatosis. 6. Diverticulosis. 7. Jejunal intussusception seen in the left mid abdomen, likely a transient finding and often seen on CT imaging studies. 8. Small fat containing umbilical hernia. MRA NECK WO CONTRAST    Result Date: 4/19/2022  EXAMINATION: MRA OF THE NECK WITHOUT CONTRAST 4/19/2022 1:15 pm TECHNIQUE: Multiplanar multisequence MRA of the neck was performed without the administration of intravenous contrast. Stenosis of the internal carotid arteries measured using NASCET criteria. COMPARISON: None. HISTORY: ORDERING SYSTEM PROVIDED HISTORY: preop TECHNOLOGIST PROVIDED HISTORY: preop Decision Support Exception - unselect if not a suspected or confirmed emergency medical condition->Emergency Medical Condition (MA) Reason for Exam: Pre-op planning C6 Fracture. FINDINGS: Suboptimal evaluation secondary to motion degradation. AORTIC ARCH/ARCH VESSELS: Grossly patent without significant stenosis CAROTID ARTERIES: Grossly patent without significant stenosis VERTEBRAL ARTERIES: Grossly patent without significant stenosis     Within limitations of motion degradation, the major arteries of the neck appear grossly patent without significant stenosis. If clinical concerns persist, can consider correlation with contrast enhanced CT neck.        PHYSICAL EXAM:   GCS:  4 - Opens eyes on own   6 - Follows simple motor commands  5 - Alert and oriented    Pupil size:  Left 4 mm Right 4 mm  Pupil reaction: Yes  Wiggles fingers: Left Yes Right Yes  Hand grasp:   Left normal   Right normal  Wiggles toes: Left Yes    Right Yes  Plantar flexion: Left normal  Right normal    /74   Pulse 100   Temp 98.1 °F (36.7 °C) (Oral)   Resp 24   Ht 6' 2\" (1.88 m)   Wt 210 lb (95.3 kg)   SpO2 99%   BMI 26.96 kg/m²   General appearance: alert, cooperative and mild distress  Head: Normocephalic, without obvious abnormality  Neck: supple, symmetrical, trachea midline, thyroid not enlarged, symmetric, no tenderness/mass/nodules and C collar in place.  Patient pulling repeatedly at C collar, having difficulty staying still on the bed  Lungs: clear to auscultation bilaterally  Chest wall: no tenderness  Heart: regular rate and rhythm, S1, S2 normal, no murmur, click, rub or gallop  Abdomen: soft, non-tender; bowel sounds normal; no masses,  no organomegaly  Extremities: extremities normal, atraumatic, no cyanosis or edema  Pulses: 2+ and symmetric  Neurologic: Grossly normal. Patient agitated, pulling at C collar, having difficulty lying still on the bed      Spine:     Spine Tenderness ROM   Cervical 5 /10 Abnormal, C collar in place, C spine fracture   Thoracic 0 /10 Normal   Lumbar 0 /10 Normal     Musculoskeletal    Joint Tenderness Swelling ROM   Right shoulder absent absent normal   Left shoulder absent absent normal   Right elbow absent absent normal   Left elbow absent absent normal   Right wrist absent absent normal   Left wrist absent absent normal   Right hand grasp absent absent normal   Left hand grasp absent absent normal   Right hip absent absent normal   Left hip absent absent normal   Right knee absent absent normal   Left knee absent absent normal   Right ankle absent absent normal   Left ankle absent absent normal   Right foot absent absent normal   Left foot absent absent normal       CONSULTS: Neurosurgery    INJURIES:        Patient Active Problem List   Diagnosis    Clostridium difficile diarrhea    Chronic back pain    Insomnia    Moderate persistent asthma with acute exacerbation    Mixed hyperlipidemia    Acute bronchitis    Adverse drug reaction    Liver disease, chronic, due to alcohol ([facility] [address]    COPD (chronic obstructive pulmonary disease) with chronic bronchitis (HCC)    Obstructive sleep apnea    Paroxysmal SVT (supraventricular tachycardia) (HCC)    Mild persistent asthma without complication    Dysphagia    Candida esophagitis (HCC)    History of cervical fracture         Assessment/Plan:     Will plan for intubation, patient agitated with concern for airway protection giving cervical collar and injury, inability to lay flat, inability to clear his secretions  MRI/MRA pending    Please refer to most recent trauma surgery progress note

## 2022-04-19 NOTE — ACP (ADVANCE CARE PLANNING)
Patient states he has children and all would have equal say in his decision making if he were incapacitated. Called and talked with daughter Janell Hoskins, she will notify her sister clint.   They are aware of his etoh abuse and we discussed seriousness of this condition requiring possible intubation, ICU care and further life threatening conditions

## 2022-04-19 NOTE — ED NOTES
Roger Otero in room and transported to [facility]. Karma Dao RN  04/19/22 [id number]

## 2022-04-19 NOTE — ED TRIAGE NOTES
Patient called 911 after falling down stairs tonight. [facility] does not know how many stairs. Patient has had ETOH tonight. [facility] has been drinking steadily for two weeks and has had many falls. Complains of pain to neck and across shoulders. Squad states patient refused immobilization.

## 2022-04-19 NOTE — ED PROVIDER NOTES
[facility]  ED  Emergency Department Encounter  Emergency Medicine Resident     Pt Name: Kelley Webster  YMT:[id number]  [place] 1955  Date of evaluation: 4/19/22  PCP:  Felecia Espinoza MD    [address] [place] [address]       Chief Complaint   Patient presents with    Fall     transfer from [place] [address]  (Location/Symptom, Timing/Onset, Context/Setting, Quality, Duration, ModifyingFactors, Severity.)      Kelley Webster is a 75 y.o. male presents as transfer from [facility] due to C6/7 fracture. Patient had fall yesterday and was down overnight. Believes he passed out but has no memory of the fall. Presented to [facility] today where he was found to have cervical fracture as well as alcoholic ketoacidosis. Treated with D5 half-normal saline. Transferred for neurosurgical evaluation. Pan scan otherwise without acute traumatic injury. Did note jejunal intussusception however patient has no abdominal pain, vomiting. Patient complaining of right shoulder pain as well as cervical pain. Paresthesias in right hand but no weakness or numbness. No anticoagulation use. Last tetanus 2020. No chest pain, shortness of breath, abdominal pain, vomiting. PAST MEDICAL / SURGICAL / SOCIAL /FAMILY HISTORY      has a past medical history of Asthma, Calculus of gallbladder without mention of cholecystitis or obstruction, Chronic back pain, Hyperlipidemia, Insomnia, unspecified, Spinal stenosis, unspecified region other than cervical, and Urinary incontinence. No other pertinent PMH on review with patient/guardian. has a past surgical history that includes Tonsillectomy; Colonoscopy; pr colonoscopy flx dx w/collj spec when pfrmd (N/A, 8/29/2018); and Upper gastrointestinal endoscopy (8/29/2018). No other pertinent PSH on review with patient/guardian.   Social History     Socioeconomic History    Marital status:      Spouse name: Not on file    Number of children: Not on file    Years of education: Not on file    Highest education level: Not on file   Occupational History    Not on file   Tobacco Use    Smoking status: Former Smoker     Packs/day: 1.00     Years: 24.00     Pack years: 24.00     Types: Cigarettes     Quit date:      Years since quittin.3    Smokeless tobacco: Never Used   Vaping Use    Vaping Use: Never used   Substance and Sexual Activity    Alcohol use: Yes     Comment: daily drinker (vodka)    Drug use: No    Sexual activity: Not on file   Other Topics Concern    Not on file   Social History Narrative    Not on file     Social Determinants of Health     Financial Resource Strain:     Difficulty of Paying Living Expenses: Not on file   Food Insecurity:     Worried About [address] in the Last Year: Not on file    [address] [employer] in the Last Year: Not on file   Transportation Needs:     Lack of Transportation (Medical): Not on file    Lack of Transportation (Non-Medical):  Not on file   Physical Activity:     Days of Exercise per Week: Not on file    Minutes of Exercise per Session: Not on file   Stress:     Feeling of Stress : Not on file   Social Connections:     Frequency of Communication with Friends and Family: Not on file    Frequency of Social Gatherings with Friends and Family: Not on file    Attends [employer] Services: Not on file    Active Member of [address] [place] [address] or Organizations: Not on file    Attends Club or Organization Meetings: Not on file    Marital Status: Not on file   Intimate Partner Violence:     Fear of Current or Ex-Partner: Not on file    Emotionally Abused: Not on file    Physically Abused: Not on file    Sexually Abused: Not on file   Housing Stability:     Unable to Pay for Housing in the Last Year: Not on file    Number of [place] in the Last Year: Not on file    Unstable Housing in the Last Year: Not on file       I counseled the patient against using tobacco products. History reviewed. No pertinent family history. No other pertinent FamHx on review with patient/guardian. Allergies:  Seasonal and Cat hair extract    Home Medications:  Prior to Admission medications    Medication Sig Start Date End Date Taking? Authorizing Provider   traZODone (DESYREL) 150 MG tablet take 1 tablet by mouth at bedtime 4/12/22   Azeem Dunbar MD   pantoprazole (PROTONIX) 40 MG tablet Take 1 tablet by mouth daily 2/22/22   Cornelio Mckinnon MD   pantoprazole (PROTONIX) 40 MG tablet take 1 tablet by mouth every morning before breakfast 2/21/22   Etelvina Bautista MD   atorvastatin (LIPITOR) 20 MG tablet Take 1 tablet by mouth daily 2/21/22   Etelvina Bautista MD   albuterol sulfate  (90 Base) MCG/ACT inhaler inhale 1 puff by mouth and INTO THE LUNGS every 4 hours if needed for wheezing 2/1/22   Azeem Dunbar MD   montelukast (SINGULAIR) 10 MG tablet Take 1 tablet by mouth nightly 1/5/22   Azeem Dunbar MD   BREO ELLIPTA 200-25 MCG/INH AEPB inhaler Inhale 1 puff into the lungs daily 1/5/22   Azeem Dunbar MD   azelastine (ASTELIN) 0.1 % nasal spray 1 spray by Nasal route 2 times daily Use in each nostril as directed 1/5/22   Azeem Dunbar MD   clobetasol (TEMOVATE) 0.05 % ointment Apply topically 2 times daily.  8/5/21   Azeem Dunbar MD   clindamycin (CLEOCIN) 150 MG capsule take 1 capsule by mouth three times a day  Patient not taking: Reported on 8/5/2021 5/21/21   Historical Provider, MD   ibuprofen (ADVIL;MOTRIN) 800 MG tablet take 1 tablet by mouth three times a day  Patient not taking: Reported on 8/5/2021 5/21/21   Historical Provider, MD   penicillin v potassium (VEETID) 500 MG tablet take 1 tablet by mouth three times a day  Patient not taking: Reported on 8/5/2021 5/1/21   Historical Provider, MD   PREVIDENT 5000 ENAMEL PROTECT 1.1-5 % PSTE APPLY TO TEETH,SWISH FOR 30 SECONDS THEN EXPECTORATE ONCE DAILY DO NOT EAT OR DRINK FOR 30 MINUTES 6/6/21   Historical Provider, MD   nystatin (MYCOSTATIN) 445840 UNIT/ML suspension Take 5 mLs by mouth 4 times daily 9/19/18   Vel Foster MD   ibuprofen (ADVIL;MOTRIN) 400 MG tablet Take 400 mg by mouth every 6 hours as needed for Pain. Historical Provider, MD       REVIEW OF SYSTEMS    (2-9 systems for level 4, 10 ormore for level 5)      Review of Systems   Constitutional: Negative for fever. Eyes: Negative for visual disturbance. Respiratory: Negative for shortness of breath. Cardiovascular: Negative for chest pain. Gastrointestinal: Negative for abdominal pain, nausea and vomiting. Genitourinary: Negative for hematuria. Musculoskeletal: Positive for neck pain. Negative for back pain. Right shoulder pain   Skin: Negative for wound. Allergic/Immunologic: Negative for immunocompromised state. Neurological: Negative for dizziness, weakness, numbness and headaches. Hematological: Does not bruise/bleed easily. Psychiatric/Behavioral: Negative for confusion. PHYSICAL EXAM   (up to 7 for level 4, 8 or more for level 5)      INITIAL VITALS:   /85   Pulse 105   Temp 98.1 °F (36.7 °C) (Oral)   Resp 18   Ht 6' 2\" (1.88 m)   Wt 210 lb (95.3 kg)   SpO2 100%   BMI 26.96 kg/m²     Physical Exam  Constitutional:       General: He is not in acute distress. Appearance: Normal appearance. HENT:      Head: Normocephalic and atraumatic. Right Ear: Tympanic membrane, ear canal and external ear normal.      Left Ear: Tympanic membrane, ear canal and external ear normal.   Eyes:      General:         Right eye: No discharge. Left eye: No discharge. Neck:      Comments: Aspen collar in place  Cardiovascular:      Rate and Rhythm: Normal rate and regular rhythm. Pulses: Normal pulses. Heart sounds: No murmur heard. Pulmonary:      Effort: Pulmonary effort is normal. No respiratory distress. Breath sounds: Normal breath sounds. No wheezing, rhonchi or rales. Abdominal:      Palpations: Abdomen is soft. Tenderness: There is no abdominal tenderness. Musculoskeletal:      Cervical back: No muscular tenderness. Comments: Abrasions to left arm, left knee. Mild tenderness of right shoulder. No tenderness of thoracic/lumbar spine. Pelvis stable. No tenderness of LUE/BLE. Skin:     Capillary Refill: Capillary refill takes less than 2 seconds. Neurological:      General: No focal deficit present. Mental Status: He is alert. Comments: 5/5 strength BUE/BLE.   Sensation intact of paresthesias to right hand       DIFFERENTIAL  DIAGNOSIS     PLAN (LABS / IMAGING / EKG):  Orders Placed This Encounter   Procedures    XR SHOULDER RIGHT (MIN 2 VIEWS)    MRI CERVICAL SPINE WO CONTRAST    XR ACUTE ABD SERIES CHEST 1 VW    MRA NECK WO CONTRAST    XR CHEST PORTABLE    Troponin    Brain Natriuretic Peptide    Basic Metabolic Panel w/ Reflex to MG    CBC with Auto Differential    Protime-INR    APTT    CBC    Magnesium    Phosphorus    Comprehensive Metabolic Panel    Lactic Acid    Diet NPO    Vital signs per unit routine    Notify patient's primary care physician of admission    Place intermittent pneumatic compression device    Notify physician    Telemetry monitoring - 48 hour duration    Smyth / urology care    Nasogastric Tube Insertion    Nasogastric tube maintenance    Full Code    Inpatient consult to Neurosurgery    Inpatient consult to Trauma Surgery    OT eval and treat    PT evaluation and treat    Initiate Oxygen Therapy Protocol    Mechanical ventilation    ABG draw    End Tidal CO2 Continuous    Pulse oximetry, continuous    Speech language pathology evaluation    EKG 12 Lead    EKG 12 Lead    TYPE AND SCREEN    ADMIT TO INPATIENT    Restraints non-violent or non-self-destructive    Alcohol and or drug assessment    Seizure precautions    Fall precautions     MEDICATIONS ORDERED:  Orders Placed This Encounter   Medications    DISCONTD: thiamine (B-1) 100 mg in sodium chloride 0.9 % 100 mL IVPB    fentaNYL (SUBLIMAZE) injection 50 mcg    lactated ringers bolus    DISCONTD: sodium chloride flush 0.9 % injection 5-40 mL    sodium chloride flush 0.9 % injection 5-40 mL    DISCONTD: 0.9 % sodium chloride infusion    OR Linked Order Group     ondansetron (ZOFRAN-ODT) disintegrating tablet 4 mg     ondansetron (ZOFRAN) injection 4 mg    DISCONTD: polyethylene glycol (GLYCOLAX) packet 17 g    lactated ringers infusion    DISCONTD: acetaminophen (TYLENOL) tablet 1,000 mg    DISCONTD: oxyCODONE (ROXICODONE) immediate release tablet 5 mg    DISCONTD: fentaNYL (SUBLIMAZE) injection 25 mcg    DISCONTD: fentaNYL (SUBLIMAZE) injection 50 mcg    DISCONTD: bisacodyl (DULCOLAX) suppository 10 mg    DISCONTD: sennosides-docusate sodium (SENOKOT-S) 8.6-50 MG tablet 1 tablet    DISCONTD: gabapentin (NEURONTIN) capsule 300 mg    DISCONTD: methocarbamol (ROBAXIN) tablet 750 mg    thiamine (B-1) 500 mg in sodium chloride 0.9 % 100 mL IVPB    LORazepam (ATIVAN) injection 1 mg    ondansetron (ZOFRAN) injection 4 mg    DISCONTD: LORazepam (ATIVAN) injection 1 mg    propofol injection     Order Specific Question:   Titrate Infusion? Answer:   Yes     Order Specific Question:   Initial Infusion Dose:      Answer:   20 mcg/kg/min     Order Specific Question:   Goal of Therapy:     Answer:   RASS of -1 to 0     Order Specific Question:   Contact Provider if:     Answer:   New onset HR less than 50 bpm     Order Specific Question:   Contact Provider if:     Answer:   New onset SBP less than 90 mmHg     Order Specific Question:   Contact Provider if:     Answer:   Patient is receiving maximum dose and is not achieving the goal of therapy     Order Specific Question:   Contact Provider if:     Answer:   Triglycerides greater than 500 mg/dL    DISCONTD: fentaNYL 20 mcg/mL Infusion     Order Specific Question: Titrate Infusion? Answer:   No     Order Specific Question:   Infusion Dose: Answer:   50 mcg/hr    diazePAM (VALIUM) tablet 10 mg    DISCONTD: midazolam (VERSED) 1 mg/mL in D5W infusion     Order Specific Question:   Titrate Infusion? Answer:   Yes     Order Specific Question:   Initial Infusion Dose: Answer:   2 mg/hr     Order Specific Question:   Goal of Therapy is: Answer:   RASS of -1 to 0     Order Specific Question:   Contact Provider if:     Answer:   New onset SBP less than 90 mmHg     Order Specific Question:   Contact Provider if:     Answer:   Patient is receiving the maximum dose and is not achieving the goal of therapy    midazolam (VERSED) 1 mg/mL in D5W infusion     Order Specific Question:   Titrate Infusion? Answer:   Yes     Order Specific Question:   Initial Infusion Dose: Answer:   2 mg/hr     Order Specific Question:   Goal of Therapy is: Answer:   RASS of -1 to 0     Order Specific Question:   Contact Provider if:     Answer:   New onset SBP less than 90 mmHg     Order Specific Question:   Contact Provider if:     Answer:   Patient is receiving the maximum dose and is not achieving the goal of therapy    fentaNYL 20 mcg/mL Infusion     Order Specific Question:   Titrate Infusion? Answer:   Yes     Order Specific Question:   Initial Infusion Dose: Answer:   48 mcg/hr     Order Specific Question:   Goal of Therapy is:      Answer:   RASS of -1 to 1     Order Specific Question:   Contact Provider if:     Answer:   Patient is receiving the maximum dose and is not achieving the goal of therapy    fentaNYL (SUBLIMAZE) injection 100 mcg    atorvastatin (LIPITOR) tablet 20 mg    pantoprazole (PROTONIX) tablet 40 mg     DIAGNOSTIC RESULTS / EMERGENCY DEPARTMENT COURSE / MDM     LABS:  Results for orders placed or performed during the hospital encounter of 04/19/22   Troponin   Result Value Ref Range    Troponin, High Sensitivity 21 0 - 22 ng/L   Troponin Result Value Ref Range    Troponin, High Sensitivity 22 0 - 22 ng/L   Brain Natriuretic Peptide   Result Value Ref Range    Pro-BNP 85 <300 pg/mL   Protime-INR   Result Value Ref Range    Protime 10.7 9.1 - 12.3 sec    INR 1.0    APTT   Result Value Ref Range    PTT 25.5 20.5 - 30.5 sec   TYPE AND SCREEN   Result Value Ref Range    Expiration Date 04/22/2022,2359     Arm Band Number BE [id number]     ABO/Rh O NEGATIVE     Antibody Screen NEGATIVE        IMPRESSION/MDM/ED COURSE:  75 y.o. male presented as transfer from [facility] due to C6/7 fracture and alcoholic ketoacidosis. Patient arrives in [place] collar. Mildly tachycardic and hypertensive at 142/65 with . Patient mildly uncomfortable but nontoxic and in no acute distress. Heart slightly tachycardic but regular, lungs clear. Abrasions to left arm, left knee. Mild tenderness of right shoulder. No tenderness of thoracic/lumbar spine. Pelvis stable. No tenderness of LUE/BLE. Paresthesias of right hand but present sensation and strength intact. Pan scan completed at outside hospital.  We will add right shoulder x-ray. High anion gap acidosis with CO2 of 7 and anion gap of 24 at [facility]. CK 1332. EKG, troponin, BNP due to questionable syncope. Neurosurgery and trauma consultation. ED Course as of 04/19/22 1216   Tue Apr 19, 2022   0829 Troponin:    Troponin, High Sensitivity 21 [AF]   0829 Brain Natriuretic Peptide:    Pro-BNP 85 [AF]   0830 Patient seen and evaluated by neurosurgery who plans for surgery later today. Also seen by trauma who will discuss with their team, plan for admission to trauma   [AF]   0830 IMPRESSION:  1. Moderate right glenohumeral osteoarthrosis. 2. Mild degenerative changes of the right AC joint. No acute fracture or  dislocation. [AF]   1037 Patient slightly agitated with gurgling breath sounds, likely alcohol withdrawal.  Will give 1 mg Ativan and Zofran 4 mg.   If symptoms not improve may require intubation. Trauma attending at bedside [AF]   1115 No improvement following Ativan. Decision to intubate with etomidate and succinylcholine. Patient did have significant airway edema however able to intubate with 7.5 ET tube. Patient placed on propofol but inadequate sedation after titrating up to 50 mcg/min so Versed drip was added. Pending CXR [AF]   8921 Patient on Versed drip continued thrashing. Additional 40 mcg bolus given, trauma attending at bedside [AF]   1215 Patient adequately sedated with propofol 50 and Versed 5. Patient's daughter is. Updated patient condition and plan of care by myself and neurosurgery. All questions answered. [AF]      ED Course User Index  [AF] Bree Aleman DO     RADIOLOGY:  XR ACUTE ABD SERIES CHEST 1 VW   Final Result   No acute cardiopulmonary process. Nonobstructive bowel gas pattern. XR SHOULDER RIGHT (MIN 2 VIEWS)   Final Result   1. Moderate right glenohumeral osteoarthrosis. 2. Mild degenerative changes of the right AC joint. No acute fracture or   dislocation. MRI CERVICAL SPINE WO CONTRAST    (Results Pending)   MRA NECK WO CONTRAST    (Results Pending)   XR CHEST PORTABLE    (Results Pending)     EKG  Sinus tachycardia, ventricular rate 101. Normal axis. No ST elevation or depression. No Brugada. No WPW. No long QT.     All EKG's are interpreted by the Emergency Department Physician who either signs or Co-signs this chart in the absence of a crardiologist.    PROCEDURES:  PROCEDURE NOTE - EMERGENCY INTUBATION    PATIENT NAME: Claudia Landin  MEDICAL RECORD NO. [id number]  DATE: 4/19/2022  ATTENDING PHYSICIAN: Dr. Pito Guillermo DIAGNOSIS:  Acute Respiratory Failure  POSTOPERATIVE DIAGNOSIS:  Same  PROCEDURE PERFORMED:  Emergency endotracheal intubation  PERFORMING PHYSICIAN: Bree Aleman DO    MEDICATIONS: etomidate intravenously and succinycholine intravenously    DISCUSSION:  Claudia Landin is a 75y.o.-year-old male who requires intubation and ventilatory support due to impending airway compromise. The history and physical examination were reviewed and confirmed. CONSENT: The patient provided verbal consent for this procedure. PROCEDURE:  A timeout was initiated by the bedside nurse and was confirmed by those present. The patient was placed in the appropriate position with cervical spine immobilization maintained throughout the procedure. Cricoid pressure was utilized. Patient had significant airway edema. Intubation was performed by direct laryngoscopy using a laryngoscope and a 7.5 cuffed endotracheal tube. The cuff was then inflated and the tube was secured appropriately at a distance of 22 cm to the dental ridge. Initial confirmation of placement included bilateral breath sounds, an end tidal CO2 detector, absence of sounds over the stomach and tube fogging. A chest x-ray to verify correct placement of the tube has been ordered but is still pending. The patient tolerated the procedure well. COMPLICATIONS:  None     Dario Sebastian DO  12:16 PM, 4/19/22    CONSULTS:  IP CONSULT TO NEUROSURGERY  IP CONSULT TO TRAUMA SURGERY  IP CONSULT TO DIETITIAN    FINAL IMPRESSION      1. Closed nondisplaced fracture of sixth cervical vertebra, unspecified fracture morphology, initial encounter ([facility] [address]    2. Closed nondisplaced fracture of seventh cervical vertebra, unspecified fracture morphology, initial encounter ([facility] [address]    3. Alcoholic ketoacidosis        DISPOSITION / PLAN     DISPOSITION Admitted 04/19/2022 09:09:13 AM      PATIENT REFERREDTO:  No follow-up provider specified.     DISCHARGE MEDICATIONS:  New Prescriptions    No medications on file       Scarlet Love DO  PGY 2  Resident Physician Emergency Medicine  04/19/22 12:16 PM    (Please note that portions of this note were completed with a voice recognition program.Efforts were made to edit the dictations but occasionally words are mis-transcribed.)       Carissa Quintero,   Resident  04/19/22 Luiz Ware,   Resident  04/19/22 [id number]

## 2022-04-19 NOTE — ED PROVIDER NOTES
I did not see or evaluate this patient, signed onto this patient and there     Bandar Jaramillo MD  Resident  04/19/22 [id number]
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
## 2022-04-19 NOTE — ED NOTES
Returns from CT via cart. [place] collar remains in place. Patient cooperative with care at this time. Call light in reach.      Soumya Salmon RN  04/19/22 8799

## 2022-04-20 NOTE — PLAN OF CARE
Output by Drain (mL) 04/18/22 0701 - 04/18/22 1900 04/18/22 1901 - 04/19/22 0700 04/19/22 0701 - 04/19/22 1900 04/19/22 1901 - 04/20/22 0700 04/20/22 0701 - 04/20/22 1642   Closed/Suction Drain Anterior Neck Bulb 7 [place]    10 10       Drain Removal Note    []Subdural Drain   [x]Subfascial Drain  []Ventriculostomy Drain    Drain removed from suction, prepped with betadine and sterile towels placed to create sterile field. Drain suture cut and drain removed. A 2x2 and tegaderm placed over drain hole with hemostasis. No complications, patient tolerated procedure well.     --  Gale Diaz CNP  4:42 PM EDT

## 2022-04-20 NOTE — PROGRESS NOTES
ICU PROGRESS NOTE    PATIENT NAME: Kelley Webster  MEDICAL RECORD NO. [id number]  DATE: 4/20/2022    PRIMARY CARE PHYSICIAN: Felecia Espinoza MD    HD: # 1    ASSESSMENT    Patient Active Problem List   Diagnosis    Clostridium difficile diarrhea    Chronic back pain    Insomnia    Moderate persistent asthma with acute exacerbation    Mixed hyperlipidemia    Acute bronchitis    Adverse drug reaction    Liver disease, chronic, due to alcohol ([facility] [address]    COPD (chronic obstructive pulmonary disease) with chronic bronchitis (HCC)    Obstructive sleep apnea    Paroxysmal SVT (supraventricular tachycardia) (HCC)    Mild persistent asthma without complication    Dysphagia    Candida esophagitis (HCC)    History of cervical fracture    Closed fracture of cervical vertebra (HCC)    Cervical radiculopathy     MEDICAL DECISION MAKING AND PLAN    Neuro:  - GCS 9T, nodding to questions appropriately, following commands  - Pain/sedation: propofol, femtanyl gtt, valium 10mg q6, Tylenol 1g q8  - Daily ETOH use  - Thiamine and folic acid   - CTH neg for acute injuries  - CT C spine showing C6-7 fx, MRI C spine showing epidural hemorrhage at C6-7 amd prevertebral soft tissue swelling    - MRA neck showing no significant stenosis   - Intubated due to stridor, DL showing some airway edema   - POD#1 ORIF C6-7 by neurosurgery   - SBP <140 per nsg    CV  - -131  - MAP 73-99, no pressor requirements  - Hydralazine 10mg q6 prn for SBP goal <140 per nsg  - Troponin 21, EKG stable  - No significant cardiac hx on chart, to clarify with family    Pulm  - Intubated PRVC 18/620/10/45%  - ABG 7.33/38.8/417/20  -   - CXR showing no significant process, ETT in correct position  - Possible SBT if mentation allows  - Unknown pulm hx, will clarify with family    GI/Nutrition  - Diet npo, will advance tube feeds if unable to extubate  - Consider NGT if unable to extubate  - Ppx: protonix, glycolax  - CT AP showing no acute traumatic injuries, diverticulosis with jejunal intussusception, small fat containing umbilical hernia    Renal/lytes  - BUN/Cr 11/0.58  - Na/K 142/4.1  - Mg/P 1.8/2.0, phos replaced  - 120 ml/hr LR maintenance, IBW 80kg  - I/Os 4462/1070 in last 24h, 0.7 cc/kg/hr  - 2L pos since admission    Heme  - Hb 11.8 (12.6)  - Plt 208 (239)  - DVT ppx per nsg recs post op    7. Endocrine        - -136        - Continue to monitor, no known hx DM    Micro  - Tmax 37.9  - WBC 7.9 (8.1)  - Monitor off antimicrobials    Family/dispo  - SBT  - Remain in ICU, monitor for withdrawals    Lines  - ETT  - PIV  - A line  - Smyth    CHECKLIST    CAM-ICU RASS: 0  RESTRAINTS: b/l soft wrist  IVF: 120 ml/hr LR  NUTRITION: npo  ANTIBIOTICS: n/a  GI: protonix  DVT: holding for nsg  GLYCEMIC CONTROL: n/a  HOB >45: n/a, C spine   MOBILITY: bedrest  SBT: yes  IS: n/a    SUBJECTIVE    Blaine Henson remains intubated and sedated. Able to open eyes and follow commands, nodding to questions appropriately. Calm and arousable, no acute distress.     OBJECTIVE  VITALS: Temp: Temp: 100.2 °F (37.9 °C)Temp  Av.6 °F (37 °C)  Min: 96.7 °F (35.9 °C)  Max: 100.4 °F (38 °C) BP Systolic (43YRJ), NUU:348 , Min:84 , SGR:394   Diastolic (25SJU), KUT:17, Min:57, Max:121   Pulse Pulse  Av.9  Min: 90  Max: 131 Resp Resp  Avg: 15.6  Min: 0  Max: 25 Pulse ox SpO2  Av.4 %  Min: 90 %  Max: 100 %    CONSTITUTIONAL: Intubated, sedated, GCS 9T, following commands  HEENT: PERRANDREW ESTEVES collar in place  LUNGS: clear bilaterally in supine position  CV: HRRR  GI: Abdomen soft  MUSCULOSKELETAL: intact  NEUROLOGIC: GCS 9T, following commands, nodding to questions, moving all 4 extremities spontaneously  SKIN: intact    LAB:  CBC:   Recent Labs     04/19/22  0445 22  0755   WBC 8.1 7.9   HGB 12.6* 11.8*   HCT 37.4* 36.2*   MCV 91.3 93.5    208     BMP:   Recent Labs     22  0445 22  0755    136   K 4.0 4.2   CL 95* 100   CO2 17* 14*   BUN 7* 6*   CREATININE 0.59* 0.49*   GLUCOSE 136* 134*     RADIOLOGY:  CXR: 04/20/22   Impression   1. The endotracheal tube tip is located 9.7 cm above the lisa. This could   be advanced approximately 3-4 cm for more optimal placement. 2. Worsening atelectasis in the right lung base. 3. The orogastric tube tip now terminates below the diaphragm, incompletely   imaged. RT advanced ETT 4cm post CXR      Estelle Hwang MD  4/20/22, 4:59 AM            Trauma Attending Attestation      I have reviewed the above GCS note(s) and confirmed the key elements of the medical history and physical exam. I have seen and examined the pt. I have discussed the findings, established the care plan and recommendations with Resident, GCS RN, bedside nurse.         Chelo Mayo DO  4/23/2022  6:07 AM

## 2022-04-20 NOTE — PROGRESS NOTES
Neurosurgery MARIO/Resident    Daily Progress Note   CC:  Chief Complaint   Patient presents with    Fall     transfer from [facility]      4/20/2022  9:35 AM    Chart reviewed. No acute events overnight. No new complaints. Remains intubated with sedation infusion (propofol and fentanyl)    Vitals:    04/20/22 0700 04/20/22 0739 04/20/22 0752 04/20/22 0800   BP:       Pulse: 83 81  97   Resp: 18 18 18 22   Temp:    99.7 °F (37.6 °C)   TempSrc:    Oral   SpO2: 98% 98% 99% 100%   Weight:       Height:           PE: Intubated  Alert  Following commands  Right tricep weakness same as prior to surgery   Paresthesias both hands  Drain - 10ml/12 hours   Incision: clean dry intact           Lab Results   Component Value Date    WBC 5.7 04/20/2022    HGB 10.1 (L) 04/20/2022    HCT 32.1 (L) 04/20/2022     04/20/2022    CHOL 100 08/19/2021    TRIG 82 08/19/2021    HDL 41 08/19/2021    ALT 67 (H) 04/19/2022    AST 87 (H) 04/19/2022     04/20/2022    K 4.1 04/20/2022     04/20/2022    CREATININE 0.58 (L) 04/20/2022    BUN 11 04/20/2022    CO2 22 04/20/2022    TSH 3.23 02/27/2017    PSA 1.21 04/10/2019    INR 1.0 04/19/2022    LABA1C 6.1 (H) 09/18/2020    CRP 4.8 02/27/2017         A/P  75 y.o. male who presents with fall, C6-7 disc disruption, C7 radiculopathy  POD#1 s/p C7 corpectomy, ACDF C6-7     Collar on at all times except ok to remove for hygiene  Ok for DVT prophylaxis  PT and OT  Neuro checks per floor protocol  Maintain SHAWN drain at this time       Please contact neurosurgery with any changes in patients neurologic status.        Jose Guadalupe Okeefe CNP  4/20/22  9:35 AM

## 2022-04-20 NOTE — ANESTHESIA POSTPROCEDURE EVALUATION
Department of Anesthesiology  Postprocedure Note    Patient: Clay Dunbar  MRN: [id number]  [place]: 1955  Date of evaluation: 4/19/2022  Time:  10:07 PM     Procedure Summary     Date: 04/19/22 Room / Location: [address]    Anesthesia Start: 1528 Anesthesia Stop: 2050    Procedure: C7 CORPECTOMY (N/A ) Diagnosis: (C-6 FRACTURE)    Surgeons: Eliezer Corona DO Responsible Provider: Brice Yee MD    Anesthesia Type: general ASA Status: 3          Anesthesia Type: general    Carol Phase I:      Carol Phase II:      Last vitals: Reviewed and per EMR flowsheets.      POST-OP ANESTHESIA NOTE       /74   Pulse 95   Temp 97.5 °F (36.4 °C) (Axillary)   Resp 20   Ht 6' 2\" (1.88 m)   Wt 210 lb (95.3 kg)   SpO2 100%   BMI 26.96 kg/m²       Pain Level: 10         Anesthesia Post Evaluation    Patient location during evaluation: ICU  Patient participation: complete - patient cannot participate  Level of consciousness: sedated and ventilated  Pain score: 10  Airway patency: patent  Nausea & Vomiting: no vomiting and no nausea  Complications: no  Cardiovascular status: hemodynamically stable  Respiratory status: ventilator and intubated  Hydration status: stable

## 2022-04-20 NOTE — PROGRESS NOTES
[facility])  Occupational Therapy Not Seen Note    DATE: 2022    NAME: Ines Navarrete  MRN: [id number]   : 1955      Patient not seen this date for Occupational Therapy due to:    Patient is not appropriate for active participation in OT evaluation/treatment at this time d/t intubated and sedated     Next Scheduled Treatment: check back 2022    Electronically signed by RY Yo on 2022 at 10:51 AM

## 2022-04-20 NOTE — PROGRESS NOTES
Comprehensive Nutrition Assessment    Type and Reason for Visit:  Initial,Consult (Auto consult for TF recs d/t vent)    Nutrition Recommendations/Plan:   Start nutrition as able; if TF needed, suggest Immune Enhancing formula goal 55 mL/hr to provide 1980 kcal and 124 g pro/day (+additional kcals from propofol). Will follow/monitor care plans. Malnutrition Assessment:  Malnutrition Status:  Insufficient data (04/20/22 1117)    Context:  Acute Illness     Findings of the 6 clinical characteristics of malnutrition:  Energy Intake:  Unable to assess  Weight Loss:  Unable to assess     Body Fat Loss:  Unable to assess     Muscle Mass Loss:  Unable to assess    Fluid Accumulation:  No significant fluid accumulation (per RN documentation)     Strength:  Not Performed    Nutrition Assessment:    Pt admitted with cervical fracture s/p fall, alcoholic ketoacidosis. PMH includes: Alcohol abuse, Liver disease, HTN, HLD, Asthma, COPD. Pt is currently intubated. POD#1 s/p C7 corpectomy, ACDF C6-7. No nutrition support at present; noted plan for initiation of TF if pt is not extubated. Meds/labs reviewed. Nutrition Related Findings:      Wound Type: Surgical Incision (neck)       Current Nutrition Intake & Therapies:    Average Meal Intake: NPO  Average Supplements Intake: NPO  Diet NPO  Additional Calorie Sources:  · Propofol at 11.4 mL/hr =301 kcal/day    Anthropometric Measures:  Height: 6' 2\" (188 cm)  Ideal Body Weight (IBW): 190 lbs (86 kg)    Current Body Weight: 233 lb 11 oz (106 kg), 123 % IBW. Weight Source: Bed Scale  Current BMI (kg/m2): 30  BMI Categories: Obese Class 1 (BMI 30.0-34. 9)    Estimated Daily Nutrient Needs:  Energy Requirements Based On: Kcal/kg  Weight Used for Energy Requirements: Current  Energy (kcal/day): 2300 kcal/day  Weight Used for Protein Requirements: Ideal  Protein (g/day): 130 g pro/day  Method Used for Fluid Requirements: Other (Comment)  Fluid (ml/day): per MD    Nutrition Diagnosis:   · Inadequate oral intake related to impaired respiratory function,acute injury/trauma as evidenced by NPO or clear liquid status due to medical condition    Nutrition Interventions:   Food and/or Nutrient Delivery: Start nutrition as able; if TF needed, suggest Immune Enhancing formula goal 55 mL/hr to provide 1980 kcal and 124 g pro/day (+additional kcals from propofol). Nutrition Education/Counseling: No recommendation at this time  Coordination of Nutrition Care: Continue to monitor while inpatient    Goals:  Goals: Meet at least 75% of estimated needs,within 7 days    Nutrition Monitoring and Evaluation:   Behavioral-Environmental Outcomes: None Identified  Food/Nutrient Intake Outcomes: Diet Advancement/Tolerance  Physical Signs/Symptoms Outcomes: Biochemical Data,Nutrition Focused Physical Findings,Skin,Weight    Discharge Planning:     Too soon to determine     [address], LD  Contact: [phone]

## 2022-04-20 NOTE — PLAN OF CARE
Problem: Non-Violent Restraints  Goal: No harm/injury to patient while restraints in use  Outcome: Met This Shift  Goal: Patient's dignity will be maintained  Outcome: Met This Shift     Problem: Confusion - Acute:  Goal: Absence of continued neurological deterioration signs and symptoms  Description: Absence of continued neurological deterioration signs and symptoms  Outcome: Ongoing  Goal: Mental status will be restored to baseline  Description: Mental status will be restored to baseline  Outcome: Ongoing     Problem: Discharge Planning:  Goal: Ability to perform activities of daily living will improve  Description: Ability to perform activities of daily living will improve  Outcome: Ongoing  Goal: Participates in care planning  Description: Participates in care planning  Outcome: Ongoing     Problem: Injury - Risk of, Physical Injury:  Goal: Absence of physical injury  Description: Absence of physical injury  Outcome: Ongoing  Goal: Will remain free from falls  Description: Will remain free from falls  Outcome: Ongoing     Problem: Mood - Altered:  Goal: Mood stable  Description: Mood stable  Outcome: Ongoing  Goal: Absence of abusive behavior  Description: Absence of abusive behavior  Outcome: Ongoing  Goal: Verbalizations of feeling emotionally comfortable while being cared for will increase  Description: Verbalizations of feeling emotionally comfortable while being cared for will increase  Outcome: Ongoing     Problem: Psychomotor Activity - Altered:  Goal: Absence of psychomotor disturbance signs and symptoms  Description: Absence of psychomotor disturbance signs and symptoms  Outcome: Ongoing     Problem: Sensory Perception - Impaired:  Goal: Demonstrations of improved sensory functioning will increase  Description: Demonstrations of improved sensory functioning will increase  Outcome: Ongoing  Goal: Decrease in sensory misperception frequency  Description: Decrease in sensory misperception frequency  Outcome: Ongoing  Goal: Able to refrain from responding to false sensory perceptions  Description: Able to refrain from responding to false sensory perceptions  Outcome: Ongoing  Goal: Demonstrates accurate environmental perceptions  Description: Demonstrates accurate environmental perceptions  Outcome: Ongoing  Goal: Able to distinguish between reality-based and nonreality-based thinking  Description: Able to distinguish between reality-based and nonreality-based thinking  Outcome: Ongoing  Goal: Able to interrupt nonreality-based thinking  Description: Able to interrupt nonreality-based thinking  Outcome: Ongoing     Problem: Sleep Pattern Disturbance:  Goal: Appears well-rested  Description: Appears well-rested  Outcome: Ongoing     Problem: Skin Integrity:  Goal: Will show no infection signs and symptoms  Description: Will show no infection signs and symptoms  Outcome: Ongoing  Goal: Absence of new skin breakdown  Description: Absence of new skin breakdown  Outcome: Ongoing     Problem: Falls - Risk of:  Goal: Absence of physical injury  Description: Absence of physical injury  Outcome: Ongoing  Goal: Will remain free from falls  Description: Will remain free from falls  Outcome: Ongoing     Problem: Non-Violent Restraints  Goal: Removal from restraints as soon as assessed to be safe  Outcome: Not Met This Shift

## 2022-04-20 NOTE — ADDENDUM NOTE
Addendum  created 04/19/22 2211 by Philemon Habermann, MD    Clinical Note Signed Noted.   Thank you.   Appointment notes updated.

## 2022-04-20 NOTE — OP NOTE
Operative Note      Patient: Elvin Nissen  YOB: 1955  MRN: [id number]    Date of Procedure: 4/19/2022    Pre-Op Diagnosis: Right-sided C6 and C7 facet fracture as well as destructive C6-7 disc. Unstable cervical fracture dislocation along with a right-sided C7 radiculopathy    Post-Op Diagnosis: Same     Patient's for the procedure. Patient with an unstable 3 column injury involving C6-C7 with disruption of the disc anterior osteophyte fracture along with severe facet fracture and resultant C7 radiculopathy with deficit. Procedure  Open reduction internal fixation of unstable C6-7 fracture dislocation  C6-7 and C7-T1 complete discectomy and anterior arthrodesis  C7 corpectomy  Implantation of DePuy peek corpectomy cage  Use of morselized autograft by the same incision  Use of morselized allograft  Anterior fixation with titanium plate and screws  Use of fluoroscopy  Use of operative microscope      Surgeon(s):  Taz Brown DO    Assistant:   * No surgical staff found *    Anesthesia: General    Estimated Blood Loss (mL): less than 50     Complications: Intraoperative identification of comminuted completely incompetent C7 vertebral body requiring complete corpectomy conversion from initial plan for C6-7 anterior discectomy. Specimens:   * No specimens in log *    Implants:  Implant Name Type Inv.  Item Serial No.  Lot No. LRB No. Used Action   GRAFT BNE SUB SM 1ML CRYOPRESERVED VIABLE ASTER CANC E - [id number]  GRAFT BNE SUB SM 1ML CRYOPRESERVED VIABLE ASTER CANC E [id number] [facility] T[facility]-WD N/A N/A 1 Implanted   SCREW SPNL L16MM DIA4MM ANT CERV TI SELF DRL PARADISE ANG FULL - [id number]  SCREW SPNL L16MM DIA4MM ANT CERV TI SELF DRL PARADISE ANG FULL  JN[employer] DEPU[employer] SYNTHES SPINE-WD  N/A 4 Implanted   PLATE SPNL [id number] ANT BILAT CERV TI TWO LEV KYLE HYBRID - [id number]  PLATE SPNL [id number] ANT BILAT CERV TI TWO LEV KYLE HYBRID  JNJ DEPU[employer] SYNTHES SPINE-WD  N/A 1 Implanted CAGE SPNL [id number] 6DEG STD CERV THORLUM C FBR POLYMER - [id number]  CAGE SPNL [id number] 6DEG STD CERV THORLUM C FBR POLYMER  JNJ DEPUY SYNTHES SPINE-WD  N/A 1 Implanted         Drains:   Closed/Suction Drain Anterior Neck Bulb 7 [place]raini (Active)   Site Description Healing 04/20/22 0742   Drainage Appearance Bloody 04/20/22 0742   Output (ml) 10 ml 04/20/22 0742       NG/OG/NJ/NE Tube Orogastric 14 fr Right mouth (Active)   Surrounding Skin Intact 04/20/22 0400   Securement device Yes 04/20/22 0400   Status Suction-low intermittent 04/20/22 0400   Placement Verified by X-Ray (Initial) 04/20/22 0400   NG/OG/NJ/NE External Measurement (cm) 65 cm 04/20/22 0400   Drainage Appearance Bloody 04/19/22 1143   Free Water/Flush (mL) 30 mL 04/20/22 0600       Urethral Catheter Temperature probe 16 fr (Active)   Catheter Indications Need for fluid volume management of the critically ill patient in a critical care setting 04/20/22 1200   Urine Color Yellow 04/20/22 1200   Urine Appearance Clear 04/20/22 1200   Urine Odor Malodorous 04/19/22 1237   Output (mL) 200 mL 04/20/22 1200       Findings: Severe intraoperative finding of anterior superior half of the C7 vertebral body completely fractured and comminuted requiring complete corpectomy for proper fixation    Detailed Description of Procedure: The patient been consented by his daughters prior to prior to being brought into the operative room. He was already intubated prior to surgery and was brought into the operating room and placed under general anesthesia placed supine on the regular bed with a small shoulder bump. Arms were tucked at the sides and secured shoulders were taped caudad. All pressure points were padded. Intracervical region was shaved and prepped after midline and right paramedian neck creases were marked out. Fluoroscopy is used to rut out the neck crease in line with the C6-7 to space.   After sterile prepping draping and timeout was performed I pretreated the incision with once a lidocaine with epinephrine. 10 blade was used to perform incision followed Bovie to take down the subcutaneous tissue followed by placement of a self-retaining retractor. Metzenbaum scissors were used to undermine the platysma followed by transection using Bovie cautery in line with the skin incision. Plantar was then advanced followed by blunt and sharp dissection using a combination of Metzenbaum scissors and finger dissection medial to the SCM sweeping the carotid sheath laterally. Esophagus was identified and swept medially using a Cloward retractor. Progressively Metzenbaum scissors and kidney were used to clear out the prevertebral fascia identified the AL L. Disrupted C6-7 disc space was medial identified. Then spinal needle was used to confirm the appropriate to space at C6-7 using lateral fluoroscopy. Bovie and suction was then used to clear off the AL L from mid C6-C7 undermining both longus coli and placement of a  retractor. Operative microscope was then brought into the field. The C6-7 disc space was removed using a combination of 15 blade 3 for curette and 3 points. I noted that the anterior superior portion of the C7 vertebral body was fractured and immediately became comminuted and fragmented. At this point I noted that the superior anterior half of the C7 vertebral body was significantly absent. Completion of discectomies performed as well as takedown of the posterior PLL and removal of multiple disc fragments that were behind the body of C6 using a combination of nerve hook to curette and pituitary. I then used the trial and ultimately did attempt placement of a cage at the C6-7 disc space. However after placement of the cage lateral fluoroscopy showed that the cage was actually abutting the posterior superior portion that was remaining of the C7 vertebral body.   As opposed to the cage being between the remaining endplate of C7 and the inferior endplate of C6 it was actually abutting the anterior margin of the remaining superior endplate and pushing the superior portion of the C7 vertebral body back into the spinal canal which concern me significantly. I made the decision at this point to remove the cage altogether and complete a full corpectomy at C7 to allow for proper fixation and eliminate the risk of progressively pushing the C7 vertebral body back into the canal.  At this point the exposure had to be extended more caudally using a combination of Metzenbaum scissors and finger dissection. I then used Bovie to remove the superior AL L over the T1 vertebral body. Bipolar tractor was replaced more caudal.  15 blade and 4 curette were used to remove the bulk of the disc at C7-T1 followed by 3 punch. I then used a high-speed bur to drill down the gutters bilaterally at C7 medial to the longus coli and remove portions of the C7 vertebral body in piecemeal fashion. Drill was used to take down the posterior margin of C7 and upgoing curette was used to penetrate the posterior longitudinal ligament. 2 punch was then used to remove the residual as well as undercut the endplates of C6 and T1. Traction of the head was performed anesthesia with attempted implantation of a 28 mm height cage which was lordotic. This was noted to be appropriate and then filled with morselized autograft from the C7 vertebral body as well as cellular graft. This was then placed within the cavity and slightly countersunk. Followed by this placement of a titanium fixation plate along with 18 mm screws were placed within the C6 through the vertebral bodies. Before final tightening of all the screws and O-arm spin was completed to ensure that there was no breach posteriorly of the screws as well as to ensure that the cage was in good position. I noted on the arms.   The cage was in good position with maintained lordosis and the screws were of appropriate length. Final tightening of the screws was then performed and the wound was thoroughly irrigated with multiple rounds of saline. Floseal Gelfoam was used for hemostasis along with bipolar cautery. A 7 flat SHAWN drain was tunneled subplatysmal and secured with a drain stitch. Wound was closed in 2 layers using multiple 3-0 Vicryl interrupted sutures for the platysma and a running 4-0 Monocryl subcuticular for the skin along with Dermabond.   Patient was kept in a collar kept intubated and returned to the ICU    Electronically signed by Dilcia Rausch DO on 4/20/2022 at 4:44 PM

## 2022-04-20 NOTE — PLAN OF CARE
Please page neurosurgery when Cervical xrays are completed     Electronically signed by MADI Youngblood NP on 4/20/2022 at 4:45 PM

## 2022-04-21 PROBLEM — E83.39 HYPOPHOSPHATEMIA: Status: ACTIVE | Noted: 2022-01-01
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
## 2022-04-21 PROBLEM — E87.6 HYPOKALEMIA: Status: ACTIVE | Noted: 2022-01-01

## 2022-04-21 PROBLEM — J96.00 ACUTE RESPIRATORY FAILURE (HCC): Status: ACTIVE | Noted: 2022-01-01

## 2022-04-21 PROBLEM — D64.9 ANEMIA: Status: ACTIVE | Noted: 2022-01-01

## 2022-04-21 PROBLEM — S12.9XXA CERVICAL SPINE FRACTURE (HCC): Status: ACTIVE | Noted: 2022-01-01
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
## 2022-04-21 PROBLEM — B37.81 CANDIDA ESOPHAGITIS (HCC): Status: RESOLVED | Noted: 2019-07-05 | Resolved: 2022-01-01

## 2022-04-21 NOTE — DISCHARGE INSTR - COC
Continuity of Care Form    Patient Name: Yonathan Motley   :  1955  MRN:  [id number]    Admit date:  2022  Discharge date:  ***    Code Status Order: Full Code   Advance Directives:      Admitting Physician:  Selena Aranda MD  PCP: Julisa Gaines MD    Discharging Nurse: [facility] Unit/Room#: 1009/1009-01  Discharging Unit Phone Number: [phone]    Emergency Contact:   Extended Emergency Contact Information  Primary Emergency Contact: Rupinder Henson  Address: .    [address] Phone: [phone]  Work Phone: [phone]  Relation: Child  Secondary Emergency Contact: KarsonkennaDannyDestineeher Hinojosa [address] Phone: [phone]  Relation: Child    Past Surgical History:  Past Surgical History:   Procedure Laterality Date    CERVICAL FUSION N/A 2022    C7 CORPECTOMY performed by Megan Loyola DO at [facility] FLX DX W/COLLJ SPEC WHEN PFRMD N/A 2018    COLONOSCOPY performed by Randall Ramos MD at [facility]  2018    EGD BIOPSY performed by Randall Ramos MD at [address] [place] [address]        Immunization History:   Immunization History   Administered Date(s) Administered    COVID-19, Pfizer Purple top, DILUTE for use, 12+ yrs, 30mcg/0.3mL dose 2021, 2021, 10/12/2021    Influenza Virus Vaccine 10/01/2018, 10/04/2020, 10/13/2021    Influenza, High-dose, Quadv, 65 yrs +, IM (Fluzone) 10/03/2020    Influenza, Quadv, IM, PF (6 mo and older Fluzone, Flulaval, Fluarix, and 3 yrs and older Afluria) 10/03/2018    Influenza, Quadv, Recombinant, IM PF (Flublok 18 yrs and older) 10/05/2019    Influenza, Triv, inactivated, subunit, adjuvanted, IM (Fluad 65 yrs and older) 10/09/2019    Pneumococcal Polysaccharide ([id number]) 2017    Tdap (Boostrix, Adacel) 2020    Zoster Recombinant (Shingrix) 10/03/2020       Active Problems:  Patient Active Problem List   Diagnosis Code    Insomnia G47.00    Moderate persistent asthma with acute exacerbation J45.41    Mixed hyperlipidemia E78.2    Adverse drug reaction T50.905A    Liver disease, chronic, due to alcohol ([facility]) K70.9    COPD (chronic obstructive pulmonary disease) with chronic bronchitis ([facility]) J44.9    Obstructive sleep apnea G47.33    Paroxysmal SVT (supraventricular tachycardia) ([facility]) I47.1    Mild persistent asthma without complication P70.04    Dysphagia R13.10    History of cervical fracture Z87.81    Closed fracture of cervical vertebra ([facility]) S12. 9XXA    Cervical radiculopathy M54.12    Hypokalemia E87.6    Fall W19. XXXA    Hypophosphatemia E83.39    Acute respiratory failure ([facility]) J96.00    Blood alcohol level of 240 mg/100 ml or more Y90.8    Anemia D64.9    Cervical spine fracture ([facility]) S12. 9XXA       Isolation/Infection:   Isolation            No Isolation          Patient Infection Status       None to display            Nurse Assessment:  Last Vital Signs: /84   Pulse 72   Temp 99.1 °F (37.3 °C) (Oral)   Resp 23   Ht 6' 2\" (1.88 m)   Wt 233 lb 11 oz (106 kg)   SpO2 99%   BMI 30.00 kg/m²     Last documented pain score (0-10 scale): Pain Level: 2  Last Weight:   Wt Readings from Last 1 Encounters:   04/19/22 233 lb 11 oz (106 kg)     Mental Status:  disoriented    IV Access:  - None    Nursing Mobility/ADLs:  Walking   Assisted  Transfer  Assisted  Bathing  Assisted  Dressing  Assisted  Toileting  Assisted  Feeding  Assisted  Med Admin  Assisted  Med Delivery   whole    Wound Care Documentation and Therapy:  Incision 04/19/22 Neck (Active)   Dressing Status Other (Comment) 04/21/22 0000   Dressing/Treatment Open to air 04/20/22 0800   Incision Assessment Dry 04/20/22 0800   Drainage Amount None 04/20/22 0800   Melonie-incision Assessment Intact 04/20/22 0400   Number of days: 1        Elimination:  Continence:    Bowel: Yes  Bladder: No  Urinary Catheter:  Intermittent straight cath Q6H PRN    Colostomy/Ileostomy/Ileal Conduit: No       Date of Last BM: 4/23/22    Intake/Output Summary (Last 24 hours) at 4/21/2022 1329  Last data filed at 4/21/2022 1100  Gross per 24 hour   Intake 3558.89 ml   Output 970 ml   Net 2588.89 ml     I/O last 3 completed shifts: In: 0989 [I.V.:6317.1; NG/GT:313; IV Piggyback:594.9]  Out: 4928 [UYQBR:1095; Drains:20; Blood:150]    Safety Concerns:     Sundowners Sundrome, History of Falls (last 30 days), and At Risk for Falls    Impairments/Disabilities:      Vision    Nutrition Therapy:  Current Nutrition Therapy:   - Oral Diet:  General and Dental Soft    Routes of Feeding: Oral  Liquids: Thin Liquids  Daily Fluid Restriction: no  Last Modified Barium Swallow with Video (Video Swallowing Test): not done    Treatments at the Time of Hospital Discharge:   Respiratory Treatments: ***  Oxygen Therapy:  is not on home oxygen therapy.   Ventilator:    - No ventilator support    Rehab Therapies: {THERAPEUTIC INTERVENTION:[id number]}  Weight Bearing Status/Restrictions: No weight bearing restrictions  Other Medical Equipment (for information only, NOT a DME order):  {EQUIPMENT:[id number]}  Other Treatments: ***    Patient's personal belongings (please select all that are sent with patient):  Dentures upper    RN SIGNATURE:  Electronically signed by Roxanna Roberson RN on 4/26/22 at 3:39 PM EDT    CASE MANAGEMENT/SOCIAL WORK SECTION    Inpatient Status Date: ***    Readmission Risk Assessment Score:  Readmission Risk              Risk of Unplanned Readmission:  16           Discharging to Facility/ Agency   Name:   Address:  Phone:  Fax:    Dialysis Facility (if applicable)   Name:  Address:  Dialysis Schedule:  Phone:  Fax:    / signature: {Esignature:[id number]}    PHYSICIAN SECTION    Prognosis: Good    Condition at Discharge: Stable    Rehab Potential (if transferring to Rehab): Good    Recommended Labs or Other Treatments After Discharge: ***    Physician Certification: I certify the above information and transfer of Dyan Likes  is necessary for the continuing treatment of the diagnosis listed and that he requires Acute Rehab for less 30 days.      Update Admission H&P: No change in H&P    PHYSICIAN SIGNATURE:  Electronically signed by MADI Monzon CNP on 4/21/22 at 1:29 PM EDT

## 2022-04-21 NOTE — PROGRESS NOTES
Physician Progress Note      Tila Delgado  [facility] #:                  [id number]  :                       1955  ADMIT DATE:       2022 7:31 AM  DISCH DATE:  RESPONDING  PROVIDER #:        Ayden Menon          QUERY TEXT:    Pt admitted with C6/7 Fx from fall down stairs. Pt noted to have CK 1332, and   ETOH 241 (documented rhabdomyolysis or clinical indicators of rhabdomyolysis   i.e. hematuria, elevated CK, myoglobin). If possible, please document in   progress notes and discharge summary if you are evaluating and/or treating any   of the following: The medical record reflects the following:  Risk Factors: Fall down stairs, C6/7 FX, Alc intoxication, Ac Resp   Failure/Intubation, Alc Ketoacidosis  Clinical Indicators: CK 1332, ETOH 241, NS Consult Very intoxicated s/p fall. Numbness and paresthesias to both hands. R tric 4- and WE 4+ with intrinsics   4/5. ORIF to decompress today. PN  Trauma Will plan for intubation,   patient agitated with concern for airway protection giving cervical collar and   injury, inability to lay flat, inability to clear his secretions  Treatment: ORIF Cervical, labs, SICU, labs, Consult Neurosurgery  Per [email or web address]  Traumatic rhabdomyolysis cause examples: crush syndrome, prolonged   immobilization  Nontraumatic rhabdomyolysis cause examples:  marked exertion, hyperthermia,   metabolic myopathy, drugs or toxins, infections, electrolyte disorders. Options provided:  -- Traumatic rhabdomyolysis  -- Nontraumatic rhabdomyolysis  -- Other - I will add my own diagnosis  -- Disagree - Not applicable / Not valid  -- Disagree - Clinically unable to determine / Unknown  -- Refer to Clinical Documentation Reviewer    PROVIDER RESPONSE TEXT:    This patient has nontraumatic rhabdomyolysis. Query created by:  Mark Tineo on 2022 7:13 AM      Electronically signed by:  Ayden Menon 4/21/2022 5:17 PM

## 2022-04-21 NOTE — PROGRESS NOTES
ICU PROGRESS NOTE    PATIENT NAME: Daniel Vega  MEDICAL RECORD NO. [id number]  DATE: 4/21/2022    PRIMARY CARE PHYSICIAN: Azeem Dunbar MD    HD: # 2  PMH Alcoholism  Fall from Stand / Unstable C6-7 Fx  POD#1 ORIF C6-7 by neurosurgery    ASSESSMENT    Patient Active Problem List   Diagnosis    Clostridium difficile diarrhea    Chronic back pain    Insomnia    Moderate persistent asthma with acute exacerbation    Mixed hyperlipidemia    Acute bronchitis    Adverse drug reaction    Liver disease, chronic, due to alcohol (HCC)    COPD (chronic obstructive pulmonary disease) with chronic bronchitis (HCC)    Obstructive sleep apnea    Paroxysmal SVT (supraventricular tachycardia) (HCC)    Mild persistent asthma without complication    Dysphagia    Candida esophagitis (HCC)    History of cervical fracture    Closed fracture of cervical vertebra (HCC)    Cervical radiculopathy     MEDICAL DECISION MAKING AND PLAN    Neuro:  - GCS 10T, nodding to questions appropriately, following commands  - Pain/sedation: Propofol (off overnight)  , Fentanyl, Precedex  gtt, valium 10mg q6, Tylenol 1g q8  - Daily ETOH use  - Thiamine and folic acid   - CTH neg for acute injuries  - CT C spine showing C6-7 fx, MRI C spine showing epidural hemorrhage at C6-7 amd prevertebral soft tissue swelling    - MRA neck showing no significant stenosis   - Intubated due to stridor, DL showing some airway edema   - POD#1 ORIF C6-7 by neurosurgery   - SBP <140 per nsg    CV  - HR   - MAP 89--102, no pressor requirements  - Hydralazine 10mg q6 prn for SBP goal <140 per nsg  - Troponin 21, EKG stable  - No significant cardiac hx on chart, to clarify with family    Pulm  - Intubated Bilevel Fio2 60 (PRVC changed overnight, PEEP went up)  - ABG 7.42/39/109/26/2  -   - CXR atelectasis right base.   ETT in correct position  - Possible SBT if mentation allows  - No history of smoking or Ambulatory O2    GI/Nutrition  - Diet tube feeds 25 cc h  - Reglan started   - Ppx: protonix, glycolax  - CT AP showing no acute traumatic injuries, diverticulosis with jejunal intussusception, small fat containing umbilical hernia    Renal/lytes  - BUN/Cr 12/0.47  - Na/K 134/3.4  - Mg/P 1.8/2.3,replaced  - 120 ml/hr LR maintenance, IBW 80kg  - I/Os 3.7L/810cc  +2.9 L 24 hrs  in last 24h, 0.3 cc/kg/hr  - TA +5.9    Heme  - Hb  9.5  (11.8 )  - Plt  132 (208)  - DVT ppx per nsg recs post op    7. Endocrine        - -124        - Continue to monitor, no known hx DM    Micro  - Tmax 37.7  - WBC  4.4  (7.9)  - Monitor off antimicrobials    Family/dispo  - SBT  - Remain in ICU, monitor for withdrawals    Lines  - ETT  - PIV  - A line  - Smyth    CHECKLIST    CAM-ICU RASS: 0  RESTRAINTS: b/l soft wrist  IVF: 120 ml/hr LR  NUTRITION: Feed Tubes  ANTIBIOTICS: n/a  GI: protonix  DVT: holding for nsg  GLYCEMIC CONTROL: per shift  HOB >45: n/a, C spine   MOBILITY: bedrest  SBT: yes  IS: n/a    SUBJECTIVE    Blaine Henson Bedside assessment, Remains ventilated, no pressor support, sedation On, patient follows commands, answers yes/no with head. Moves four extremities.   Overnight requires change in ventilatory mode, started on tube feeds       OBJECTIVE  VITALS: Temp: Temp: 98.2 °F (36.8 °C)Temp  Av.2 °F (37.3 °C)  Min: 97.9 °F (36.6 °C)  Max: 99.9 °F (35.2 °C) BP Systolic (83WJJ), CSB:291 , Min:108 , PBN:350   Diastolic (20LVW), ZCO:84, Min:74, Max:84   Pulse Pulse  Av.7  Min: 71  Max: 121 Resp Resp  Av.5  Min: 16  Max: 22 Pulse ox SpO2  Av.1 %  Min: 96 %  Max: 100 %    CONSTITUTIONAL: Intubated, sedated, GCS 9T, following commands  HEENT: PERRLA, C collar in place  LUNGS: clear bilaterally in supine position  CV: HRRR  GI: Abdomen soft  MUSCULOSKELETAL: intact  NEUROLOGIC: GCS 9T, following commands, nodding to questions, moving all 4 extremities spontaneously  SKIN: intact    LAB:  CBC:   Recent Labs     22  0755 22  0524 04/21/22  0253   WBC 7.9 5.7 4.4   HGB 11.8* 10.1* 9.5*   HCT 36.2* 32.1* 29.4*   MCV 93.5 95.8 94.5    159 132*     BMP:   Recent Labs     04/19/22  0755 04/20/22  0524 04/21/22  0253    142 134*   K 4.2 4.1 3.4*    106 100   CO2 14* 22 25   BUN 6* 11 12   CREATININE 0.49* 0.58* 0.47*   GLUCOSE 134* 146* 140*     RADIOLOGY:  CXR: 04/21/22   Impression   1. The endotracheal tube tip is located 9.7 cm above the lisa.  This could   be advanced approximately 3-4 cm for more optimal placement. 2. Worsening atelectasis in the right lung base. 3. The orogastric tube tip now terminates below the diaphragm, incompletely   imaged. RT advanced ETT 4cm post CXR      Urmila Schulz MD  4/20/22, 7:31 AM           Trauma Attending Ignacia Heaton      I have reviewed the above GCS note(s) and confirmed the key elements of the medical history and physical exam. I have seen and examined the pt. I have discussed the findings, established the care plan and recommendations with Resident, GCS RN, bedside nurse.   POD#1 s/p C6-7 fusion   actue respiratory failure - +cuff leak-   Withdrawal   Start roxicodone   TF   Critical Care min: 5680 Dominique Elena DO  4/21/2022  6:32 PM

## 2022-04-21 NOTE — CARE COORDINATION
Consult received as dtr wishes for pt to have OP tx resources for his alcohol use. SW has been following to complete SBIRT once pt able to participate.   Will discuss his drinking and tx options once pt extubated/alert/oriented

## 2022-04-21 NOTE — PROGRESS NOTES
Physical Therapy  Facility/Department: [facility]  Physical Therapy Initial Assessment    Name: Dyan Garces  : 1955  MRN: [id number]  Date of Service: 2022  Chief Complaint   Patient presents with   [facility] Fall     transfer from [facility]      Discharge Recommendations:  Patient would benefit from continued therapy after discharge          Patient Diagnosis(es): The primary encounter diagnosis was Closed nondisplaced fracture of sixth cervical vertebra, unspecified fracture morphology, initial encounter ([facility] [address] Diagnoses of Closed nondisplaced fracture of seventh cervical vertebra, unspecified fracture morphology, initial encounter ([facility] [address] and Alcoholic ketoacidosis were also pertinent to this visit. Past Medical History:  has a past medical history of Asthma, Calculus of gallbladder without mention of cholecystitis or obstruction, Chronic back pain, Hyperlipidemia, Insomnia, unspecified, Spinal stenosis, unspecified region other than cervical, and Urinary incontinence. Past Surgical History:  has a past surgical history that includes Tonsillectomy; Colonoscopy; pr colonoscopy flx dx w/collj spec when pfrmd (N/A, 2018); Upper gastrointestinal endoscopy (2018); and cervical fusion (N/A, 2022). Assessment   Body Structures, Functions, Activity Limitations Requiring Skilled Therapeutic Intervention: Decreased functional mobility ; Decreased ROM; Decreased strength;Decreased cognition  Assessment: Patient received passive ROM to BLEs then active and active assisted ROM to UEs since he became alert and was able to follow some commands. Per nurse, patient needs his restraints due to decreased safety and judgment. Restraints removed with careful control of hands by writer and then was rerestrained. Patient attempting to write on a dry erase board to his daughter who was present. Will plan to continue with ROM until he is extubated.   Therapy Prognosis: Good  Decision Making: Medium Complexity  Clinical Presentation: evolving        Plan   Plan  Plan: 5-7 times per week  Current Treatment Recommendations: ROM,Strengthening,Functional mobility training,Patient/Caregiver education & training  Safety Devices  Type of Devices: All fall risk precautions in place,Nurse notified,Left in bed  Restraints  Restraints Initially in Place: Yes  Restraints: bilateral wrist restraints and 4 rails up     Restrictions  Restrictions/Precautions  Restrictions/Precautions: Surgical Protocols  Required Braces or Orthoses?: Yes  Required Braces or Orthoses  Cervical: c-collar  Position Activity Restriction  Other position/activity restrictions: [place] down a flight of steps. Cervical fracture. ORIF C6-7 done 4/19/22. Cervical collar on at all times. Subjective   General  Chart Reviewed: Yes  Patient assessed for rehabilitation services?: Yes  Family / Caregiver Present: Yes (Daughter)  Other (Comment): He followed several simple commands. Social/Functional History  Social/Functional History  Lives With: Alone  Type of Home: House  Home Layout: Two level,Bed/Bath upstairs  Home Access: Stairs to enter with rails  Entrance Stairs - Number of Steps: 5  ADL Assistance: Independent  Homemaking Assistance: Independent  Ambulation Assistance: Independent  Transfer Assistance: Independent         Cognition   Orientation  Orientation Level: Unable to assess  Cognition  Cognition Comment: Able to follow simple commands for exercise     Objective      Observation/Palpation  Observation: Has NG tube for nutrition, orally vented. In cervical collar. Cervical ORIF incision not observed. In bilateral wrist restraints. Strength RLE  Strength RLE: Exception  R Hip Flexion: 3-/5  R Knee Flexion: 3/5  Strength LLE  Strength LLE: Exception  Comment: These movements observed after he became alert.   L Hip Flexion: 2+/5  L Knee Flexion: 3/5           Bed mobility  Rolling to Right: Moderate assistance  Comment: Patient assists with rolling himself with reinsertion of pillow behind back for pressure relief. PROM Exercises: Received PROM BLEs. A/AROM Exercises: Completed 10 reps wrist flex/ext, elbow flex/ext, shoulder IR/ER, shoulder flexion to 90 degrees with elbows extended      AM-PAC Score  AM-PAC Inpatient Mobility Raw Score : 7 (04/21/22 1636)  AM-PAC Inpatient T-Scale Score : 26.42 (04/21/22 1636)  Mobility Inpatient CMS 0-100% Score: 92.36 (04/21/22 1636)  Mobility Inpatient CMS G-Code Modifier : CM (04/21/22 1636)          Goals  Short Term Goals  Time Frame for Short term goals: 14 visits  Short term goal 1: Patient will receive PROM and have contractures prevented. Short term goal 2: Facilitate active ROM  Short term goal 3: Assess mobility after extubation.        Therapy Time   Individual Concurrent Group Co-treatment   Time In 4235         Time Out 1615         Minutes 30         Timed Code Treatment Minutes: 21 Minutes       Ely Flores PT

## 2022-04-21 NOTE — PLAN OF CARE
Problem: Non-Violent Restraints  Goal: Removal from restraints as soon as assessed to be safe  Outcome: Progressing  Goal: No harm/injury to patient while restraints in use  Outcome: Progressing  Goal: Patient's dignity will be maintained  Outcome: Progressing     Problem: Confusion - Acute:  Goal: Absence of continued neurological deterioration signs and symptoms  Description: Absence of continued neurological deterioration signs and symptoms  Outcome: Progressing  Goal: Mental status will be restored to baseline  Description: Mental status will be restored to baseline  Outcome: Progressing     Problem: Discharge Planning:  Goal: Ability to perform activities of daily living will improve  Description: Ability to perform activities of daily living will improve  Outcome: Progressing  Goal: Participates in care planning  Description: Participates in care planning  Outcome: Progressing     Problem: Injury - Risk of, Physical Injury:  Goal: Absence of physical injury  Description: Absence of physical injury  4/21/2022 0842 by Sherlyn Holter, RN  Outcome: Progressing  4/21/2022 0318 by Charmayne Pea, RN  Outcome: Progressing  Goal: Will remain free from falls  Description: Will remain free from falls  4/21/2022 0842 by Sherlyn Holter, RN  Outcome: Progressing  4/21/2022 0318 by Charmayne Pea, RN  Outcome: Progressing     Problem: Mood - Altered:  Goal: Mood stable  Description: Mood stable  Outcome: Progressing  Goal: Absence of abusive behavior  Description: Absence of abusive behavior  Outcome: Progressing  Goal: Verbalizations of feeling emotionally comfortable while being cared for will increase  Description: Verbalizations of feeling emotionally comfortable while being cared for will increase  Outcome: Progressing     Problem: Psychomotor Activity - Altered:  Goal: Absence of psychomotor disturbance signs and symptoms  Description: Absence of psychomotor disturbance signs and symptoms  Outcome: Progressing Problem: Sensory Perception - Impaired:  Goal: Demonstrations of improved sensory functioning will increase  Description: Demonstrations of improved sensory functioning will increase  4/21/2022 0842 by Niya Guy RN  Outcome: Progressing  4/21/2022 0318 by Stephanie Dunne RN  Outcome: Progressing  Goal: Decrease in sensory misperception frequency  Description: Decrease in sensory misperception frequency  4/21/2022 0842 by Niya Guy RN  Outcome: Progressing  4/21/2022 0318 by Stephanie Dunne RN  Outcome: Progressing  Goal: Able to refrain from responding to false sensory perceptions  Description: Able to refrain from responding to false sensory perceptions  4/21/2022 0842 by Niya Guy RN  Outcome: Progressing  4/21/2022 0318 by Stephanie Dunne RN  Outcome: Progressing  Goal: Demonstrates accurate environmental perceptions  Description: Demonstrates accurate environmental perceptions  4/21/2022 0842 by Niya Guy RN  Outcome: Progressing  4/21/2022 0318 by Stephanie Dunne RN  Outcome: Progressing  Goal: Able to distinguish between reality-based and nonreality-based thinking  Description: Able to distinguish between reality-based and nonreality-based thinking  4/21/2022 0842 by Niya Guy RN  Outcome: Progressing  4/21/2022 0318 by Stephanie Dunne RN  Outcome: Progressing  Goal: Able to interrupt nonreality-based thinking  Description: Able to interrupt nonreality-based thinking  Outcome: Progressing

## 2022-04-21 NOTE — CARE COORDINATION
Case Management Initial Discharge Plan  Sachin Henson,         Pt on ventilator and sedated, called pt's daughter Sofy Nicole listed on Emergency Contact. Met with:family member patient and daughter to discuss discharge plans. Information verified: address, contacts, phone number, , insurance Yes  Insurance Provider: [facility] Medicare vs. Medicare with [employer] pt's daughter is unsure    Emergency Contact/Next of Kin name & number: Nelly Jovel - daughter ([phone]; Selena Ponce - daughter ([phone])  Who are involved in patient's support system? Daughters and Brother    PCP: Pricila Pruitt MD  Date of last visit: 2021 per EMR      Discharge Planning    Living Arrangements:    Micheal Maher has 2 stories  4-5 stairs to climb to get into front door, 1 flight stairs to climb to reach second floor  Location of bedroom/bathroom in home 2nd floor    Patient able to perform ADL's:Independent    Current Services (outpatient & in home) DME  DME equipment: Cpap  DME provider: Unknown    Is patient receiving oral anticoagulation therapy? No    If indicated:   Physician managing anticoagulation treatment: N/A  Where does patient obtain lab work for ATC treatment? n/A    Does patient have any issues/concerns obtaining medications? No  If yes, what are patient's concerns? Is there a preferred Pharmacy after hours or on weekends? Yes    If yes, which pharmacy?  [facility] [address] per pt's daughter unsure of the street    Potential Assistance Needed:   SW interest per pt's daughter for detox OP    Patient agreeable to home care: No or TBD  Freedom of choice provided:  yes    Prior SNF/Rehab Placement and Facility: None  Agreeable to SNF/Rehab: TBD  [place] of choice provided: yes     Evaluation: yes    Expected Discharge date:   22    Patient expects to be discharged to:   TBD, monitor needs and PT/OT evals to assist with disposition planning    If home: is the family and/or caregiver wiling & able to provide support at home? Lives alone, pt's daughters live about 10-15 min away  Who will be providing this support? Pt's family able to assist with some support, TBD*    Follow Up Appointment: Best Day/ Time:      Transportation provider: Self  Transportation arrangements needed for discharge: Monitor needs Pt's daughters or his brother can assist with transportation if appropriate    Readmission Risk              Risk of Unplanned Readmission:  16             Does patient have a readmission risk score greater than 14?: Yes  If yes, follow-up appointment must be made within 7 days of discharge. Goals of Care: Improved respiratory status and encourage ETOH detox long term      Educated pt's daughter Jay Tovar on transitional options, provided freedom of choice and are agreeable with plan      Discharge Plan: Pt remains on vent, discussed review of care needs with pt's daughter Jay Tovar. She inquires on ETOH resources after hospital stay. Informed pt has to be agreeable to plan and transition disposition. Discussed options for ARU vs. SNF monitor needs post PT/OT evaluations. CM will provide lists for both physical rehab options at bedside for family review. Also discussed home with homecare if appropriate. She is unsure what pt will be agreeable to. Monitor progress of care. SW order placed to see when pt appropriate for OP ETOH treatment options and decision making. 1140 To pt's room, pt's daughter Juan Luis Wells is now at the bedside provided lists for SNF and ARU discussed differences and offered freedom of choice. Pt remains sedate on vent.       Electronically signed by Lowell Felder RN on 4/21/22 at 11:22 AM EDT

## 2022-04-21 NOTE — PROGRESS NOTES
[facility])  Occupational Therapy Not Seen Note    DATE: 2022    NAME: Eliezer Bergeron  MRN: [id number]   : 1955      Patient not seen this date for Occupational Therapy due to:    Patient is not appropriate for active participation in OT evaluation/treatment at this time d/t intubated and sedated     Next Scheduled Treatment: check back 2022    Electronically signed by ANA Orlando/L on 2022 at 10:43 AM

## 2022-04-22 NOTE — PROGRESS NOTES
[facility])  Occupational Therapy Not Seen Note    DATE: 2022    NAME: Nell Bustos  MRN: [id number]   : 1955      Patient not seen this date for Occupational Therapy due to:    Patient is not appropriate for active participation in OT evaluation/treatment at this time d/t intubated, not appropriate for OOB activity     Next Scheduled Treatment: check back 2022    Electronically signed by RY Lozano on 2022 at 10:02 AM

## 2022-04-22 NOTE — PLAN OF CARE
Problem: Non-Violent Restraints  Goal: Removal from restraints as soon as assessed to be safe  4/22/2022 0943 by Alfredo Pfeiffer RN  Outcome: Progressing  4/22/2022 0622 by Florecita Odell RN  Outcome: Progressing  Goal: No harm/injury to patient while restraints in use  4/22/2022 0943 by Alfredo Pfeiffer RN  Outcome: Progressing  4/22/2022 0622 by Florecita Odell RN  Outcome: Progressing  Goal: Patient's dignity will be maintained  4/22/2022 0943 by Alfredo Pfeiffer RN  Outcome: Progressing  4/22/2022 0622 by Florecita Odell RN  Outcome: Progressing     Problem: Confusion - Acute:  Goal: Absence of continued neurological deterioration signs and symptoms  Description: Absence of continued neurological deterioration signs and symptoms  4/22/2022 0943 by Alfredo Pfeiffer RN  Outcome: Progressing  4/22/2022 0622 by Florecita Odell RN  Outcome: Progressing  Goal: Mental status will be restored to baseline  Description: Mental status will be restored to baseline  4/22/2022 0943 by Alfredo Pfeiffer RN  Outcome: Progressing  4/22/2022 0622 by Florecita Odell RN  Outcome: Progressing     Problem: Discharge Planning:  Goal: Ability to perform activities of daily living will improve  Description: Ability to perform activities of daily living will improve  4/22/2022 0943 by Alfredo Pfeiffer RN  Outcome: Progressing  4/22/2022 0622 by Florecita Odell RN  Outcome: Progressing  Goal: Participates in care planning  Description: Participates in care planning  4/22/2022 0943 by Alfredo Pfeiffer RN  Outcome: Progressing  4/22/2022 0622 by Florceita Odell RN  Outcome: Progressing     Problem: Injury - Risk of, Physical Injury:  Goal: Absence of physical injury  Description: Absence of physical injury  4/22/2022 0943 by Alfredo Pfeiffer RN  Outcome: Progressing  4/22/2022 0622 by Florecita Odell RN  Outcome: Progressing  Goal: Will remain free from falls  Description: Will remain free from falls  4/22/2022 0943 by Alfredo Pfeiffer RN  Outcome: Progressing  4/22/2022 0622 by Ramesh Coley RN  Outcome: Progressing     Problem: Mood - Altered:  Goal: Mood stable  Description: Mood stable  4/22/2022 0943 by Pati Knapp RN  Outcome: Progressing  4/22/2022 0622 by Ramesh Coley RN  Outcome: Progressing  Goal: Absence of abusive behavior  Description: Absence of abusive behavior  4/22/2022 0943 by Pati Knapp RN  Outcome: Progressing  4/22/2022 0622 by Ramesh Colye RN  Outcome: Progressing  Goal: Verbalizations of feeling emotionally comfortable while being cared for will increase  Description: Verbalizations of feeling emotionally comfortable while being cared for will increase  4/22/2022 0943 by Pati Knapp RN  Outcome: Progressing  4/22/2022 0622 by Ramesh Coley RN  Outcome: Progressing     Problem: Psychomotor Activity - Altered:  Goal: Absence of psychomotor disturbance signs and symptoms  Description: Absence of psychomotor disturbance signs and symptoms  4/22/2022 0943 by Pati Knapp RN  Outcome: Progressing  4/22/2022 0622 by Ramesh Coley RN  Outcome: Progressing     Problem: Sensory Perception - Impaired:  Goal: Demonstrations of improved sensory functioning will increase  Description: Demonstrations of improved sensory functioning will increase  4/22/2022 0943 by Pati Knapp RN  Outcome: Progressing  4/22/2022 0622 by Ramesh Coley RN  Outcome: Progressing  Goal: Decrease in sensory misperception frequency  Description: Decrease in sensory misperception frequency  Outcome: Progressing  Goal: Able to refrain from responding to false sensory perceptions  Description: Able to refrain from responding to false sensory perceptions  Outcome: Progressing  Goal: Demonstrates accurate environmental perceptions  Description: Demonstrates accurate environmental perceptions  Outcome: Progressing  Goal: Able to distinguish between reality-based and nonreality-based thinking  Description: Able to distinguish between reality-based and nonreality-based thinking  Outcome: Progressing  Goal: Able to interrupt nonreality-based thinking  Description: Able to interrupt nonreality-based thinking  Outcome: Progressing     Problem: Sleep Pattern Disturbance:  Goal: Appears well-rested  Description: Appears well-rested  Outcome: Progressing     Problem: Skin Integrity:  Goal: Will show no infection signs and symptoms  Description: Will show no infection signs and symptoms  Outcome: Progressing  Goal: Absence of new skin breakdown  Description: Absence of new skin breakdown  Outcome: Progressing     Problem: Falls - Risk of:  Goal: Absence of physical injury  Description: Absence of physical injury  4/22/2022 0943 by Fernando Mcgarry RN  Outcome: Progressing  4/22/2022 0622 by Annalee Alfredo RN  Outcome: Progressing  Goal: Will remain free from falls  Description: Will remain free from falls  4/22/2022 0943 by Fernando Mcgarry RN  Outcome: Progressing  4/22/2022 0622 by Annalee Alfredo RN  Outcome: Progressing     Problem: Discharge Planning  Goal: Discharge to home or other facility with appropriate resources  Outcome: Progressing     Problem: Safety - Medical Restraint  Goal: Remains free of injury from restraints (Restraint for Interference with Medical Device)  Description: INTERVENTIONS:  1. Determine that other, less restrictive measures have been tried or would not be effective before applying the restraint  2. Evaluate the patient's condition at the time of restraint application  3. Inform patient/family regarding the reason for restraint  4.  Q2H: Monitor safety, psychosocial status, comfort, nutrition and hydration  Outcome: Progressing     Problem: Pain  Goal: Verbalizes/displays adequate comfort level or baseline comfort level  Outcome: Progressing     Problem: Nutrition Deficit:  Goal: Optimize nutritional status  Outcome: Progressing     Problem: ABCDS Injury Assessment  Goal: Absence of physical injury  Outcome: Progressing

## 2022-04-22 NOTE — PROGRESS NOTES
Comprehensive Nutrition Assessment    Type and Reason for Visit:  Reassess,Consult (TF recommendations only - provider to manage)    Nutrition Recommendations/Plan:   Continue TF as tolerated; suggest goal rate of 65 mL/hr to provide 2340 kcal and 146 g pro/day. Will monitor TF tolerance/adequacy. Malnutrition Assessment:  Malnutrition Status: At risk for malnutrition(04/22/22 1047)    Context:  Acute Illness     Findings of the 6 clinical characteristics of malnutrition:  Energy Intake:  Mild decrease in energy intake  Weight Loss:  Unable to assess     Body Fat Loss:  No significant body fat loss     Muscle Mass Loss:  No significant muscle mass loss    Fluid Accumulation:  No significant fluid accumulation     Strength:  Not Performed    Nutrition Assessment:    Pt remains on vent. Immune Enhancing TF started yesterday - currently at 25 mL/hr and tolerating well pe RN. RD consulted for TF goal rate. Meds/labs reviewed. Nutrition Related Findings:      Wound Type: Surgical Incision (neck)       Current Nutrition Intake & Therapies:    Average Meal Intake: NPO  Average Supplements Intake: NPO  Diet NPO  ADULT TUBE FEEDING; Nasogastric; Immune Enhancing; Continuous; 25; No; 30; Before and after each bolus  Current Tube Feeding (TF) Orders:  · Feeding Route: Nasogastric  · Formula: Immune Enhancing  · Schedule: Continuous  · Feeding Regimen: 25 mL/hr  · Current TF & Flush Orders Provides: 25 mL/hr =900 kcal and 56 g pro/day  · Goal TF & Flush Orders Provides: 65 mL/hr will provide 2340 kcal and 146 g pro/day    Additional Calorie Sources:  none      Anthropometric Measures:  Height: 6' 2\" (188 cm)  Ideal Body Weight (IBW): 190 lbs (86 kg)       Current Body Weight: 233 lb 11 oz (106 kg), 123 % IBW. Weight Source: Bed Scale  Current BMI (kg/m2): 30                          BMI Categories: Obese Class 1 (BMI 30.0-34. 9)    Estimated Daily Nutrient Needs:  Energy Requirements Based On: Kcal/kg  Weight Used for Energy Requirements: Current  Energy (kcal/day): 2300 kcal/day  Weight Used for Protein Requirements: Ideal  Protein (g/day): 130 g pro/day  Method Used for Fluid Requirements: Other (Comment)  Fluid (ml/day): per MD    Nutrition Diagnosis:   · Inadequate oral intake related to impaired respiratory function,acute injury/trauma as evidenced by NPO or clear liquid status due to medical condition,nutrition support - enteral nutrition      Nutrition Interventions:   Food and/or Nutrient Delivery: Continue TF as tolerated; suggest goal rate of 65 mL/hr to provide 2340 kcal and 146 g pro/day. Nutrition Education/Counseling: No recommendation at this time  Coordination of Nutrition Care: Continue to monitor while inpatient       Goals:  Previous Goal Met: Progressing toward Goal(s)  Goals: Meet at least 75% of estimated needs,within 7 days       Nutrition Monitoring and Evaluation:   Behavioral-Environmental Outcomes: None Identified  Food/Nutrient Intake Outcomes: Enteral Nutrition Intake/Tolerance  Physical Signs/Symptoms Outcomes: Biochemical Data,Nutrition Focused Physical Findings,Skin,Weight,Fluid Status or Edema    Discharge Planning:     Too soon to determine     [address], LD  Contact: [phone]

## 2022-04-22 NOTE — PROGRESS NOTES
Physical Therapy        Physical Therapy Cancel Note      DATE: 2022    NAME: Carmelita Gomes  MRN: [id number]   : 1955      Patient not seen this date for Physical Therapy due to:    Pt not appropriate at this time for PT treatment, agitated per RN.  Therapy will resume 22  Electronically signed by Maritza Woodard PTA on 2022 at 11:21 AM

## 2022-04-22 NOTE — PROGRESS NOTES
ICU PROGRESS NOTE    PATIENT NAME: Javier Cerda  MEDICAL RECORD NO. [id number]  DATE: 4/22/2022    PRIMARY CARE PHYSICIAN: Ching Loredo MD    HD: # 3    ASSESSMENT    Patient Active Problem List   Diagnosis    Insomnia    Moderate persistent asthma with acute exacerbation    Mixed hyperlipidemia    Adverse drug reaction    Liver disease, chronic, due to alcohol ([facility]    COPD (chronic obstructive pulmonary disease) with chronic bronchitis (HCC)    Obstructive sleep apnea    Paroxysmal SVT (supraventricular tachycardia) (HCC)    Mild persistent asthma without complication    Dysphagia    History of cervical fracture    Closed fracture of cervical vertebra (HCC)    Cervical radiculopathy    Hypokalemia    Fall    Hypophosphatemia    Acute respiratory failure ([facility]    Blood alcohol level of 240 mg/100 ml or more    Anemia    Cervical spine fracture [facility])       MEDICAL DECISION MAKING AND PLAN    Neuro:  - GCS 10T, nodding to questions appropriately, following commands  - Pain/sedation: Precedex, valium 10mg q6, markie 5mg q6 prn, fentanyl stopped this am  - Daily ETOH use  - Thiamine and folic acid   - CTH neg for acute injuries  - CT C spine showing C6-7 fx, MRI C spine showing epidural hemorrhage at C6-7 amd prevertebral soft tissue swelling         - MRA neck showing no significant stenosis        - Intubated due to stridor, DL showing some airway edema        - POD#3 ORIF C6-7 by neurosurgery        - SBP <140 per nsg     CV  - HR 64-70  - MAP , no pressor requirements  - LA 0.87 (0.85)  - Hydralazine 10mg q6 prn for SBP goal <140 per nsg  - Troponin 21, EKG stable  - No significant cardiac hx on chart, to clarify with family     Pulm  - Intubated APRV 40% Phigh 20  - ABG 7.43/41.0/108/27  -   - CXR showing high riding ETT  - Possible SBT if mentation allows  - No hx smoking or home O2 requirements     GI/Nutrition  - Diet tube feeds 25 ml/hr  - Reglan 10mg q6 for 72 hrs  - Ppx: protonix, glycolax  - CT AP showing no acute traumatic injuries, diverticulosis with jejunal intussusception, small fat containing umbilical hernia     Renal/lytes  - BUN/Cr 9/0.29  - Na/K 141/3.2, K replaced  - Mg/P 2.1/1.9, phos replaced  - 50 ml/hr LR maintenance  - IBW 80kg, total fluid cap 130 ml/hr  - I/Os 2323/1120 in last 24h, 0.4 cc/kg/hr  - 7L pos     Heme  - Hb  9.8 (9.5)  - Plt  140 (132)  - DVT ppx lovenox     7.   Endocrine        - -157        - Continue to monitor, no known hx DM     Micro  - Tmax 37.7  - WBC  5.6 (4.4)  - Monitor off antimicrobials     Family/dispo  - SBT  - Remain in ICU, monitor for withdrawals     Lines  - ETT  - PIV  - A line  - S[place]th    CHECKLIST    CAM-ICU RASS: 0  RESTRAINTS: soft b/l wirst  IVF: LR, total fluid cap 130 cc/hr  NUTRITION: tube feeds  ANTIBIOTICS: n/a  GI: protonix  DVT: lovenox  GLYCEMIC CONTROL: n/a  HOB >45: n/a  MOBILITY: bedrest  SBT: will attempt  IS: n/a    SUBJECTIVE    Blaine Ruffkavitha remains sedated and intubated. No acute overnight events. Plan to wean sedation if tolerated and for SBT today.     OBJECTIVE  VITALS: Temp: Temp: 98.5 °F (36.9 °C)Temp  Av.1 °F (37.3 °C)  Min: 98.5 °F (36.9 °C)  Max: 99.9 °F (73.5 °C) BP Systolic (07NOP), HQK:676 , Min:118 , DURAN:164   Diastolic (34MMG), CEX:83, Min:80, Max:95   Pulse Pulse  Av.1  Min: 64  Max: 109 Resp Resp  Av.6  Min: 20  Max: 37 Pulse ox SpO2  Av.7 %  Min: 98 %  Max: 100 %    CONSTITUTIONAL: Sedated, intubated, GCS 9T, following commands  HEENT: PERRLA  LUNGS: clear bilaterally  CV: HRRR  GI: abdomen soft  MUSCULOSKELETAL: intact  NEUROLOGIC: GCS9T, following commands, moving all 4 extremities  SKIN: intact    LAB:  CBC:   Recent Labs     22  0524 22  0253 22  0448   WBC 5.7 4.4 5.6   HGB 10.1* 9.5* 9.8*   HCT 32.1* 29.4* 30.9*   MCV 95.8 94.5 95.4    132* 140     BMP:   Recent Labs     22  0524 22  0253 228    134* 141   K 4.1 3.4* 3.2*    100 105   CO2 22 25 25   BUN 11 12 9   CREATININE 0.58* 0.47* 0.29*   GLUCOSE 146* 140* 157*     RADIOLOGY:  CXR: 4/21/22  Impression   The ET tube was 8.5 cm above the lisa.  The NG tube was past the GE   junction.  Unchanged discoid atelectasis was noted in the right middle lobe. No cardiomegaly, interstitial edema or pneumothorax. Amari Pfeiffer MD  4/22/22, 8:14 AM          Trauma Attending Aletha Esparza      I have reviewed the above GCS note(s) and confirmed the key elements of the medical history and physical exam. I have seen and examined the pt. I have discussed the findings, established the care plan and recommendations with Resident, GCS RN, bedside nurse.   Some agitation- fentanyl, precedex,    APRV 20/0; 5.3/.7 - will drop and stretch   TF   seroquel 50 bid   Critical care min: [id number] Dominique Elena DO  4/22/2022  2:17 PM

## 2022-04-23 NOTE — PROGRESS NOTES
Notified trauma resident of patient's low urine output. Verbal orders received for one time dose of Lasix 40mg IV.

## 2022-04-23 NOTE — CARE COORDINATION
Pt intubated, weaning, plan is to extubate. Per prior CM note, SNF and ARU lists were provided to pt's daughters. Placed call to pt's daughter, Yuval Fairchild, possible transition options discussed, discussed that therapy would evaluate pt once he is able to paticipate and per Yuval Fairchild, she will speak with her sister, Stephanie Garcia, and will ask Stephanie Garcia to contact writer with choices for ARU and SNF.

## 2022-04-23 NOTE — PLAN OF CARE
Problem: Non-Violent Restraints  Goal: Removal from restraints as soon as assessed to be safe  4/23/2022 1844 by Zeke Rowell RN  Outcome: Progressing  4/23/2022 0830 by Vidal Kanner, RCP  Outcome: Not Progressing  4/23/2022 0829 by Vidal Kanner, RCP  Outcome: Not Progressing  Goal: No harm/injury to patient while restraints in use  4/23/2022 1844 by Zeke Rowell RN  Outcome: Progressing  4/23/2022 0830 by Vidal Kanner, RCP  Outcome: Not Progressing  4/23/2022 0829 by Vidal Kanner, RCP  Outcome: Not Progressing  Goal: Patient's dignity will be maintained  4/23/2022 1844 by Zeke Rowell RN  Outcome: Progressing  4/23/2022 0830 by Vidal Kanner, RCP  Outcome: Not Progressing  4/23/2022 0829 by Vidal Kanner, RCP  Outcome: Not Progressing     Problem: Confusion - Acute:  Goal: Absence of continued neurological deterioration signs and symptoms  Description: Absence of continued neurological deterioration signs and symptoms  4/23/2022 1844 by Zeke Rowell RN  Outcome: Progressing  4/23/2022 0830 by Vidal Kanner, RCP  Outcome: Not Progressing  4/23/2022 0829 by Vidal Kanner, RCP  Outcome: Not Progressing  Goal: Mental status will be restored to baseline  Description: Mental status will be restored to baseline  4/23/2022 1844 by Zeke Rowell RN  Outcome: Progressing  4/23/2022 0830 by Vidal Kanner, RCP  Outcome: Not Progressing  4/23/2022 0829 by Vidal Kanner, RCP  Outcome: Not Progressing     Problem: Discharge Planning:  Goal: Ability to perform activities of daily living will improve  Description: Ability to perform activities of daily living will improve  4/23/2022 1844 by Zeke Rowell RN  Outcome: Progressing  4/23/2022 0830 by Vidal Kanner, JACINDAP  Outcome: Not Progressing  4/23/2022 0829 by Vidal Kanner, RCP  Outcome: Not Progressing  Goal: Participates in care planning  Description: Participates in care planning  4/23/2022 1844 by Gaby Hooker RN  Outcome: Progressing  4/23/2022 0830 by Duane Cough, RCP  Outcome: Not Progressing  4/23/2022 0829 by Duane Cough, RCP  Outcome: Not Progressing     Problem: Injury - Risk of, Physical Injury:  Goal: Absence of physical injury  Description: Absence of physical injury  4/23/2022 1844 by Gaby Hooker RN  Outcome: Progressing  4/23/2022 0830 by Duane Cough, RCP  Outcome: Not Progressing  4/23/2022 0829 by Duane Cough, RCP  Outcome: Not Progressing  Goal: Will remain free from falls  Description: Will remain free from falls  4/23/2022 1844 by Gaby Hooker RN  Outcome: Progressing  4/23/2022 0830 by Duane Cough, RCP  Outcome: Not Progressing  4/23/2022 0829 by Duane Cough, RCP  Outcome: Not Progressing     Problem: Mood - Altered:  Goal: Mood stable  Description: Mood stable  4/23/2022 1844 by Gaby Hooker RN  Outcome: Progressing  4/23/2022 0830 by Duane Cough, RCP  Outcome: Not Progressing  4/23/2022 0829 by Duane Cough, RCP  Outcome: Not Progressing  Goal: Absence of abusive behavior  Description: Absence of abusive behavior  4/23/2022 1844 by Gaby Hooker RN  Outcome: Progressing  4/23/2022 0830 by Duane Cough, RCP  Outcome: Not Progressing  4/23/2022 0829 by Duane Cough, RCP  Outcome: Not Progressing  Goal: Verbalizations of feeling emotionally comfortable while being cared for will increase  Description: Verbalizations of feeling emotionally comfortable while being cared for will increase  4/23/2022 1844 by Gaby Hooker RN  Outcome: Progressing  4/23/2022 0830 by Duane Cough, RCP  Outcome: Not Progressing  4/23/2022 0829 by Duane Cough, RCP  Outcome: Not Progressing     Problem: Psychomotor Activity - Altered:  Goal: Absence of psychomotor disturbance signs and symptoms  Description: Absence of psychomotor disturbance signs and symptoms  4/23/2022 1844 by Bay Cruz RN  Outcome: Progressing  4/23/2022 0830 by Amanda Quintero RCP  Outcome: Not Progressing  4/23/2022 0829 by Amanda Quintero RCP  Outcome: Not Progressing     Problem: Sensory Perception - Impaired:  Goal: Demonstrations of improved sensory functioning will increase  Description: Demonstrations of improved sensory functioning will increase  4/23/2022 1844 by Bay Cruz RN  Outcome: Progressing  4/23/2022 0830 by Amanda Quintero RCP  Outcome: Not Progressing  4/23/2022 0829 by Amanda Quintero RCP  Outcome: Not Progressing  Goal: Decrease in sensory misperception frequency  Description: Decrease in sensory misperception frequency  4/23/2022 1844 by Bay Cruz RN  Outcome: Progressing  4/23/2022 0830 by Amanda Quintero RCP  Outcome: Not Progressing  4/23/2022 0829 by Amanda Quintero RCP  Outcome: Not Progressing  Goal: Able to refrain from responding to false sensory perceptions  Description: Able to refrain from responding to false sensory perceptions  4/23/2022 1844 by Bay Cruz RN  Outcome: Progressing  4/23/2022 0830 by Amanda Quintero RCP  Outcome: Not Progressing  4/23/2022 0829 by Amanda Quintero RCP  Outcome: Not Progressing  Goal: Demonstrates accurate environmental perceptions  Description: Demonstrates accurate environmental perceptions  4/23/2022 1844 by Bay Cruz RN  Outcome: Progressing  4/23/2022 0830 by Amanda Quintero RCP  Outcome: Not Progressing  4/23/2022 0829 by Amanda Quintero RCP  Outcome: Not Progressing  Goal: Able to distinguish between reality-based and nonreality-based thinking  Description: Able to distinguish between reality-based and nonreality-based thinking  4/23/2022 1844 by Bay Cruz RN  Outcome: Progressing  4/23/2022 0830 by Rebecca Pace, RCP  Outcome: Not Progressing  4/23/2022 0829 by Rebecca Pace, RCP  Outcome: Not Progressing  Goal: Able to interrupt nonreality-based thinking  Description: Able to interrupt nonreality-based thinking  4/23/2022 1844 by Minor Hinds RN  Outcome: Progressing  4/23/2022 0830 by Rebecca Pace, RCP  Outcome: Not Progressing  4/23/2022 0829 by Rebecca Pace, RCP  Outcome: Not Progressing     Problem: Sleep Pattern Disturbance:  Goal: Appears well-rested  Description: Appears well-rested  4/23/2022 1844 by Minor Hinds RN  Outcome: Progressing  4/23/2022 0830 by Rebecca Pace, RCP  Outcome: Not Progressing  4/23/2022 0829 by Rebecca Pace RCP  Outcome: Not Progressing     Problem: Skin Integrity:  Goal: Will show no infection signs and symptoms  Description: Will show no infection signs and symptoms  4/23/2022 1844 by Minor Hinds RN  Outcome: Progressing  4/23/2022 0830 by Rebecca Pace, RCP  Outcome: Not Progressing  4/23/2022 0829 by Rebecca Pace, RCP  Outcome: Not Progressing  Goal: Absence of new skin breakdown  Description: Absence of new skin breakdown  4/23/2022 1844 by Minor Hinds RN  Outcome: Progressing  4/23/2022 0830 by Rebecca Pace, RCP  Outcome: Not Progressing  4/23/2022 0829 by Rebecca Pace, RCP  Outcome: Not Progressing     Problem: Falls - Risk of:  Goal: Absence of physical injury  Description: Absence of physical injury  4/23/2022 1844 by Minor Hinds RN  Outcome: Progressing  4/23/2022 0830 by Rebecca Pace RCP  Outcome: Not Progressing  4/23/2022 0829 by Rebecca Pace, RCP  Outcome: Not Progressing  Goal: Will remain free from falls  Description: Will remain free from falls  4/23/2022 1844 by Minor Hinds RN  Outcome: Progressing  4/23/2022 0830 by Rebecca Pace RCP  Outcome: Not Progressing  4/23/2022 0829 by Aide Schultz RCP  Outcome: Not Progressing     Problem: Discharge Planning  Goal: Discharge to home or other facility with appropriate resources  4/23/2022 1844 by Miriam Joyce RN  Outcome: Progressing  4/23/2022 0830 by Aide Schultz RCP  Outcome: Not Progressing  4/23/2022 0829 by Aide Schultz RCP  Outcome: Not Progressing     Problem: Safety - Medical Restraint  Goal: Remains free of injury from restraints (Restraint for Interference with Medical Device)  Description: INTERVENTIONS:  1. Determine that other, less restrictive measures have been tried or would not be effective before applying the restraint  2. Evaluate the patient's condition at the time of restraint application  3. Inform patient/family regarding the reason for restraint  4.  Q2H: Monitor safety, psychosocial status, comfort, nutrition and hydration  4/23/2022 1844 by Miriam Joyce RN  Outcome: Progressing  4/23/2022 0830 by Aide Schultz RCP  Outcome: Not Progressing  4/23/2022 0829 by Aide Schultz RCP  Outcome: Not Progressing     Problem: Pain  Goal: Verbalizes/displays adequate comfort level or baseline comfort level  4/23/2022 1844 by Miriam Joyce RN  Outcome: Progressing  4/23/2022 0830 by Aide Schultz RCP  Outcome: Not Progressing  4/23/2022 0829 by Aied Schultz RCP  Outcome: Not Progressing     Problem: Nutrition Deficit:  Goal: Optimize nutritional status  4/23/2022 1844 by Miriam Joyce RN  Outcome: Progressing  4/23/2022 0830 by Aide Schultz RCP  Outcome: Not Progressing  4/23/2022 0829 by Aide Schultz RCP  Outcome: Not Progressing     Problem: ABCDS Injury Assessment  Goal: Absence of physical injury  4/23/2022 1844 by Miriam Joyce RN  Outcome: Progressing  4/23/2022 0830 by Aide Schultz RCP  Outcome: Not Progressing  4/23/2022 0829 by Aide Schultz RCP  Outcome: Not Progressing

## 2022-04-23 NOTE — PROGRESS NOTES
ICU PROGRESS NOTE    PATIENT NAME: Antonia Webster  MEDICAL RECORD NO. [id number]  DATE: 4/23/2022    PRIMARY CARE PHYSICIAN: Davy Pollard MD    HD: # 4    ASSESSMENT    Patient Active Problem List   Diagnosis    Insomnia    Moderate persistent asthma with acute exacerbation    Mixed hyperlipidemia    Adverse drug reaction    Liver disease, chronic, due to alcohol ([address]    COPD (chronic obstructive pulmonary disease) with chronic bronchitis (HCC)    Obstructive sleep apnea    Paroxysmal SVT (supraventricular tachycardia) (HCC)    Mild persistent asthma without complication    Dysphagia    History of cervical fracture    Closed fracture of cervical vertebra (HCC)    Cervical radiculopathy    Hypokalemia    Fall    Hypophosphatemia    Acute respiratory failure ([address]    Blood alcohol level of 240 mg/100 ml or more    Anemia    Cervical spine fracture [facility])       MEDICAL DECISION MAKING AND PLAN    Neuro:  - GCS 10T, nodding to questions appropriately, following commands  - Pain/sedation: Precedex, fentanyl gtt, valium 10mg q6, markie 5mg q6 prn, seroquel 50mg q6- wean IV sedation  - Daily ETOH use  - Thiamine and folic acid   - CTH neg for acute injuries  - CT C spine showing C6-7 fx, MRI C spine showing epidural hemorrhage at C6-7 amd prevertebral soft tissue swelling         - MRA neck showing no significant stenosis        - Intubated due to stridor, DL showing some airway edema        - POD#4 ORIF C6-7 by neurosurgery        - SBP <140 per nsg     CV  - OM   - ZMM , no pressor requirements  - LA 0.74 (0.87)  - Hydralazine 10mg q6 prn for SBP goal <140 per nsg  - Troponin 21, EKG stable on admission  - No significant cardiac hx on chart, to clarify with family     Pulm  - Intubated APRV 40% Phigh 10 Thigh 5.7, weaning to CPAP  - ABG 7.46/22.5/97.8/16  - VC 401  - CXR showing high riding ETT  - Possible SBT if mentation allows  - No hx smoking or home O2 requirements     GI/Nutrition  - Diet tube feeds goal 65 ml/hr  - Reglan 10mg q6 for 72 hrs  - Ppx: protonix, glycolax  - CT AP showing no acute traumatic injuries, diverticulosis with jejunal intussusception, small fat containing umbilical hernia     Renal/lytes  - BUN/Cr 10/0.36  - Na/K 142/3.1, K replaced  - Mg/P 2.1/1.8, phos replaced  - 35 ml/hr LR maintenance  - IBW 80kg, total fluid cap 100 ml/hr  - G/XW 9270/9458 in last 24h, 0.4 cc/kg/hr  - 13L pos, US IVC collapsible      Heme  - Hb 10.3 (9.8)  - Plt 134 (140)  - DVT ppx lovenox     7.   Endocrine        - -157        - Continue to monitor, no known hx DM     Micro  - Tmax 37.2  - WBC  5.6 (5.6)  - Monitor off antimicrobials     Family/dispo  - SBT  - Remain in ICU, monitor for withdrawals     Lines  - ETT  - PIV  - A line  - Smyth    CHECKLIST    CAM-ICU RASS: 0  RESTRAINTS: b/l soft wrists  IVF: LR  NUTRITION: tube feeds  ANTIBIOTICS: n/a  GI: pepcid  DVT: lovenox  GLYCEMIC CONTROL: n/a  HOB >45: n/a  MOBILITY: bedrest  SBT: will attempt  IS: n/a    SUBJECTIVE    Gisella Henson was started on seroquel yesterday in attempt to wean down IV sedation for SBT. No acute events overnight. Will attempt SBT today with plan for extubation.     OBJECTIVE  VITALS: Temp: Temp: 98.9 °F (37.2 °C)Temp  Av.3 °F (36.8 °C)  Min: 97.6 °F (36.4 °C)  Max: 98.9 °F (03.2 °C) BP Systolic (92AEI), BPJ:386 , Min:127 , XJR:928   Diastolic (72ULQ), SVB:72, Min:77, Max:95   Pulse Pulse  Av.1  Min: 50  Max: 118 Resp Resp  Av.2  Min: 18  Max: 22 Pulse ox SpO2  Av.5 %  Min: 97 %  Max: 100 %    CONSTITUTIONAL: Sedated, intubated, following commands  HEENT: PERRLA  LUNGS: clear bilaterally  CV: HRRR  GI: abdomen soft and nontender  MUSCULOSKELETAL: intact  NEUROLOGIC: GCS 9T, opening eyes to command, following commands, able to communicate with hand gestures and written words  SKIN: intact    LAB:  CBC:   Recent Labs     22  0253 22  2754 04/23/22  0454   WBC 4.4 5.6 5.6   HGB 9.5* 9.8* 10.3*   HCT 29.4* 30.9* 32.1*   MCV 94.5 95.4 96.1   * 140 134*     BMP:   Recent Labs     04/21/22  0253 04/22/22  0448 04/23/22  0454   * 141 142   K 3.4* 3.2* 3.1*    105 105   CO2 25 25 31   BUN 12 9 10   CREATININE 0.47* 0.29* 0.36*   GLUCOSE 140* 157* 155*     RADIOLOGY:  CXR: 4/22/22  Impression   High-riding ETT persist.  Exact tube position difficult to assess due to   overlying hardware.  Elevated right hemidiaphragm with basilar atelectasis. Edward Kirk MD  4/23/22, 6:29 AM           Trauma Attending Mariama Villeda      I have reviewed the above GCS note(s) and confirmed the key elements of the medical history and physical exam. I have seen and examined the pt. I have discussed the findings, established the care plan and recommendations with Resident, GCS RN, bedside nurse.   SBT today   Critical care min: [address] Janis [address] La Fuented   4/23/2022  1:21 PM

## 2022-04-24 NOTE — PROGRESS NOTES
ICU PROGRESS NOTE    PATIENT NAME: Debbi Park  MEDICAL RECORD NO. [id number]  DATE: 4/24/2022    PRIMARY CARE PHYSICIAN: Pricila Pruitt MD    HD: # 5    ASSESSMENT    Patient Active Problem List   Diagnosis    Insomnia    Moderate persistent asthma with acute exacerbation    Mixed hyperlipidemia    Adverse drug reaction    [facility] disease, [facility], due to [address] [address]    COPD (chronic obstructive pulmonary disease) with chronic bronchitis (HCC)    Obstructive sleep apnea    Paroxysmal SVT (supraventricular tachycardia) (HCC)    Mild persistent asthma without complication    Dysphagia    History of cervical fracture    Closed fracture of cervical vertebra (HCC)    Cervical radiculopathy    Hypokalemia    Fall    Hypophosphatemia    Acute respiratory failure ([facility] [address]    Blood alcohol level of 240 mg/100 ml or more    Anemia    Cervical spine fracture [facility])       MEDICAL DECISION MAKING AND PLAN    Neuro:  - GCS 10T, nodding to questions appropriately, following commands  - Pain/sedation: Precedex, fentanyl gtt, valium 10mg q6, markie 5mg q6 prn, seroquel 50mg q6- wean IV sedation  - Daily ETOH use  - Thiamine and folic acid   - CTH neg for acute injuries  - CT C spine showing C6-7 fx, MRI C spine showing epidural hemorrhage at C6-7 amd prevertebral soft tissue swelling         - MRA neck showing no significant stenosis        - Intubated due to stridor, DL showing some airway edema        - POD#5 ORIF C6-7 by neurosurgery        - SBP <140 per nsg     CV  - HR 57-84  - MAP , no pressor requirements  - LA 0.80 (0.74)  - Hydralazine 10mg q6 prn for SBP goal <140 per nsg  - Troponin 21, EKG stable on admission  - No significant cardiac hx on chart, to clarify with family     Pulm  - Intubated APRV 40% Phigh 10 Thigh 5.7, weaning to CPAP 8/8  - ABG 7.45/45.7/93.5/32.1  -   - CXR showing high riding ETT  - Possible SBT if mentation allows  - No hx smoking or home O2 requirements GI/Nutrition  - Diet tube feeds goal 65 ml/hr  - Reglan 10mg q6 for 72 hrs  - Ppx: protonix, glycolax  - CT AP showing no acute traumatic injuries, diverticulosis with jejunal intussusception, small fat containing umbilical hernia     Renal/lytes  - BUN/Cr 11/0.32  - Na/K 138/3.3, K replaced  - Mg/P 1.7/2. - Total fluid cap 100 ml/hr, LR maintenance  - I/Os 2939/2050 in last 24h, 1 cc/kg/hr  - 13L pos, US IVC collapsible, lasix 40mg x1 yesterday with good response     Heme  - Hb 9.1 (10.3)  - Plt 164 (134)  - DVT ppx lovenox     7. Endocrine        - -162        - Continue to monitor, no known hx DM     Micro  - Tmax 37.3  - WBC  4.6 (5.6)  - Monitor off antimicrobials     Family/dispo  - SBT  - Remain in ICU, monitor for withdrawals     Lines  - ETT  - PIV  - A line  - Smyth     CHECKLIST    CAM-ICU RASS: 0  RESTRAINTS: b/l soft wrists  IVF: LR  NUTRITION: tube feeds  ANTIBIOTICS: n/a  GI: pepcid  DVT: lovenox  GLYCEMIC CONTROL: n/a  HOB >45: n/a  MOBILITY: bedrest  SBT: yes  IS: n/a    SUBJECTIVE    Claudia Landin continues to do well on CPAP . Denies any pain at this time. Aware of plan for extubation.     OBJECTIVE  VITALS: Temp: Temp: 99.1 °F (37.3 °C)Temp  Av.5 °F (36.9 °C)  Min: 98.1 °F (36.7 °C)  Max: 99.1 °F (19.9 °C) BP Systolic (30XQZ), QJE:230 , Min:114 , SHANE:737   Diastolic (34TWV), QLI:95, Min:60, Max:88   Pulse Pulse  Av.3  Min: 57  Max: 119 Resp Resp  Av.5  Min: 11  Max: 26 Pulse ox SpO2  Av.6 %  Min: 94 %  Max: 100 %    CONSTITUTIONAL: Intubated and sedated, GCS 9T and following commands  HEENT: PERRLA  LUNGS: clear bilaterally  CV: HRRR  GI: abdo soft and nontender  MUSCULOSKELETAL: intact  NEUROLOGIC: GCS 9T and following commands  SKIN: intact    LAB:  CBC:   Recent Labs     22  0448 22  0454 22  0332   WBC 5.6 5.6 4.6   HGB 9.8* 10.3* 9.1*   HCT 30.9* 32.1* 27.6*   MCV 95.4 96.1 94.8    134* 164     BMP:   Recent Labs     22 04/23/22  0454 04/24/22  0332    142 138   K 3.2* 3.1* 3.3*    105 100   CO2 25 31 30   BUN 9 10 11   CREATININE 0.29* 0.36* 0.32*   GLUCOSE 157* 155* 162*     RADIOLOGY:  CXR: pending today    4/22/22  Impression   High-riding ETT persist.  Exact tube position difficult to assess due to   overlying hardware. Elevated right hemidiaphragm with basilar atelectasis. Courtney Madrid MD  4/24/22, 8:35 AM              Trauma Attending [facility]      I have reviewed the above GCS note(s) and confirmed the key elements of the medical history and physical exam. I have seen and examined the pt. I have discussed the findings, established the care plan and recommendations with Resident, GCS RN, bedside nurse.   SBT   Critical care min: [address] La FuentedDO  4/24/2022  10:01 PM

## 2022-04-24 NOTE — PLAN OF CARE
Problem: Non-Violent Restraints  Goal: Removal from restraints as soon as assessed to be safe  4/24/2022 1305 by Js Olivera RN  Outcome: Completed  4/24/2022 1038 by Js Olivera RN  Outcome: Progressing  4/24/2022 0301 by Sherry Altamirano RN  Outcome: Progressing  Goal: No harm/injury to patient while restraints in use  4/24/2022 1305 by Js Olivera RN  Outcome: Completed  4/24/2022 1038 by Js Olivera RN  Outcome: Progressing  4/24/2022 0301 by Sherry Altamirano RN  Outcome: Progressing  Goal: Patient's dignity will be maintained  4/24/2022 1305 by Js Olivera RN  Outcome: Completed  4/24/2022 1038 by Js Olivera RN  Outcome: Progressing  4/24/2022 0301 by Sherry Altamirano RN  Outcome: Progressing

## 2022-04-24 NOTE — PLAN OF CARE
Problem: Non-Violent Restraints  Goal: Removal from restraints as soon as assessed to be safe  4/24/2022 0301 by Estefani Fowler RN  Outcome: Progressing  4/23/2022 1844 by Arnoldo Wong RN  Outcome: Progressing  Goal: No harm/injury to patient while restraints in use  4/24/2022 0301 by Estefani Fowler RN  Outcome: Progressing  4/23/2022 1844 by Arnoldo Wong RN  Outcome: Progressing  Goal: Patient's dignity will be maintained  4/24/2022 0301 by Estefani Fowler RN  Outcome: Progressing  4/23/2022 1844 by Arnoldo Wong RN  Outcome: Progressing     Problem: Confusion - Acute:  Goal: Absence of continued neurological deterioration signs and symptoms  Description: Absence of continued neurological deterioration signs and symptoms  4/24/2022 0301 by Estefani Fowler RN  Outcome: Progressing  4/23/2022 1844 by Arnoldo Wong RN  Outcome: Progressing  Goal: Mental status will be restored to baseline  Description: Mental status will be restored to baseline  4/24/2022 0301 by Estefani Fowler RN  Outcome: Progressing  4/23/2022 1844 by Arnoldo Wong RN  Outcome: Progressing     Problem: Discharge Planning:  Goal: Ability to perform activities of daily living will improve  Description: Ability to perform activities of daily living will improve  4/24/2022 0301 by Estefani Fowler RN  Outcome: Progressing  4/23/2022 1844 by Arnoldo Wong RN  Outcome: Progressing  Goal: Participates in care planning  Description: Participates in care planning  4/24/2022 0301 by Estefani Fowler RN  Outcome: Progressing  4/23/2022 1844 by Arnoldo Wong RN  Outcome: Progressing     Problem: Injury - Risk of, Physical Injury:  Goal: Absence of physical injury  Description: Absence of physical injury  4/24/2022 0301 by Estefani Fowler RN  Outcome: Progressing  4/23/2022 1844 by Arnoldo Wong RN  Outcome: Progressing  Goal: Will remain free from falls  Description: Will remain free from falls  4/24/2022 0301 by Maria D Lewis RN  Outcome: Progressing  4/23/2022 1844 by Linda Calhoun RN  Outcome: Progressing     Problem: Mood - Altered:  Goal: Mood stable  Description: Mood stable  4/24/2022 0301 by Maria D Lewis RN  Outcome: Progressing  4/23/2022 1844 by Linda Calhoun RN  Outcome: Progressing  Goal: Absence of abusive behavior  Description: Absence of abusive behavior  4/24/2022 0301 by Maria D Lewis RN  Outcome: Progressing  4/23/2022 1844 by Linda Calhoun RN  Outcome: Progressing  Goal: Verbalizations of feeling emotionally comfortable while being cared for will increase  Description: Verbalizations of feeling emotionally comfortable while being cared for will increase  4/24/2022 0301 by Maria D Lewis RN  Outcome: Progressing  4/23/2022 1844 by Linda Calhoun RN  Outcome: Progressing     Problem: Falls - Risk of:  Goal: Absence of physical injury  Description: Absence of physical injury  4/24/2022 0301 by Maria D Lewis RN  Outcome: Progressing  4/23/2022 1844 by Linda Calhoun RN  Outcome: Progressing  Goal: Will remain free from falls  Description: Will remain free from falls  4/24/2022 0301 by Maria D Lewis RN  Outcome: Progressing  4/23/2022 1844 by Linda Calhoun RN  Outcome: Progressing     Problem: Nutrition Deficit:  Goal: Optimize nutritional status  4/24/2022 0301 by Maria D Lewis RN  Outcome: Progressing  4/23/2022 1844 by Linda Calhoun RN  Outcome: Progressing     Problem: ABCDS Injury Assessment  Goal: Absence of physical injury  4/24/2022 0301 by Maria D Lewis RN  Outcome: Progressing  4/23/2022 1844 by Linda Calhoun RN  Outcome: Progressing

## 2022-04-24 NOTE — PROGRESS NOTES
Order obtained for extubation. SpO2 of 96 on 30% FiO2. Patient extubated and placed on 4 liters/min via nasal cannula. Post extubation SpO2 is 92% with HR  98 bpm and RR 22 breaths/min. Patient had moderate cough that was productive of yellow sputum. Extubation Well tolerated by patient. .   Breath Sounds: clear diminished    COLLIN SALGADO RCP   12:32 PM

## 2022-04-24 NOTE — PLAN OF CARE
Problem: Non-Violent Restraints  Goal: Removal from restraints as soon as assessed to be safe  4/24/2022 1038 by Jovany Brasher RN  Outcome: Progressing  4/24/2022 0301 by Jenise Blunt RN  Outcome: Progressing  Goal: No harm/injury to patient while restraints in use  4/24/2022 1038 by Jovany Brasher RN  Outcome: Progressing  4/24/2022 0301 by Jenise Blunt RN  Outcome: Progressing  Goal: Patient's dignity will be maintained  4/24/2022 1038 by Jovany Brasher RN  Outcome: Progressing  4/24/2022 0301 by Jenise Blunt RN  Outcome: Progressing     Problem: Confusion - Acute:  Goal: Absence of continued neurological deterioration signs and symptoms  Description: Absence of continued neurological deterioration signs and symptoms  4/24/2022 0301 by Jenise Blunt RN  Outcome: Progressing  Goal: Mental status will be restored to baseline  Description: Mental status will be restored to baseline  4/24/2022 0301 by Jenise Blunt RN  Outcome: Progressing     Problem: Discharge Planning:  Goal: Ability to perform activities of daily living will improve  Description: Ability to perform activities of daily living will improve  4/24/2022 0301 by Jenise Blunt RN  Outcome: Progressing  Goal: Participates in care planning  Description: Participates in care planning  4/24/2022 0301 by Jenise Blunt RN  Outcome: Progressing     Problem: Injury - Risk of, Physical Injury:  Goal: Absence of physical injury  Description: Absence of physical injury  4/24/2022 0301 by Jenise Blunt RN  Outcome: Progressing  Goal: Will remain free from falls  Description: Will remain free from falls  4/24/2022 0301 by Jenise Blunt RN  Outcome: Progressing     Problem: Mood - Altered:  Goal: Mood stable  Description: Mood stable  4/24/2022 0301 by Jenise Blunt RN  Outcome: Progressing  Goal: Absence of abusive behavior  Description: Absence of abusive behavior  4/24/2022 0301 by Jenise Blunt RN  Outcome: Progressing  Goal: Verbalizations of feeling emotionally comfortable while being cared for will increase  Description: Verbalizations of feeling emotionally comfortable while being cared for will increase  4/24/2022 0301 by Kylah Stewart RN  Outcome: Progressing     Problem: Psychomotor Activity - Altered:  Goal: Absence of psychomotor disturbance signs and symptoms  Description: Absence of psychomotor disturbance signs and symptoms  4/24/2022 0301 by Kylah Stewart RN  Outcome: Progressing     Problem: Sensory Perception - Impaired:  Goal: Demonstrations of improved sensory functioning will increase  Description: Demonstrations of improved sensory functioning will increase  4/24/2022 0301 by Kylah Stewart RN  Outcome: Progressing  Goal: Decrease in sensory misperception frequency  Description: Decrease in sensory misperception frequency  4/24/2022 0301 by Kylah Stewart RN  Outcome: Progressing  Goal: Able to refrain from responding to false sensory perceptions  Description: Able to refrain from responding to false sensory perceptions  4/24/2022 0301 by Kylah Stweart RN  Outcome: Progressing  Goal: Demonstrates accurate environmental perceptions  Description: Demonstrates accurate environmental perceptions  4/24/2022 0301 by Kylah Stewart RN  Outcome: Progressing  Goal: Able to distinguish between reality-based and nonreality-based thinking  Description: Able to distinguish between reality-based and nonreality-based thinking  4/24/2022 0301 by Kylah Stewart RN  Outcome: Progressing  Goal: Able to interrupt nonreality-based thinking  Description: Able to interrupt nonreality-based thinking  4/24/2022 0301 by Kylah Stewart RN  Outcome: Progressing     Problem: Sleep Pattern Disturbance:  Goal: Appears well-rested  Description: Appears well-rested  4/24/2022 0301 by Kylah Stewart RN  Outcome: Progressing     Problem: Skin Integrity:  Goal: Will show no infection signs and symptoms  Description: Will show no infection signs and symptoms  4/24/2022 0301 by Jasmyn Alonzo RN  Outcome: Progressing  Goal: Absence of new skin breakdown  Description: Absence of new skin breakdown  4/24/2022 0301 by Jasmyn Alonzo RN  Outcome: Progressing     Problem: Falls - Risk of:  Goal: Absence of physical injury  Description: Absence of physical injury  4/24/2022 0301 by Jasmyn Alonzo RN  Outcome: Progressing  Goal: Will remain free from falls  Description: Will remain free from falls  4/24/2022 0301 by Jasmyn Alonzo RN  Outcome: Progressing     Problem: Discharge Planning  Goal: Discharge to home or other facility with appropriate resources  4/24/2022 0301 by Jasmyn Alonzo RN  Outcome: Progressing     Problem: Safety - Medical Restraint  Goal: Remains free of injury from restraints (Restraint for Interference with Medical Device)  Description: INTERVENTIONS:  1. Determine that other, less restrictive measures have been tried or would not be effective before applying the restraint  2. Evaluate the patient's condition at the time of restraint application  3. Inform patient/family regarding the reason for restraint  4.  Q2H: Monitor safety, psychosocial status, comfort, nutrition and hydration  4/24/2022 0301 by Jasmyn Alonzo RN  Outcome: Progressing     Problem: Pain  Goal: Verbalizes/displays adequate comfort level or baseline comfort level  4/24/2022 0301 by Jasmyn Alonzo RN  Outcome: Progressing     Problem: Nutrition Deficit:  Goal: Optimize nutritional status  4/24/2022 0301 by Jasmyn Alonzo RN  Outcome: Progressing     Problem: ABCDS Injury Assessment  Goal: Absence of physical injury  4/24/2022 0301 by Jasmyn Alonzo RN  Outcome: Progressing

## 2022-04-24 NOTE — PROGRESS NOTES
Physical Therapy        Physical Therapy Cancel Note      DATE: 2022    NAME: Carmelita Gomes  MRN: [id number]   : 1955      Patient not seen this date for Physical Therapy due to:    Patient is not appropriate for PT evaluation/treatment at this time d/t RN CX, pt extubated and resting, therapist will CB if time allows or resume 22      Electronically signed by Maritza Woodard PTA on 2022 at 1:26 PM

## 2022-04-24 NOTE — CARE COORDINATION
Received message from pt's daughter, David Jimenez called back. Received SNF choices of 1) [facility], 2)[facility], and 3) [facility]. Also received ARU choices of 1)[facility], 2) [facility] [address] [place] [address], and 3) [facility]. Discussed that transition plans were dependent upon patient and pt's needs, therapy to evaluate. Goran Hernandez also inquired as to social work meeting with pt to provide alcohol rehab choices, SW consult was placed and per SW note, will meet with pt when he is able to participate. 1022 - SNF referrals sent to [facility] and ARU referral was sent to [address] [place] [address]. Additional referrals will be sent, if needed. 1300 - Spoke with Milo Azul at [facility], they will do an on-site visit tomorrow. 1305 - Attempted to call [facility]) and call did not go through x3.

## 2022-04-25 NOTE — PROGRESS NOTES
Physician Progress Note      Baljinder Guillermo  CSN #:                  [id number]  :                       1955  ADMIT DATE:       2022 7:31 AM  DISCH DATE:  RESPONDING  PROVIDER #:        Jazmine Whittaker MD          QUERY TEXT:    Pt admitted with C-6 FX from fall with alcohol intoxication. Pt noted to have   HMG 11.8>9.5 post op. If possible, please document in the progress notes and   discharge summary if you are evaluating and/or treating any of the following: The medical record reflects the following:  Risk Factors: ETOH Intoxication ETOH 240, C6 fx from fall, acute respiratory   failure, Rhabdomyolysis, Alcohol abuse  Clinical Indicators: HMG 11.8>9.5. OP Note  Open reduction internal   fixation of unstable C6-7 fracture dislocation C6-7 and C7-T1 complete   discectomy and anterior arthrodesis C7 corpectomy. EBL <50cc  Treatment: IV fluids, labs, Consult Neurosurgery, CXR, MRI Cervical,   Ventilator support  Options provided:  -- Acute blood loss anemia  -- Chronic blood loss anemia  -- Acute on chronic blood loss anemia  -- Postoperative acute blood loss anemia  -- Dilutional anemia  -- Other - I will add my own diagnosis  -- Disagree - Not applicable / Not valid  -- Disagree - Clinically unable to determine / Unknown  -- Refer to Clinical Documentation Reviewer    PROVIDER RESPONSE TEXT:    Anemia is due to dilution. Query created by:  Alpha Boeck on 2022 8:00 PM      Electronically signed by:  Jazmine Whittaker MD 2022 6:36 PM

## 2022-04-25 NOTE — PLAN OF CARE
Problem: Confusion - Acute:  Goal: Absence of continued neurological deterioration signs and symptoms  Description: Absence of continued neurological deterioration signs and symptoms  Outcome: Progressing  Goal: Mental status will be restored to baseline  Description: Mental status will be restored to baseline  Outcome: Progressing     Problem: Discharge Planning:  Goal: Ability to perform activities of daily living will improve  Description: Ability to perform activities of daily living will improve  Outcome: Progressing  Goal: Participates in care planning  Description: Participates in care planning  Outcome: Progressing     Problem: Psychomotor Activity - Altered:  Goal: Absence of psychomotor disturbance signs and symptoms  Description: Absence of psychomotor disturbance signs and symptoms  Outcome: Progressing     Problem: Sensory Perception - Impaired:  Goal: Demonstrations of improved sensory functioning will increase  Description: Demonstrations of improved sensory functioning will increase  Outcome: Progressing  Goal: Decrease in sensory misperception frequency  Description: Decrease in sensory misperception frequency  Outcome: Progressing  Goal: Able to refrain from responding to false sensory perceptions  Description: Able to refrain from responding to false sensory perceptions  Outcome: Progressing  Goal: Demonstrates accurate environmental perceptions  Description: Demonstrates accurate environmental perceptions  Outcome: Progressing  Goal: Able to distinguish between reality-based and nonreality-based thinking  Description: Able to distinguish between reality-based and nonreality-based thinking  Outcome: Progressing  Goal: Able to interrupt nonreality-based thinking  Description: Able to interrupt nonreality-based thinking  Outcome: Progressing     Problem: Pain  Goal: Verbalizes/displays adequate comfort level or baseline comfort level  Outcome: Progressing

## 2022-04-25 NOTE — PROGRESS NOTES
Speech Language Pathology  Facility/Department: [facility]   CLINICAL BEDSIDE SWALLOW EVALUATION    NAME: Brenda Banegas  : 1955  MRN: [id number]    ADMISSION DATE: 2022  ADMITTING DIAGNOSIS: has Insomnia; Moderate persistent asthma with acute exacerbation; Mixed hyperlipidemia; Adverse drug reaction; Liver disease, chronic, due to alcohol ([address] COPD (chronic obstructive pulmonary disease) with chronic bronchitis ([address] Obstructive sleep apnea; Paroxysmal SVT (supraventricular tachycardia) ([address] Mild persistent asthma without complication; Dysphagia; History of cervical fracture; Closed fracture of cervical vertebra ([address] Cervical radiculopathy; Hypokalemia; Fall; Hypophosphatemia; Acute respiratory failure ([address] Blood alcohol level of 240 mg/100 ml or more; Anemia; and Cervical spine fracture (HCC) on their problem list.      Date of Eval: 2022  Evaluating Therapist: ALEAH Martin    Current Diet level:  Current Diet : NPO      Primary Charles Garcia is a 75 y.o. male that presented to the Emergency Department following a fall down approximately 15 steps with LOC. Patient does not recall the incident leading to his arrival. He states he was down overnight and called EMS this morning. He endorses drinking alcohol, states he drinks about 1/5 of vodka in a week. He is endorsing pain in his neck but denying pain, numbness, tingling, or weakness elsewhere. Pain:  Pain Assessment  Pain Assessment: Critical Care Pain Observation Tool (CPOT)  Pain Level: 0    Reason for Referral  Brenda Banegas was referred for a bedside swallow evaluation to assess the efficiency of his swallow function, identify signs and symptoms of aspiration and make recommendations regarding safe dietary consistencies, effective compensatory strategies, and safe eating environment. Impression Pt. with wet/gurgly vocal quality prior to all PO given.   RN reports pt. has been coughing up phelgm and using suction. At baseline, family reports pt. is a \"phlegmy debby\" and is constantly spitting up phlegm. Pt. with increased wet/gurgly vocal quality after consistencies given. Pt. also with throat clearing after consistencies. Eventually pt. Using suction. Oral phase is functional for consistencies tested. ST to recommend NPO and modified barium swallow study to r/o/confirm aspiration. Results and recommendations reported to RN. Treatment Plan  Requires SLP Intervention: Yes  D/C Recommendations: To be determined       Recommended Diet and Intervention  Solid Recommendation: NPO  Liquid Recommendation: NPO  Recommended Form of Meds: Via alternative means of nutrition  Recommendations: NPO;Modified barium swallow study    Treatment/Goals  Short-term Goals  Goal 1: Pt. will complete OMEX for dysphagia 10-20 x per session. Dysphagia Goals: The patient will tolerate instrumental swallowing procedure    General  Chart Reviewed: Yes  Behavior/Cognition: Alert; Cooperative  Temperature Spikes Noted: No  Follows Directions: Simple  Patient Positioning: Upright in bed  Consistencies Administered: Pureed; Thin - straw; Thin - teaspoon;Mildly Thick - straw;Mildly Thick- teaspoon    Vision/Hearing  Vision  Vision: Within Functional Limits  Hearing  Hearing: Within functional limits    Oral Phase Dysfunction  Oral Phase  Oral Phase: WFL  Oral Phase  Oral Phase - Comment: WFL for all consistencies tested. Indicators of Pharyngeal Phase Dysfunction   Pharyngeal Phase   Pharyngeal Phase: Pt. with wet/gurgly vocal quality prior to all PO given. RN reports pt. has been coughing up phelgm and using suction. At baseline, family reports pt. is a \"phlegmy debby\" and is constantly spitting up phlegm. Pt. with increased wet/gurgly vocal quality after consistencies given. Pt. also with throat clearing after consistencies.     Prognosis  Prognosis  Prognosis for safe diet advancement: fair  Individuals consulted  Consulted and agree with results and recommendations: Patient;RN  RN Name: Mariely Del Rio    Education  Patient Education: yes  Patient Education Response: Verbalizes understanding             Therapy Time  Start: 1400  End: [address], [address], [place]  CCC-SLP  4/25/2022 2:57 PM

## 2022-04-25 NOTE — PROCEDURES
INSTRUMENTAL SWALLOW REPORT  MODIFIED BARIUM SWALLOW    NAME: Claudia Landin   : 1955  MRN: [id number]       Date of Eval: 2022              Referring Diagnosis(es):      Past Medical History:  has a past medical history of Asthma, Calculus of gallbladder without mention of cholecystitis or obstruction, Chronic back pain, Hyperlipidemia, Insomnia, unspecified, Spinal stenosis, unspecified region other than cervical, and Urinary incontinence. Past Surgical History:  has a past surgical history that includes Tonsillectomy; Colonoscopy; pr colonoscopy flx dx w/collj spec when pfrmd (N/A, 2018); Upper gastrointestinal endoscopy (2018); and cervical fusion (N/A, 2022). Type of Study: Initial MBS         Patient Complaints/Reason for Referral:  Claudia Landin was referred for a MBS to assess the efficiency of his/her swallow function, assess for aspiration, and to make recommendations regarding safe dietary consistencies, effective compensatory strategies, and safe eating environment. Onset of problem: Kaci Henson is a 75 y. o. male that presented to the Emergency Department following a fall down approximately 15 steps with LOC. Patient does not recall the incident leading to his arrival. He states he was down overnight and called EMS this morning. He endorses drinking alcohol, states he drinks about 1/5 of vodka in a week. He is endorsing pain in his neck but denying pain, numbness, tingling, or weakness elsewhere. Behavior/Cognition/Vision/Hearing:  Behavior/Cognition: Cooperative; Alert    Impressions:  Recommend pt remain NPO with alternative means of nutrition. Pt with +mod-max pharyngeal residue following puree, min-mod vallecula residue following nectar and thin. +penetration with puree and nectar, +penetration and aspiration with thin. +coughing following all consistencies, +expectoration of phlegm/bolus.  Note no epiglottic inversion with all consistencies tested. Patient Position: Lateral and        Consistencies Administered: Pureed;Mildly Thick teaspoon;Mildly Thick straw; Thin straw            Dysphagia Outcome Severity Scale: Level 1: Severe dysphagia- NPO. Unable to tolerate any PO safely  Penetration-Aspiration Scale (PAS): 7 - Material enters the airway, passes below the vocal folds, and is not ejected from the trachea despite effort    Recommended Diet:  Solid consistency: NPO  Liquid consistency: NPO       Medication administration: Via NG/PEG    Recommendations/Treatment  Requires SLP Intervention: Yes        D/C Recommendations: Ongoing speech therapy is recommended at next level of care         Recommended Exercises: BOT, pharyngeal strengthening, effortful swallow, Jaqueline             Education: Images and recommendations were reviewed with Pt, RN  following this exam.     Patient Education: yes  Patient Education Response: Verbalizes understanding    Prognosis  Prognosis for safe diet advancement: good  Duration/Frequency of Treatment  Frequency of Treatment: 3-5x/week  Safety Devices  Restraints Initially in Place: No      Goals:    Long Term: To Maximize safety with intake, optimize nutrition/hydration and minimize risk for aspiration. Repeat MBSS Recommended in     5-7      Days. Short Term:     Goal 1: Pt will complete OMEX for dysphagia x10-20/session. Dysphagia Goals: The patient will tolerate instrumental swallowing procedure      Oral Preparation / Oral Phase   Puree: +lingual pumping. Pharyngeal Phase   Puree: +penetration, no aspiration. +mod-max vallecula residue, limited visibility of pyriform due to body habitus. Nectar spoon: No pen, no asp. +min-mod vallec residue. Nectar straw: +penetration, no aspiration. Thin straw: +penetration, +aspiration.  +coughing with expectoration of bolus material/phlegm        Pain   Read Only-Patient Currently in Pain: Other (comment) (CPOT)  Pain Level: 10  Pain Type: Acute pain  Pain Location: Back      Therapy Time:   Individual Concurrent Group Co-treatment   Time In  1510         Time Out  [address] [place], 4/25/2022, 4:02 PM

## 2022-04-25 NOTE — CARE COORDINATION
Transitional Planning    Received phone call from Tonya at [facility]. They are unable to accept. Patient;s insurance is OON and he does not have OON benefits. 1011 received VM from Anna Marie Zuniga at [facility] [phone] that they are unable to accept. Insurance is OON. 1014 referral sent to [facility] [place], [facility]    7807 returned phone call to Celia Anne [phone] at [facility]. She  Received referral and is following.  Patient needs to have restlessness and agitation from alcohol withdrawal under better control before they can accept

## 2022-04-25 NOTE — FLOWSHEET NOTE
Writed attempted NG in left nostril x2 and could not place. Trauma resident notified. Will allow patient time to rest and attempt Flexi Flow placement in an hour.

## 2022-04-25 NOTE — PROGRESS NOTES
Occupational Therapy  Facility/Department: [facility] CAR 1  Occupational Therapy Initial Assessment    Name: Felecia Haro  : 1955  MRN: [id number]  Date of Service: 2022     Chief Complaint   Patient presents with   Floydene Ada Fall     transfer from [facility]        Discharge Recommendations:  Patient would benefit from continued therapy after discharge Further therapy recommended at discharge. The patient should be able to tolerate at least 3 hours of therapy per day over 5 days or 15 hours over 7 days. This patient may benefit from a Physical Medicine and Rehab consult. OT Equipment Recommendations  Equipment Needed: Yes   *Equipment recommendations listed below are based on what the patient would need if they were able to return to prior living arrangements at the time of discharge. Mobility Devices: Robyn Findlay; ADL Assistive Devices  Walker: Rolling  ADL Assistive Devices: Toileting - 3-in-1 Commode;Transfer Tub Bench;Long-handled Shoe [place];Long-handled Sponge;Sock-Aid Hard;Reacher       Patient Diagnosis(es): The primary encounter diagnosis was Closed nondisplaced fracture of sixth cervical vertebra, unspecified fracture morphology, initial encounter ([facility] [address] Diagnoses of Closed nondisplaced fracture of seventh cervical vertebra, unspecified fracture morphology, initial encounter ([facility] [address] and Alcoholic ketoacidosis were also pertinent to this visit. Past Medical History:  has a past medical history of Asthma, Calculus of gallbladder without mention of cholecystitis or obstruction, Chronic back pain, Hyperlipidemia, Insomnia, unspecified, Spinal stenosis, unspecified region other than cervical, and Urinary incontinence. Past Surgical History:  has a past surgical history that includes Tonsillectomy; Colonoscopy; pr colonoscopy flx dx w/collj spec when pfrmd (N/A, 2018); Upper gastrointestinal endoscopy (2018); and cervical fusion (N/A, 2022).            Assessment   Performance deficits / Impairments: Decreased functional mobility ; Decreased ADL status; Decreased strength;Decreased safe awareness;Decreased cognition;Decreased endurance;Decreased balance;Decreased high-level IADLs;Decreased coordination  Assessment: Pt agreeable to OT eval this date. Pt completes bed mobility with Mod A for trunk progression. Pt ed on proper fit and how to don/doff/adjust c-collar with fair return. Pt completed functional transfers and short functional mobility with Mod A and RW. With short functional mobility task, pt HR elevates to ~125bpm. Pt demonstrates decreased cognition requiring increased time and repetition to follow commands. Pt exhibits deficits in [address] and strength in B hands this date.  Pt will require continued OT services to address above deficits listed in order to regain functional independence in ADLs/IADLs and functional transfers/mobility  Prognosis: Good  Decision Making: High Complexity  REQUIRES OT FOLLOW-UP: Yes  Activity Tolerance  Activity Tolerance: Patient Tolerated treatment well;Treatment limited secondary to decreased cognition        Plan   Plan  Times per Week: 4-5 x/wk  Current Treatment Recommendations: Strengthening,Balance training,Functional mobility training,Endurance training,Cognitive reorientation,Safety education & training,Patient/Caregiver education & training,Equipment evaluation, education, & procurement,Self-Care / ADL,Home management training,Cognitive/Perceptual training,Coordination training     Restrictions  Restrictions/Precautions  Restrictions/Precautions: Seizure,Fall Risk  Required Braces or Orthoses?: Yes  Required Braces or Orthoses  Cervical: c-collar (Aspen cervical collar at all times except during hygiene)  Position Activity Restriction  Other position/activity restrictions: SBP<140, s/p ACDF C6-7 04/19/2022    Subjective   General  Patient assessed for rehabilitation services?: Yes  Family / Caregiver Present: No  General Comment  Comments: RN ok'd pt for OT eval this date. Pt agreeable to session and pleasant/cooperative throughout  Pre Treatment Pain Screening  Comments / Details: Pt denies pain at this time     Social/Functional History  Social/Functional History  Lives With: Alone  Type of Home: House  Home Layout: Two level,Bed/Bath upstairs  Home Access: Stairs to enter with rails  Entrance Stairs - Number of Steps: 5  Entrance Stairs - Rails: Both  Bathroom Shower/Tub: Tub/Shower unit  Bathroom Toilet: Handicap height  Bathroom Equipment: Grab bars in shower  Home Equipment:  (pt reports not owning or using any DME at a baseline.)  Has the patient had two or more falls in the past year or any fall with injury in the past year?: Yes  ADL Assistance: [address]: Independent  Homemaking Responsibilities: Yes  Ambulation Assistance: Independent  Transfer Assistance: Independent  Active : Yes  Mode of Transportation: [facility]  Occupation: Retired  Leisure & Hobbies: golfing, spending time with grandkids  Additional Comments: Pt reports daughters work but would be able to assist when not at work. Objective   Vision Exceptions: Wears glasses for distance  Hearing: Exceptions to [facility]  Hearing Exceptions: Hard of hearing/hearing concerns         Safety Devices  Type of Devices: Gait belt;Nurse notified; Left in chair;Chair alarm in place;Call light within reach  Restraints  Restraints Initially in Place: No     Balance  Sitting Balance: Contact guard assistance (CGA for static sitting and Min A for dynamic sitting/reaching; total sitting time ~20 min)  Standing Balance: Minimal assistance  Standing Balance  Time: ~1 min  Activity: standing prior to and following functional mobility with RW  Functional Mobility  Functional - Mobility Device: Rolling Walker  Activity: Other  Assist Level: Moderate assistance  Functional Mobility Comments: Pt completed short functional mobility from EOB > recliner utilizing RW and requiring Mod A d/t balance deficits.  Pt demonstrates impulsive behaviors and difficulty with motor planning requiring VCs/TCs for appropriate RW navigation     AROM: Within functional limits  Strength: Generally decreased, functional (not formally assessed d/t cervical precautions; hand strength assessed and grossly 4-/5)  Coordination: Generally decreased, functional (mildly decreased [address] noted with opposition testing)  Tone: Normal  Sensation: Intact     ADL  Feeding: Modified independent ;Setup  Grooming: Modified independent ;Setup  UE Bathing: Minimal assistance;Setup; Increased time to complete;Verbal cueing  LE Bathing: Setup;Verbal cueing; Increased time to complete; Moderate assistance  UE Dressing: Minimal assistance;Setup;Verbal cueing; Increased time to complete  LE Dressing: Setup;Verbal cueing; Increased time to complete; Moderate assistance  Toileting: Moderate assistance;Setup; Increased time to complete; Adaptive equipment    Activity Tolerance  Activity Tolerance: Patient limited by fatigue;Patient limited by endurance;Treatment limited secondary to decreased cognition  Bed mobility  Supine to Sit: Moderate assistance (assist for trunk progression)  Sit to Supine:  (pt retired up to recliner chair at the conclusion of today's session)  Scooting: Minimal assistance  Comment: HOB elevated during supine > sit ~30 degrees; VCs for log roll tech with fair return  Transfers  Sit to stand: Moderate assistance  Stand to sit: Moderate assistance  Transfer Comments: 2 VCs for hand placement with poor return carryover     Cognition  Overall Cognitive Status: Exceptions  Arousal/Alertness: Delayed responses to stimuli  Following Commands: Follows multistep commands with increased time; Follows multistep commands with repitition  Attention Span: Attends with cues to redirect  Safety Judgement: Decreased awareness of need for assistance;Decreased awareness of need for safety  Problem Solving: Assistance required to generate solutions;Assistance required to implement solutions;Assistance required to identify errors made;Assistance required to correct errors made  Insights: Decreased awareness of deficits  Initiation: Requires cues for some  Sequencing: Requires cues for some                  Education Given To: Patient  Education Provided Comments: Pt ed on OT role, OT POC, safety awareness, how to don/doff/adjust c-collar, c-spine precautions, bed mobility training, transfer training, DME use, and importance of continued OT. Pt with fair return only this date d/t cognition.  Continued education is recommended to ensure carryover  Education Method: Demonstration;Verbal  Barriers to Learning: Cognition  Education Outcome: Continued education needed;Verbalized understanding     LUE AROM (degrees)  LUE AROM : WFL  LUE General AROM: shoulder flexion assessed to 90 degrees flexion only d/t c-spine precautions; all other joints WFL  Left Hand AROM (degrees)  Left Hand AROM: WFL  RUE AROM (degrees)  RUE AROM : WFL  RUE General AROM: shoulder flexion assessed to 90 degrees flexion only d/t c-spine precautions; all other joints WFL  Right Hand AROM (degrees)  Right Hand AROM: WFL       Hand Dominance  Hand Dominance: Right            AM-PAC Score        AM-[facility] Inpatient Daily Activity Raw Score: 17 (04/25/22 1606)  AM-PAC Inpatient ADL T-Scale Score : 37.26 (04/25/22 1606)  ADL Inpatient CMS 0-100% Score: 50.11 (04/25/22 1606)  ADL Inpatient CMS G-Code Modifier : CK (04/25/22 1606)    Goals  Short Term Goals  Time Frame for Short term goals: By discharge, pt will:  Short Term Goal 1: Demo SBA for functional transfers and functional mobility with use of LRD for improved independence in ADLs/IADLs  Short Term Goal 2: Demo Mod I for UB ADLs with adaptive tech PRN  Short Term Goal 3: Demo SBA for LB ADLs and toileting tasks with setup and AE PRN  Short Term Goal 4: Demo 8+ min dynamic standing task with SBA and LRD with unilateral hand release for participation in ADLs/IADLs  Short Term Goal 5: Follow 75% of 3-step commands with appropriate initiation and sequencing to address cognitive deficits and improve functional independence with meaningful occupations       Therapy Time   Individual Concurrent Group Co-treatment   Time In 0918         Time Out 0953         Minutes 35         Timed Code Treatment Minutes: [address], OTR/L

## 2022-04-25 NOTE — PLAN OF CARE
BRONCHOSPASM/BRONCHOCONSTRICTION     [x]         IMPROVE AERATION/BREATH SOUNDS  [x]   ADMINISTER BRONCHODILATOR THERAPY AS APPROPRIATE  [x]   ASSESS BREATH SOUNDS  []   IMPLEMENT AEROSOL/MDI PROTOCOL  [x]   PATIENT EDUCATION AS NEEDED    Inhaler / Aerosol Education        [x] Served spacer    [] Provided and reviewed booklet   [x] Good return demonstration per patient   [x] Aerosolized Medications:     Verbal education has been provided in the use, benefits and possible adverse reactions of aerosolized medications used in the treatment of this patient.     [] Other:

## 2022-04-25 NOTE — PROGRESS NOTES
Physical Therapy  Facility/Department: [facility] CAR 1  Physical Therapy Initial Assessment    Name: Yonathan Motley  : 1955  MRN: [id number]  Date of Service: 2022  Chief Complaint   Patient presents with   [place] Fall     transfer from [facility]       Discharge Recommendations:  Patient would benefit from continued therapy after discharge   PT Equipment Recommendations  Equipment Needed: Yes  Mobility Devices: Pearletha Holiness: Rolling      Patient Diagnosis(es): The primary encounter diagnosis was Closed nondisplaced fracture of sixth cervical vertebra, unspecified fracture morphology, initial encounter ([facility] [address] Diagnoses of Closed nondisplaced fracture of seventh cervical vertebra, unspecified fracture morphology, initial encounter ([facility] [address] and Alcoholic ketoacidosis were also pertinent to this visit. Past Medical History:  has a past medical history of Asthma, Calculus of gallbladder without mention of cholecystitis or obstruction, Chronic back pain, Hyperlipidemia, Insomnia, unspecified, Spinal stenosis, unspecified region other than cervical, and Urinary incontinence. Past Surgical History:  has a past surgical history that includes Tonsillectomy; Colonoscopy; pr colonoscopy flx dx w/collj spec when pfrmd (N/A, 2018); Upper gastrointestinal endoscopy (2018); and cervical fusion (N/A, 2022). Assessment   Body Structures, Functions, Activity Limitations Requiring Skilled Therapeutic Intervention: Decreased functional mobility ; Decreased ROM; Decreased strength;Decreased endurance;Decreased safe awareness;Decreased coordination  Assessment: Pt with mobility deficits requiring mod-A to perform sit<>stand transfer and to ambulate 6 feet with a RW. Pt with poor motor planning this date, demonstrates significantly impaired standing balance, decreased BLE strength, and impaired endurance. Pt would be unsafe to return to prior living arrangements upon discharge secondary to high fall risk.   Pt would benefit from additional therapy upon discharge. Therapy Prognosis: Good  Decision Making: Medium Complexity  Requires PT Follow-Up: Yes  Activity Tolerance  Activity Tolerance: Patient limited by fatigue;Patient limited by endurance;Treatment limited secondary to decreased cognition     Plan   Plan  Plan: 5-7 times per week  Current Treatment Recommendations: ROM,Strengthening,Functional mobility training,Patient/Caregiver education & training,Balance training,Transfer training,Safety education & training,Home exercise program,Equipment evaluation, education, & procurement,Gait training,Endurance training,Stair training,Therapeutic activities  Safety Devices  Type of Devices: Gait belt,Nurse notified,Heels elevated for pressure relief,Patient at risk for falls,Left in chair,Chair alarm in place,All fall risk precautions in place  Restraints  Restraints Initially in Place: No  Restraints: bilateral wrist restraints and 4 rails up     Restrictions  Restrictions/Precautions  Restrictions/Precautions: Surgical Protocols,Seizure,Fall Risk  Required Braces or Orthoses?: Yes  Required Braces or Orthoses  Cervical: c-collar  Position Activity Restriction  Other position/activity restrictions: Irma Alejandro down a flight of steps. Cervical fracture. ORIF C6-7 done 4/19/22. Cervical collar on at all times. Subjective   General  Patient assessed for rehabilitation services?: Yes  Response To Previous Treatment: Not applicable  Family / Caregiver Present: No  Follows Commands: Within Functional Limits  Subjective  Subjective: Pt supine in bed and agreeable to therapy, RN agreeable to therapy. Pt pleasant and cooperative throughout today's session.          Social/Functional History  Social/Functional History  Lives With: Alone  Type of Home: House  Home Layout: Two level,Bed/Bath upstairs  Home Access: Stairs to enter with rails  Entrance Stairs - Number of Steps: 5  Entrance Stairs - Rails: Both  Bathroom Shower/Tub: Tub/Shower unit  Bathroom Toilet: Handicap height  Bathroom Equipment: Grab bars in shower  Home Equipment:  (pt reports not owning or using any DME at a baseline.)  Has the patient had two or more falls in the past year or any fall with injury in the past year?: Yes  ADL Assistance: [address]: Independent  Homemaking Responsibilities: Yes  Ambulation Assistance: Independent  Transfer Assistance: Independent  Active : Yes  Mode of Transportation: [facility]  Occupation: Retired  Leisure & Hobbies: golfing, spending time with [employer]  Additional Comments: Pt reports daughters work but would be able to assist when not at work. Vision/Hearing  Vision Exceptions: Wears glasses for distance  Hearing: Exceptions to [place]  Hearing Exceptions: Hard of hearing/hearing concerns    Cognition   Orientation  Overall Orientation Status: Within Functional Limits  Cognition  Overall Cognitive Status: Exceptions  Arousal/Alertness: Delayed responses to stimuli  Following Commands: Follows multistep commands with increased time  Attention Span: Attends with cues to redirect  Safety Judgement: Decreased awareness of need for assistance;Decreased awareness of need for safety  Insights: Decreased awareness of deficits  Initiation: Requires cues for some  Sequencing: Requires cues for some     Objective         Gross Assessment  Sensation: Intact     AROM RLE (degrees)  RLE AROM: WFL  AROM LLE (degrees)  LLE AROM : WFL  AROM RUE (degrees)  RUE General AROM: Co-eval with OT, see OT note for UE detail. AROM LUE (degrees)  LUE General AROM: Co-eval with OT, see OT note for UE detail. Strength RLE  Strength RLE: WFL  Comment: Grossly 4-/5  Strength LLE  Strength LLE: WFL  Comment: Grossly 4-/5  Strength RUE  Comment: Co-eval with OT, see OT note for UE detail. Strength LUE  Comment: Co-eval with OT, see OT note for UE detail.            Bed mobility  Supine to Sit: Moderate assistance (1x assist for trunk progression.)  Sit to Supine:  (pt retired up to a chair at the conclusion of today's session.)  Scooting: Minimal assistance  Comment: HOB elevated ~30 degrees  Transfers  Sit to Stand: Moderate Assistance  Stand to sit: Moderate Assistance  Comment: Verbal cues for hand placement. Ambulation  Surface: level tile  Device: Rolling Walker  Assistance: Moderate assistance  Quality of Gait: unsteady, decreased gait speed, decreased stride length, increased ROMAIN. Gait Deviations: Slow Diana; Increased ROMAIN; Decreased step length  Distance: 6 feet  Comments: Increased time for motor planning. More Ambulation?: No  Stairs/Curb  Stairs?: No     Balance  Sitting - Static: Fair;+  Sitting - Dynamic: Fair  Standing - Static: Fair;-  Standing - Dynamic: Fair;-  Comments: standing balance assessed while using a RW                                                    AM-PAC Score  AM-PAC Inpatient Mobility Raw Score : 12 (04/25/22 1432)  AM-PAC Inpatient T-Scale Score : 35.33 (04/25/22 1432)  Mobility Inpatient CMS 0-100% Score: 68.66 (04/25/22 1432)  Mobility Inpatient CMS G-Code Modifier : CL (04/25/22 1432)          Goals  Short Term Goals  Time Frame for Short term goals: 14 visits  Short term goal 1: Pt will ambulate 175 feet with least restrictive device and CGA to increase functional independence. Short term goal 2: Pt will negotiate 5 stairs with bilateral handrails and CGA to allow the pt to enter prior living arrangements. Short term goal 3: Pt will demonstrate good- standing balance to decrease fall risk. Short term goal 4: Pt will tolerate a 35 minute therapy session to promote increased endurance. Short term goal 5: Pt will perform sit<>stand transfer with SBA to increase functional independence.   Additional Goals?: No       Therapy Time   Individual Concurrent Group Co-treatment   Time In 4960         Time Out 0953         Minutes 35         Timed Code Treatment Minutes: 8 Minutes       Gregorio Frank, PT

## 2022-04-25 NOTE — PROGRESS NOTES
Resident updated that patient did not sleep overnight and is becoming increasingly restless and confused.  precedex gtt titrated and pain treated with PRN pain meds

## 2022-04-25 NOTE — PROGRESS NOTES
ICU PROGRESS NOTE    PATIENT NAME: Yanira Navarro  MEDICAL RECORD NO. [id number]  DATE: 4/25/2022    PRIMARY CARE PHYSICIAN: Jean Pierre Tao MD    HD: # 6    ASSESSMENT    Patient Active Problem List   Diagnosis    Insomnia    Moderate persistent asthma with acute exacerbation    Mixed hyperlipidemia    Adverse drug reaction    Liver disease, chronic, due to alcohol ([facility] [address]    COPD (chronic obstructive pulmonary disease) with chronic bronchitis (HCC)    Obstructive sleep apnea    Paroxysmal SVT (supraventricular tachycardia) (HCC)    Mild persistent asthma without complication    Dysphagia    History of cervical fracture    Closed fracture of cervical vertebra (HCC)    Cervical radiculopathy    Hypokalemia    Fall    Hypophosphatemia    Acute respiratory failure ([facility] [address]    Blood alcohol level of 240 mg/100 ml or more    Anemia    Cervical spine fracture [facility])       MEDICAL DECISION MAKING AND PLAN    Neuro:  - OPI 38, following commands, conversing appropriately  - Pain/sedation: Precedex, valium 10mg q6, MMT, fentanyl 50 q2 prn, markie 5mg q6 prn, seroquel 50mg q6, wean precedex today  - Daily ETOH use  - Thiamine and folic acid   - CTH neg for acute injuries  - CT C spine showing C6-7 fx, MRI C spine showing epidural hemorrhage at C6-7 amd prevertebral soft tissue swelling         - MRA neck showing no significant stenosis        - Intubated due to stridor, DL showing some airway edema        - POD#6 ORIF C6-7 by neurosurgery        - SBP <140 per nsg     CV  - UQ 39-37  - JTW [id number], no pressor requirements  - LA 0.80 (0.74) on 4/24  - Hydralazine 10mg q6 prn for SBP goal <140 per nsg  - Troponin 21, EKG stable on admission  - BNP pending today  - No significant cardiac hx on chart     Pulm  - Extubated 4/24  - SpO2 93-98% on 2L NC  - CXR showing pulmonary edema, BNP pending   - Lasix 40mg x1 this am, monitor for response  - No hx smoking or home O2 requirements     GI/Nutrition  - Diet tube feeds goal 65 ml/hr, ok for po intake if passed swallow screen  - Will titrate tf off if able to have general diet  - Ppx: protonix, glycolax  - CT AP showing no acute traumatic injuries, diverticulosis with jejunal intussusception, small fat containing umbilical hernia     Renal/lytes  - BUN/Cr 6/0.35  - Na/K 137/3.2, K replaced  - Mg/P 1.9/2.9  - Total fluid cap 100 ml/hr, LR maintenance  - I/Os 2596/2160 in last 24h, 0.7 cc/kg/hr  - 13L pos, lasix 40mg given this am, monitor response     Heme  - Hb 9.7 (9.1)  - Plt 196 (164)  - DVT ppx lovenox     7.   Endocrine        - -162        - Continue to monitor, no known hx DM     Micro  - Tmax 36.7  - WBC  5.1 (4.6)  - Monitor off antimicrobials     Family/dispo  - Wean precedex  - PT/OT  - Ok for stepdown if able to wean off precedex     Lines  - PIV  - Smyth      CHECKLIST    CAM-ICU RASS: 0  RESTRAINTS: n/a  IVF: LR  NUTRITION: tube feeds, general diet pending swallow screen  ANTIBIOTICS: n/a  GI: protonix  DVT: lovenox  GLYCEMIC CONTROL: n/a  HOB >45: n/a  MOBILITY: up with assistance  SBT: n/a  IS: will attempt    [address] did well overnight post extubation. Patient did require increase in precedex overnight for agitation. Plan for bedside swallow screen and to advance diet if tolerates.     OBJECTIVE  VITALS: Temp: Temp: 97.9 °F (36.6 °C)Temp  Av.9 °F (36.6 °C)  Min: 97.9 °F (36.6 °C)  Max: 98.1 °F (63.7 °C) BP Systolic (51NVO), TNE:305 , Min:92 , WLZ:808   Diastolic (53NWR), MWK:34, Min:51, Max:132   Pulse Pulse  Av.4  Min: 56  Max: 113 Resp Resp  Av  Min: 10  Max: 18 Pulse ox SpO2  Av.2 %  Min: 90 %  Max: 100 %    CONSTITUTIONAL: GCS 15, conversing appropriately  HEENT: PERRLA  LUNGS: clear bilaterally  CV: HRRR  GI: abdomen soft  MUSCULOSKELETAL: intact  NEUROLOGIC: grossly intact  SKIN: intact    LAB:  CBC:   Recent Labs     22  0454 22  0332 22  0500   WBC 5.6 4.6 5.1   HGB 10.3* 9.1* 9.7* HCT 32.1* 27.6* 30.0*   MCV 96.1 94.8 93.5   * 164 196     BMP:   Recent Labs     04/23/22  0454 04/24/22  0332 04/25/22  0500    138 137   K 3.1* 3.3* 3.2*    100 99   CO2 31 30 25   BUN 10 11 6*   CREATININE 0.36* 0.32* 0.35*   GLUCOSE 155* 162* 116*     RADIOLOGY:  CXR: 04/25/22   Impression   Subsegmental atelectasis right lung base with elevation of the hemidiaphragm.      Marely Reddy MD  4/25/22, 6:29 AM

## 2022-04-26 NOTE — PROGRESS NOTES
Occupational [address] [employer]  Occupational Therapy Not Seen Note    DATE: 2022    NAME: Víctor Kaiser  MRN: [id number]   : 1955      Patient not seen this date for Occupational Therapy due to:    Patient is not appropriate for active participation in OT evaluation/treatment at this time d/t not following commands, decreased creased cognition, hold therapy this day per RN    Next Scheduled Treatment: Attempt   Electronically signed by KAYLAH Pisano on 2022 at 9:26 AM

## 2022-04-26 NOTE — PROGRESS NOTES
Comprehensive Nutrition Assessment    Type and Reason for Visit:  Reassess    Nutrition Recommendations/Plan:   Continue Immune Enhancing TF until able to safely take oral diet. Advance as tolerated to goal of 65 mL/hr. This will provide 2340 kcal and 146 g pro/day. Will continue to follow. Malnutrition Assessment:  Malnutrition Status: At risk for malnutrition   Context:  Acute Illness     Findings of the 6 clinical characteristics of malnutrition:  Energy Intake:  Mild decrease in energy intake  Weight Loss:  Unable to assess     Body Fat Loss:  No significant body fat loss     Muscle Mass Loss:  No significant muscle mass loss    Fluid Accumulation:  No significant fluid accumulation     Strength:  Not Performed    Nutrition Assessment:    Chart reviewed. Pt was extubated on 4/24/22. S/p swallow study yesterday; ST recommmends NPO. NG placed and TF resumed; Immune Enhancing formula currently at 45 mL/hr with goal rate of 65 mL/hr ordered. Meds/labs reviewed. Nutrition Related Findings:    Last BM noted: 4/25/22 Wound Type: Surgical Incision (neck)       Current Nutrition Intake & Therapies:    Average Meal Intake: NPO  Average Supplements Intake: NPO  ADULT TUBE FEEDING; Nasogastric; Immune Enhancing; Continuous; 25; Yes; 5; Q 6 hours; 65; 30; Before and after each bolus  Current Tube Feeding (TF) Orders:  · Feeding Route: Nasogastric  · Formula: Immune Enhancing  · Schedule: Continuous  · Feeding Regimen: currently at 45 mL/hr with goal of 65 mL/hr ordered  · Current TF & Flush Orders Provides: 45 mL/hr =1620 kcal and 101 g pro/day  · Goal TF & Flush Orders Provides: 65 mL/hr will provide 2340 kcal and 146 g pro/day    Additional Calorie Sources:  · none      Anthropometric Measures:  Height: 6' 2\" (188 cm)  Ideal Body Weight (IBW): 190 lbs (86 kg)       Current Body Weight: 260 lb (117.9 kg), 136.8 % IBW.  Weight Source: Bed Scale  Current BMI (kg/m2): 33.4                          BMI Categories: Obese Class 1 (BMI 30.0-34. 9)    Estimated Daily Nutrient Needs:  Energy Requirements Based On: Formula  Weight Used for Energy Requirements: Current  Energy (kcal/day): 2400 kcal/day  Weight Used for Protein Requirements: Ideal  Protein (g/day): 130 g pro/day  Method Used for Fluid Requirements: Other (Comment)  Fluid (ml/day): per MD    Nutrition Diagnosis:   · Inadequate oral intake related to swallowing difficulty as evidenced by NPO or clear liquid status due to medical condition,nutrition support - enteral nutrition      Nutrition Interventions:   Food and/or Nutrient Delivery: Continue Immune Enhancing TF until able to safely take oral diet. Advance as tolerated to goal of 65 mL/hr. This will provide 2340 kcal and 146 g pro/day. Nutrition Education/Counseling: No recommendation at this time  Coordination of Nutrition Care: Continue to monitor while inpatient       Goals:  Previous Goal Met: Progressing toward Goal(s)  Goals: Meet at least 75% of estimated needs,by next RD assessment       Nutrition Monitoring and Evaluation:   Behavioral-Environmental Outcomes: None Identified  Food/Nutrient Intake Outcomes: Enteral Nutrition Intake/Tolerance  Physical Signs/Symptoms Outcomes: Biochemical Data,Nutrition Focused Physical Findings,Skin,Weight,Chewing or Swallowing    Discharge Planning:     Too soon to determine     [address], LD  Contact: [phone]

## 2022-04-26 NOTE — PROGRESS NOTES
[address]  Speech Language Pathology    Date: 4/26/2022  Patient Name: Stefano Hook  YOB: 1955   AGE: 75 y.o. MRN: [id number]        Patient Not Available for Speech Therapy     Due to:  [] Testing  [] Hemodialysis  [] Cancelled by RN  [] Surgery   [] Intubation/Sedation/Pain Medication  [] Medical instability  [x] Other: Pt. Unable to follow directions needed to complete OMEX program despite max verbal cues    Next scheduled treatment: 4/27    Completed by: Kristie Zayas, SLP, M.A.  CCC-SLP

## 2022-04-26 NOTE — PLAN OF CARE
Problem: Confusion - Acute:  Goal: Absence of continued neurological deterioration signs and symptoms  Description: Absence of continued neurological deterioration signs and symptoms  Outcome: Progressing  Goal: Mental status will be restored to baseline  Description: Mental status will be restored to baseline  Outcome: Progressing     Problem: Discharge Planning:  Goal: Ability to perform activities of daily living will improve  Description: Ability to perform activities of daily living will improve  Outcome: Progressing  Goal: Participates in care planning  Description: Participates in care planning  Outcome: Progressing     Problem: Injury - Risk of, Physical Injury:  Goal: Absence of physical injury  Description: Absence of physical injury  Outcome: Progressing  Goal: Will remain free from falls  Description: Will remain free from falls  Outcome: Progressing     Problem: Mood - Altered:  Goal: Mood stable  Description: Mood stable  Outcome: Progressing  Goal: Absence of abusive behavior  Description: Absence of abusive behavior  Outcome: Progressing  Goal: Verbalizations of feeling emotionally comfortable while being cared for will increase  Description: Verbalizations of feeling emotionally comfortable while being cared for will increase  Outcome: Progressing     Problem: Psychomotor Activity - Altered:  Goal: Absence of psychomotor disturbance signs and symptoms  Description: Absence of psychomotor disturbance signs and symptoms  Outcome: Progressing     Problem: Sensory Perception - Impaired:  Goal: Demonstrations of improved sensory functioning will increase  Description: Demonstrations of improved sensory functioning will increase  Outcome: Progressing  Goal: Decrease in sensory misperception frequency  Description: Decrease in sensory misperception frequency  Outcome: Progressing  Goal: Able to refrain from responding to false sensory perceptions  Description: Able to refrain from responding to false sensory perceptions  Outcome: Progressing  Goal: Demonstrates accurate environmental perceptions  Description: Demonstrates accurate environmental perceptions  Outcome: Progressing  Goal: Able to distinguish between reality-based and nonreality-based thinking  Description: Able to distinguish between reality-based and nonreality-based thinking  Outcome: Progressing  Goal: Able to interrupt nonreality-based thinking  Description: Able to interrupt nonreality-based thinking  Outcome: Progressing     Problem: Sleep Pattern Disturbance:  Goal: Appears well-rested  Description: Appears well-rested  Outcome: Progressing     Problem: Skin Integrity:  Goal: Will show no infection signs and symptoms  Description: Will show no infection signs and symptoms  Outcome: Progressing  Goal: Absence of new skin breakdown  Description: Absence of new skin breakdown  Outcome: Progressing     Problem: Falls - Risk of:  Goal: Absence of physical injury  Description: Absence of physical injury  Outcome: Progressing  Goal: Will remain free from falls  Description: Will remain free from falls  Outcome: Progressing     Problem: Discharge Planning  Goal: Discharge to home or other facility with appropriate resources  Outcome: Progressing     Problem: Pain  Goal: Verbalizes/displays adequate comfort level or baseline comfort level  Outcome: Progressing     Problem: Nutrition Deficit:  Goal: Optimize nutritional status  Outcome: Progressing     Problem: ABCDS Injury Assessment  Goal: Absence of physical injury  Outcome: Progressing

## 2022-04-26 NOTE — PROGRESS NOTES
ICU PROGRESS NOTE    PATIENT NAME: Holden Maier  MEDICAL RECORD NO. [id number]  DATE: 4/26/2022    PRIMARY CARE PHYSICIAN: Aletha Burrell MD    HD: # 7    ASSESSMENT    Patient Active Problem List   Diagnosis    Insomnia    Moderate persistent asthma with acute exacerbation    Mixed hyperlipidemia    Adverse drug reaction    Liver disease, chronic, due to alcohol ([facility] [address]    COPD (chronic obstructive pulmonary disease) with chronic bronchitis (HCC)    Obstructive sleep apnea    Paroxysmal SVT (supraventricular tachycardia) (HCC)    Mild persistent asthma without complication    Dysphagia    History of cervical fracture    Closed fracture of cervical vertebra (HCC)    Cervical radiculopathy    Hypokalemia    Fall    Hypophosphatemia    Acute respiratory failure ([facility] [address]    Blood alcohol level of 240 mg/100 ml or more    Anemia    Cervical spine fracture [facility])       MEDICAL DECISION MAKING AND PLAN    Neuro:  - GCT 80, following commands, conversing appropriately  - Pain/sedation: Precedex, valium 5mg q6, MMT, fentanyl 50 q2 prn, markie 5mg q6 prn, seroquel 100mg nightly, wean precedex today  - Daily ETOH use  - Thiamine and folic acid   - CTH neg for acute injuries  - CT C spine showing C6-7 fx, MRI C spine showing epidural hemorrhage at C6-7 amd prevertebral soft tissue swelling         - MRA neck showing no significant stenosis        - Intubated due to stridor, DL showing some airway edema        - POD#7 ORIF C6-7 by neurosurgery        - SBP <140 per nsg     CV  - TF   - MAP , no pressor requirements  - Hydralazine 10mg q6 prn for SBP goal <140 per nsg  - Troponin 21, EKG stable on admission  -   - No significant cardiac hx on chart     Pulm  - Extubated 4/24  - SpO2 93-98% on 2L NC, CPAP or HFNC overnight   - CXR showing pulmonary edema        - Lasix given with good response, will attempt again today  - No hx smoking or home O2 requirements     GI/Nutrition  - Diet tube feeds goal 65 ml/hr, currently npo as patient pulled out NGT  - Failed formal swallow study  - IR to place NGT today  - Ppx: protonix, glycolax  - CT AP showing no acute traumatic injuries, diverticulosis with jejunal intussusception, small fat containing umbilical hernia     Renal/lytes  - BUN/Cr 5/0.42  - Na/K 142/3.3, K replaced  - Mg/P 2.7/4.0  - Total fluid cap 75 ml/hr, LR maintenance  - I/Os 1801/5330 in last 24h, 1.8 cc/kg/hr  - 10L pos, lasix 40mg given with good response     Heme  - Hb 11.2 (11.3)  - Plt 246 (276)  - DVT ppx lovenox     7.   Endocrine        - -144        - Continue to monitor, no known hx DM     Micro  - Tmax 37.5  - WBC  6.5 (7.7)  - Monitor off antimicrobials     Family/dispo  - Wean precedex  - PT/OT  - Ok for stepdown if able to wean off precedex     Lines  - PIV  - Smyth      CHECKLIST    CAM-ICU RASS: 0  RESTRAINTS: b/l soft wrists  IVF: LR  NUTRITION: tube feeds  ANTIBIOTICS: n/a  GI: pepcid  DVT: lovenox  GLYCEMIC CONTROL: n/a  HOB >45: n/a  MOBILITY: up with assistance  SBT: n/a  IS: will attempt    Toñito Yoo was weaned off the precedex yesterday afternoon, however overnight got tachycardic and hypertension with acute agitation. Received ativan and was restarted on precedex, with some hypotension that is currently resolving. Did not tolerate CPAP overnight, was on HFNC.     OBJECTIVE  VITALS: Temp: Temp: 99.5 °F (37.5 °C)Temp  Av °F (37.2 °C)  Min: 98.1 °F (36.7 °C)  Max: 99.9 °F (26.9 °C) BP Systolic (06NJL), HJC:991 , Min:90 , OMW:943   Diastolic (11VRM), RFU:77, Min:55, Max:117   Pulse Pulse  Av.1  Min: 66  Max: 156 Resp Resp  Av.8  Min: 12  Max: 25 Pulse ox SpO2  Av.8 %  Min: 92 %  Max: 100 %    CONSTITUTIONAL: GCS 15, conversing appropriately, following commands  HEENT: PERRLA  LUNGS: clear bilaterally  CV: HRRR  GI: abdomen soft and non tender  MUSCULOSKELETAL: intact  NEUROLOGIC: GCS 15, following commands, moving all 4 extremities spontaneously  SKIN: intact    LAB:  CBC:   Recent Labs     04/25/22  0500 04/25/22  2043 04/26/22  0307   WBC 5.1 7.7 6.5   HGB 9.7* 11.3* 11.2*   HCT 30.0* 35.7* 34.2*   MCV 93.5 94.4 94.0    276 246     BMP:   Recent Labs     04/25/22  0500 04/25/22 2043 04/26/22  0307    139 142   K 3.2* 3.1* 3.3*   CL 99 95* 100   CO2 25 23 22   BUN 6* 5* 5*   CREATININE 0.35* 0.46* 0.42*   GLUCOSE 116* 144* 135*     RADIOLOGY:  CXR: 04/26/22   Impression   Mild bilateral congestion.        Noah Plunkett MD  4/26/22, 8:29 AM

## 2022-04-26 NOTE — PROGRESS NOTES
Spoke with ALEXEY Cha CM, who states pt is requesting [facility]. Writer notified Starla per Dr Jonathan Medina pt is approp for ARU if he has 24/7 support. Will need to be off Precedex and have a nutrition plan. Starla states pt is to have rpt MBSS in 1 week. ARU will follow.

## 2022-04-26 NOTE — CARE COORDINATION
Placed call to Shaylee at [facility], discussed feeding plan, and per Adore Champion, family support needs to be clarified and pt will have to be off precedex before he would be able to admit.

## 2022-04-26 NOTE — PLAN OF CARE
Problem: Non-Violent Restraints  Goal: Able to cope with pain  Description: Able to cope with pain  Outcome: Not Progressing     Problem: Confusion - Acute:  Goal: Absence of continued neurological deterioration signs and symptoms  Description: Absence of continued neurological deterioration signs and symptoms  4/26/2022 1130 by Rach London RN  Outcome: Not Progressing  4/26/2022 0329 by Jack Mosher RN  Outcome: Progressing  Goal: Mental status will be restored to baseline  Description: Mental status will be restored to baseline  4/26/2022 1130 by Rach London RN  Outcome: Not Progressing  4/26/2022 0329 by Jack Mosher RN  Outcome: Progressing     Problem: Mood - Altered:  Goal: Mood stable  Description: Mood stable  4/26/2022 1130 by Rach London RN  Outcome: Not Progressing  4/26/2022 0329 by Jack Mosher RN  Outcome: Progressing     Problem: Psychomotor Activity - Altered:  Goal: Absence of psychomotor disturbance signs and symptoms  Description: Absence of psychomotor disturbance signs and symptoms  4/26/2022 1130 by Rach London RN  Outcome: Not Progressing  4/26/2022 0329 by Jack Mosher RN  Outcome: Progressing     Problem: Sensory Perception - Impaired:  Goal: Demonstrations of improved sensory functioning will increase  Description: Demonstrations of improved sensory functioning will increase  4/26/2022 1130 by Rach London RN  Outcome: Not Progressing  4/26/2022 0329 by Jack Mosher RN  Outcome: Progressing  Goal: Decrease in sensory misperception frequency  Description: Decrease in sensory misperception frequency  4/26/2022 1130 by Rach London RN  Outcome: Not Progressing  4/26/2022 0329 by Jack Mosher RN  Outcome: Progressing  Goal: Able to refrain from responding to false sensory perceptions  Description: Able to refrain from responding to false sensory perceptions  4/26/2022 1130 by Rach London RN  Outcome: Not Progressing  4/26/2022 0329 by Thong Villeda Lucia Balbuena RN  Outcome: Progressing  Goal: Demonstrates accurate environmental perceptions  Description: Demonstrates accurate environmental perceptions  4/26/2022 1130 by Roxanna Roberson RN  Outcome: Not Progressing  4/26/2022 0329 by Azam Fernandes RN  Outcome: Progressing  Goal: Able to distinguish between reality-based and nonreality-based thinking  Description: Able to distinguish between reality-based and nonreality-based thinking  4/26/2022 1130 by Roxanna Roberson RN  Outcome: Not Progressing  4/26/2022 0329 by Azam Fernandes RN  Outcome: Progressing  Goal: Able to interrupt nonreality-based thinking  Description: Able to interrupt nonreality-based thinking  4/26/2022 1130 by Roxanna Roberson RN  Outcome: Not Progressing  4/26/2022 0329 by Azam Fernandes RN  Outcome: Progressing     Problem: Discharge Planning  Goal: Discharge to home or other facility with appropriate resources  4/26/2022 1130 by Roxanna Roberson RN  Outcome: Not Progressing  4/26/2022 0329 by Azam Fernandes RN  Outcome: Progressing     Problem: Pain  Goal: Verbalizes/displays adequate comfort level or baseline comfort level  4/26/2022 1130 by Roxanna Roberson RN  Outcome: Not Progressing  4/26/2022 0329 by Azam Fernandes RN  Outcome: Progressing     Problem: ABCDS Injury Assessment  Goal: Absence of physical injury  4/26/2022 1130 by Roxanna Roberson RN  Outcome: Not Progressing  4/26/2022 0329 by Azam Fernandes RN  Outcome: Progressing

## 2022-04-26 NOTE — PROGRESS NOTES
Dr. Heladio Ling anticipates pt will be ready for discharge in approximately 48 hours to ARU. He will need to dc with NG and tube feeds. Spoke with , Speech Therapy who report pt should be re-evaluated in 5-7 days with Modified Barium Swallow Study.

## 2022-04-26 NOTE — CONSULTS
Physical Medicine & Rehabilitation  Consult Note      Admitting Physician: Adamaris Morfin,*    Primary Care Provider: Ching Loredo MD     Reason for Consult:  Acute Inpatient Rehabilitation    Chief Complaint: Fall    History of Present Illness:  Referring Provider is requesting an evaluation for appropriate placement upon discharge from acute care. Mr. Javier Cerda is a 75 y.o.  male who was admitted to [facility] on 4/19/2022 with Fall (transfer from [place] )    68-year-old male status post fall downstairs with neck pain noted right hand numbness has been drinking daily found to have C6 fracture, ethanol 241    Neurosurgery- C6-7 disc disruption, C7 radiculopathy underwent C7 corpectomy and ACDF C6-7 on 4/19,  collar at all times except for hygiene    Trauma- Daily alcohol use on thiamine and folic acid intubated due to stridor-extubated 4/24 chest x-ray showing pulmonary edema given Lasix on tube feeds awaiting swallow study, CT showed diverticulosis with jejunal intussusception small fat-containing umbilical hernia weaning Precedex    Radiology:  XR CERVICAL SPINE (2-3 VIEWS)    Result Date: 4/25/2022  Anterior cervical disc fusion C6 through T1. Torticollis. Detail/sensitivity obscured due to positioning and collar. MRI CERVICAL SPINE WO CONTRAST    Result Date: 4/19/2022  1. Acute traumatic injury at the C6-C7 level with right-sided facet fractures, torn ligamentum flavum and posterior longitudinal ligament, and possibly torn anterior longitudinal ligament versus osteophyte fracture. No significant spondylolisthesis. 2. Epidural hemorrhage centered the C6-C7 level results in severe thecal sac stenosis. No convincing abnormal spinal cord signal. 3. Cervicothoracic prevertebral soft tissue edema/fluid present, measuring up to 0.6 cm in thickness. 4. Posterior paraspinal soft tissue edema present in the cervical levels, including in the interspinous spaces.  Preliminary findings discussed with ordering clinician Dr. Francesco Hayden on 04/19/2022 at 3:26 p.m. XR SHOULDER LEFT (MIN 2 VIEWS)    Result Date: 4/24/2022  1. No acute osseous abnormality. 2. Mild AC joint osteoarthritis. 3. Follow-up imaging recommended if pain persists or worsens following conservative management. XR CHEST PORTABLE    Result Date: 4/26/2022  Mild bilateral congestion. XR CHEST PORTABLE    Result Date: 4/25/2022  Subsegmental atelectasis right lung base with elevation of the hemidiaphragm. XR CHEST PORTABLE    Result Date: 4/24/2022  Low lung volumes with associated bronchovascular crowding, mild diffuse pulmonary edema and continued right basilar atelectasis with or without superimposed consolidation. Correlate with concerns for underlying mucous plugging. XR CHEST PORTABLE    Result Date: 4/22/2022  High-riding ETT persist.  Exact tube position difficult to assess due to overlying hardware. Elevated right hemidiaphragm with basilar atelectasis. XR CHEST PORTABLE    Result Date: 4/21/2022  The ET tube was 8.5 cm above the lisa. The NG tube was past the GE junction. Unchanged discoid atelectasis was noted in the right middle lobe. No cardiomegaly, interstitial edema or pneumothorax. XR CHEST PORTABLE    Result Date: 4/20/2022  1. The endotracheal tube tip is located 9.7 cm above the lisa. This could be advanced approximately 3-4 cm for more optimal placement. 2. Worsening atelectasis in the right lung base. 3. The orogastric tube tip now terminates below the diaphragm, incompletely imaged. XR ABDOMEN FOR NG/OG/NE TUBE PLACEMENT    Result Date: 4/26/2022  Nasogastric tip is in the stomach     XR ABDOMEN FOR NG/OG/NE TUBE PLACEMENT    Result Date: 4/25/2022  Enteric tube and proximal side port terminate within the stomach. XR ABDOMEN FOR NG/OG/NE TUBE PLACEMENT    Result Date: 4/21/2022  1. The NG tube side port is noted at the GE junction.   Recommend advancement of the tube 5 cm for more optimal positioning. XR ABDOMEN FOR NG/OG/NE TUBE PLACEMENT    Result Date: 4/19/2022  Enteric tube in expected position. MRA NECK WO CONTRAST    Result Date: 4/19/2022  Within limitations of motion degradation, the major arteries of the neck appear grossly patent without significant stenosis. If clinical concerns persist, can consider correlation with contrast enhanced CT neck. FL MODIFIED BARIUM SWALLOW W VIDEO    Result Date: 4/25/2022  Aspiration of thin liquid. Laryngeal penetration of nectar thick liquid and puree. Other consistencies not tested. Please see separate speech pathology report for full discussion of findings and recommendations. Review of Systems:  Constitutional: negative for anorexia, chills, fatigue, fevers, sweats and weight loss  Eyes: negative for redness and visual disturbance  Ears, nose, mouth, throat, and face: negative for earaches, sore throat and tinnitus  Respiratory: negative for cough and shortness of breath  Cardiovascular: negative for chest pain, dyspnea and palpitations  Gastrointestinal: negative for abdominal pain, change in bowel habits, constipation, nausea and vomiting  Genitourinary:negative for dysuria, frequency, hesitancy and urinary incontinence  Integument/breast: negative for pruritus and rash  Musculoskeletal:negative for muscle weakness and stiff joints  Neurological: negative for dizziness, headaches and weakness  Behavioral/Psych: negative for decreased appetite, depression and fatigue    Functional History:  PTA: Independent with all activities. Current:  PT:  Restrictions/Precautions: Seizure,Fall Risk  Other position/activity restrictions: SBP<140, s/p ACDF C6-7 04/19/2022  Required Braces or Orthoses  Cervical: c-collar (Aspen cervical collar at all times except during hygiene)   Transfers  Sit to Stand: Moderate Assistance  Stand to sit: Moderate Assistance  Comment: Verbal cues for hand placement.   Ambulation  Surface: level tile  Device: Rolling Walker  Assistance: Moderate assistance  Quality of Gait: unsteady, decreased gait speed, decreased stride length, increased ROMAIN. Gait Deviations: Slow Diana,Increased ROMAIN,Decreased step length  Distance: 6 feet  Comments: Increased time for motor planning. More Ambulation?: No           OT:   ADL  Feeding: Modified independent ;Setup  Grooming: Modified independent ;Setup  UE Bathing: Minimal assistance;Setup; Increased time to complete;Verbal cueing  LE Bathing: Setup;Verbal cueing; Increased time to complete; Moderate assistance  UE Dressing: Minimal assistance;Setup;Verbal cueing; Increased time to complete  LE Dressing: Setup;Verbal cueing; Increased time to complete; Moderate assistance  Toileting: Moderate assistance;Setup; Increased time to complete; Adaptive equipment      ST:    4/25  Recommend pt remain NPO with alternative means of nutrition. Pt with +mod-max pharyngeal residue following puree, min-mod vallecula residue following nectar and thin. +penetration with puree and nectar, +penetration and aspiration with thin. +coughing following all consistencies, +expectoration of phlegm/bolus. Note no epiglottic inversion with all consistencies tested.         Past Medical History:        Diagnosis Date    Asthma     Calculus of gallbladder without mention of cholecystitis or obstruction     Chronic back pain     Hyperlipidemia     Insomnia, unspecified     Spinal stenosis, unspecified region other than cervical     Urinary incontinence        Past Surgical History:        Procedure Laterality Date    CERVICAL FUSION N/A 4/19/2022    C7 CORPECTOMY performed by Rahul Arnold DO at [facility] COLONOSCOPY FLX DX W/COLLJ SPEC WHEN PFRMD N/A 8/29/2018    COLONOSCOPY performed by Vamsi Ruggiero MD at [facility]  8/29/2018    EGD BIOPSY performed by Vamsi Ruggiero MD at [address] [place] [address] Place: Allergies   Allergen Reactions    Seasonal Other (See Comments)     Sneezing    Cat Hair Extract Rash        Current Medications:   Current Facility-Administered Medications: potassium bicarb-citric acid (EFFER-K) effervescent tablet 40 mEq, 40 mEq, Oral, Once  magnesium sulfate 2000 mg in 50 mL IVPB premix, 2,000 mg, IntraVENous, Once  melatonin tablet 5 mg, 5 mg, Oral, Nightly PRN  methocarbamol (ROBAXIN) tablet 750 mg, 750 mg, Oral, Q6H  gabapentin (NEURONTIN) capsule 300 mg, 300 mg, Oral, Q8H  ibuprofen (ADVIL;MOTRIN) tablet 400 mg, 400 mg, Oral, Q4H  diazePAM (VALIUM) tablet 5 mg, 5 mg, Oral, Q6H PRN  diazePAM (VALIUM) tablet 5 mg, 5 mg, Oral, Q6H  traZODone (DESYREL) tablet 100 mg, 100 mg, Oral, Nightly  QUEtiapine (SEROQUEL) tablet 100 mg, 100 mg, Oral, Nightly  albuterol sulfate  (90 Base) MCG/ACT inhaler 2 puff, 2 puff, Inhalation, Q4H PRN  famotidine (PEPCID) tablet 20 mg, 20 mg, Oral, BID  benzocaine-menthol (DERMOPLAST) 20-0.5 % spray, , Topical, PRN  dexmedetomidine (PRECEDEX) 400 mcg in sodium chloride 0.9 % 100 mL infusion, 0.1-1.5 mcg/kg/hr, IntraVENous, Continuous  fentaNYL (SUBLIMAZE) injection 50 mcg, 50 mcg, IntraVENous, Q2H PRN  enoxaparin Sodium (LOVENOX) injection 30 mg, 30 mg, SubCUTAneous, BID  oxyCODONE (ROXICODONE) immediate release tablet 5 mg, 5 mg, Oral, Q6H PRN  acetaminophen (TYLENOL) tablet 1,000 mg, 1,000 mg, Oral, J1F  folic acid (FOLVITE) tablet 1 mg, 1 mg, Oral, Daily  polyethylene glycol (GLYCOLAX) packet 17 g, 17 g, Oral, Daily  phosphorus (K PHOS NEUTRAL) tablet 2 tablet, 500 mg, Oral, BID  ondansetron (ZOFRAN-ODT) disintegrating tablet 4 mg, 4 mg, Oral, Q8H PRN **OR** ondansetron (ZOFRAN) injection 4 mg, 4 mg, IntraVENous, Q6H PRN  lactated ringers infusion, , IntraVENous, Continuous  atorvastatin (LIPITOR) tablet 20 mg, 20 mg, Oral, Nightly  hydrALAZINE (APRESOLINE) injection 10 mg, 10 mg, IntraVENous, Q6H PRN    Social History:  Social History     Socioeconomic History    Marital status:      Spouse name: Not on file    Number of children: Not on file    Years of education: Not on file    Highest education level: Not on file   Occupational History    Not on file   Tobacco Use    Smoking status: Former Smoker     Packs/day: 1.00     Years: 24.00     Pack years: 24.00     Types: Cigarettes     Quit date:      Years since quittin.3    Smokeless tobacco: Never Used   Vaping Use    Vaping Use: Never used   Substance and Sexual Activity    Alcohol use: Yes     Comment: daily drinker (vodka)    Drug use: No    Sexual activity: Not on file   Other Topics Concern    Not on file   Social History Narrative    Not on file     Social Determinants of Health     Financial Resource Strain:     Difficulty of Paying Living Expenses: Not on file   Food Insecurity:     Worried About [address] in the Last Year: Not on file    [address] [employer] in the Last Year: Not on file   Transportation Needs:     Lack of Transportation (Medical): Not on file    Lack of Transportation (Non-Medical):  Not on file   Physical Activity:     Days of Exercise per Week: Not on file    Minutes of Exercise per Session: Not on file   Stress:     Feeling of Stress : Not on file   Social Connections:     Frequency of Communication with Friends and Family: Not on file    Frequency of Social Gatherings with Friends and Family: Not on file    Attends Jainism Services: Not on file    Active Member of Clubs or Organizations: Not on file    Attends Club or Organization Meetings: Not on file    Marital Status: Not on file   Intimate Partner Violence:     Fear of Current or Ex-Partner: Not on file    Emotionally Abused: Not on file    Physically Abused: Not on file    Sexually Abused: Not on file   Housing Stability:     Unable to Pay for Housing in the Last Year: Not on file    Number of [place] in the Last Year: Not on file    Unstable Housing in the Last Year: Not on file     Social/Functional History  Lives With: Alone  Type of Home: House  Home Layout: Two level,Bed/Bath upstairs  Home Access: Stairs to enter with rails  Entrance Stairs - Number of Steps: 5  Entrance Stairs - Rails: Both  Bathroom Shower/Tub: Tub/Shower unit  Bathroom Toilet: Handicap height  Bathroom Equipment: Grab bars in shower  Home Equipment:  (pt reports not owning or using any DME at a baseline.)  Has the patient had two or more falls in the past year or any fall with injury in the past year?: Yes  ADL Assistance: [address]: Independent  Homemaking Responsibilities: Yes  Ambulation Assistance: Independent  Transfer Assistance: Independent  Active : Yes  Mode of Transportation: [facility]  Occupation: Retired  Leisure & Hobbies: golfing, spending time with grandkids  Additional Comments: Pt reports daughters work but would be able to assist when not at work. Family History:   History reviewed. No pertinent family history. Physical Exam:    BP (!) 146/91   Pulse 87   Temp 99.5 °F (37.5 °C) (Oral)   Resp 20   Ht 6' 2\" (1.88 m)   Wt 260 lb 12.9 oz (118.3 kg)   SpO2 94%   BMI 33.49 kg/m²     General appearance: alert, appears stated age, cooperative, and no distress  HEENT: Normocephalic, without obvious abnormality, atraumatic Aspen collar in place  Pulm: clear to auscultation bilaterally. Cardiac: regular rate and rhythm, no murmur. Abdomen: soft, non-tender; bowel sounds normal.  MSK: extremities normal, atraumatic, no edema, normal tone. ROM: Functional range of motion lower extremities distally, upper extremities in restraints  Mental status/Psych: Alert, ?orientation,  Skin:     Neuro:    l.     Sensory: Intact in BUE and BLE to soft and pin sensation. Motor: Muscle tone and bulk are normal bilaterally. No pronator drift. At least antigravity upper lower extremities, limited due to restraint    .       Diagnostics:  CBC   Lab Results   Component Value Date    WBC 6.5 04/26/2022    RBC 3.64 04/26/2022    RBC 4.82 02/09/2012    HGB 11.2 04/26/2022    HCT 34.2 04/26/2022    MCV 94.0 04/26/2022    RDW 21.6 04/26/2022     04/26/2022     02/09/2012     BMP    Lab Results   Component Value Date     04/26/2022    K 3.3 04/26/2022     04/26/2022    CO2 22 04/26/2022    BUN 5 04/26/2022     Uric Acid  No components found for: URIC  VITAMIN B12 No components found for: B12  PT/INR  No results found for: PTINR      Impression: Mr. Kim Nix is a 75 y.o. male with a history of History of cervical fracture    1. Fall with C6-7 disruption, C7 radiculopathy status post ACDF C6-7 and C7 corpectomy  2. Daily alcohol use  3. Extubated 4/24  4. Noted diverticulosis and jejunal intussusception and umbilical hernia on CT  5. Currently n.p.o.  6. Hypertension/hyperlipidemia-Lipitor  7. Chronic low back pain  8. Pain-Roxicodone, Motrin, Tylenol, fentanyl Neurontin, Robaxin  9. Agitation?/Delirium-Currently on Precedex, Seroquel Valium,and trazodone, melatonin, multiple attempts to pull NG tube last night and today, currently has restraints  10. Alcohol use-  11. Hypokalemia potassium    Recommendations:  1. Diagnosis: Fall with C7 radiculopathy status post ACDF C6-7 and C7 corpectomy  2. Therapy: Has PT OT and speech need  3. Medical  Necessity: As above  4. Support: Clarify-suspect will need at least supervision 24/7 due to risk of falls, drinking, cervical fracture/Yolo collar  5. Rehab recommendation: Suspect will benefit acute inpatient rehabilitation when medically ready, if has  support,    -Currently continues on Precedex, monitor participation  -Currently n.p.o., evaluate for diet    6. DVT proph: Lovenox    Discussed with nursing    It was my pleasure to evaluate Kim Nix today. Please call with questions. Sonia Gunn. Richard Ball MD          This note is created with the assistance of a speech recognition program.  While intending to generate a document that actually reflects the content of the visit, the document can still have some errors including those of syntax and sound a like substitutions which may escape proof reading.   In such instances, actual meaning can be extrapolated by contextual diversion

## 2022-04-27 NOTE — FLOWSHEET NOTE
SPIRITUAL CARE DEPARTMENT - [facility]  PROGRESS NOTE    Shift date: 4/27/22  Shift day: Wednesday   Shift # 2    Room # 2921/9915-08   Name: Art Griffiths            Age: 75 y.o. Gender: male          Moravian: None   Place of Yazidi: Unknown    Referral: Routine Visit    Admit Date & Time: 4/19/2022  7:31 AM    PATIENT/EVENT DESCRIPTION:  Art Griffiths is a 75 y.o. male in room 1009 of CAR-1. SPIRITUAL ASSESSMENT/INTERVENTION:  When entering room, patient appeared agitated.  met and conversed with the patient. Patient desired something that he couldn't find.  helped patient find his remote control.  provided ministry of presence. Patient declined prayer at this moment of time. SPIRITUAL CARE FOLLOW-UP PLAN:  Chaplains will remain available to offer spiritual and emotional support as needed. Electronically signed by Allison Long, on 4/27/2022 at 7:17 PM.  [facility]  [phone]       04/27/22 1914   Encounter Summary   Encounter Overview/Reason  Initial Encounter   Service Provided For: Patient   Referral/Consult From: [facility]   [facility]   Last Encounter  04/27/22   Begin Time 1910   End Time  1920   Total Time Calculated 10 min   Encounter    Type Initial Screen/Assessment   Assessment/Intervention/Outcome   Assessment Anxious; Coping;Loneliness; Powerlessness; Sad   Intervention Active listening;Sustaining Presence/Ministry of presence   Outcome Receptive  (Anxious)

## 2022-04-27 NOTE — PROGRESS NOTES
Physical Therapy        Physical Therapy Cancel Note      DATE: 2022    NAME: Kelby Parnell  MRN: [id number]   : 1955      Patient not seen this date for Physical Therapy due to:    Patient is not appropriate for PT evaluation/treatment at this time d/t Cx per RN, will resume PT 2022      Electronically signed by Alessio Sam PTA on 2022 at 11:43 AM

## 2022-04-27 NOTE — PROGRESS NOTES
Occupational Therapy  Facility/Department: [facility] [place] [facility]  Occupational Therapy Daily Treatment Note    Name: Claudia Landin  : 1955  MRN: [id number]  Date of Service: 2022    Discharge Recommendations:  Further therapy recommended at discharge. The patient should be able to tolerate at least 3 hours of therapy per day over 5 days or 15 hours over 7 days. This patient may benefit from a Physical Medicine and Rehab consult. Patient Diagnosis(es): The primary encounter diagnosis was Closed nondisplaced fracture of sixth cervical vertebra, unspecified fracture morphology, initial encounter ([facility] Diagnoses of Closed nondisplaced fracture of seventh cervical vertebra, unspecified fracture morphology, initial encounter ([facility] and Alcoholic ketoacidosis were also pertinent to this visit. Assessment   Performance deficits / Impairments: Decreased functional mobility ; Decreased ADL status; Decreased strength;Decreased safe awareness;Decreased cognition;Decreased endurance;Decreased balance;Decreased high-level IADLs  Prognosis: Good  REQUIRES OT FOLLOW-UP: Yes  Activity Tolerance  Activity Tolerance: Treatment limited secondary to decreased cognition;Treatment limited secondary to medical complications (free text)  Activity Tolerance: elevated HR        Plan   Plan  Times per Week: 4-5 x/wk  Current Treatment Recommendations: Strengthening,Balance training,Functional mobility training,Endurance training,Cognitive reorientation,Safety education & training,Patient/Caregiver education & training,Equipment evaluation, education, & procurement,Self-Care / ADL,Home management training,Cognitive/Perceptual training,Coordination training     Restrictions  Restrictions/Precautions  Restrictions/Precautions: Seizure,Fall Risk  Required Braces or Orthoses?: Yes  Required Braces or Orthoses  Cervical: c-collar (at all times except for hygiene)  Position Activity Restriction  Other position/activity restrictions: SBP<140, s/p ACDF C6-7 04/19/2022    Subjective   General  Chart Reviewed: Yes  Patient assessed for rehabilitation services?: Yes  Family / Caregiver Present: No  General Comment  Comments: Pt and RN agreeable to therapy this day. Pt seated in chair at start of session with  bpm. At session end pt retired to seated in chair with BLE elevated, call light in reach, chair alarm on, RN aware and  bpm. At start of session pt slumped in chair, WISE providing assist to correct and position patient safely        Safety Devices  Type of Devices: Patient at risk for falls; Left in chair;Call light within reach;Nurse notified; Chair alarm in place;Gait belt  Restraints  Restraints Initially in Place: No  Bed Mobility Training  Bed Mobility Training: No  Balance  Sitting: With support (seated in chair supported/unsupported tolerating approx 15 min at SBA)  Standing: With support (CGA at chair utilizing RW demo 0 LOB tolerating approx 2 min static standing)  Gait  Overall Level of Assistance:  (ERICH sec to elevated HR, pt HR increasing to 168 bpm with static standing)        ADL  Grooming: Setup;Stand by assistance  Grooming Skilled Clinical Factors: Face washing facilitated seated in chair  UE Dressing: Minimal assistance;Setup;Verbal cueing; Increased time to complete  UE Dressing Skilled Clinical Factors: to doff/don gown seated in chair req Min A for threading BUE into sleeves  Additional Comments: Throughout session pt limited per elevated HR and decreased cognition. Bed mobility  Comment: pt seated in chair at start/end of session  Transfers  Sit to stand: Moderate assistance  Stand to sit: Moderate assistance  Transfer Comments: utilizing RW req v/c for proper hand placement     Cognition  Overall Cognitive Status: Exceptions  Arousal/Alertness: Delayed responses to stimuli  Following Commands: Follows multistep commands with increased time; Follows multistep commands with repitition  Attention Span: Attends with cues to redirect  Safety Judgement: Decreased awareness of need for assistance;Decreased awareness of need for safety  Problem Solving: Assistance required to correct errors made;Decreased awareness of errors  Insights: Decreased awareness of deficits  Initiation: Requires cues for some  Sequencing: Requires cues for some  Cognition Comment: pt attempting to put suction tube in nose, not in mouth                  Education Given To: Patient  Education Provided: Role of Therapy; ADL Adaptive Strategies;Transfer Training  Education Provided Comments: proper hand and foot placement; c-collar precautions; balance maintaince-pt demo F carry over  Education Method: Verbal;Demonstration                                                         AM-PAC Score        AM-[facility] Inpatient Daily Activity Raw Score: 16 (04/27/22 1434)  AM-PAC Inpatient ADL T-Scale Score : 35.96 (04/27/22 1434)  ADL Inpatient CMS 0-100% Score: 53.32 (04/27/22 1434)  ADL Inpatient CMS G-Code Modifier : CK (04/27/22 1434)    Goals  Short Term Goals  Time Frame for Short term goals: By discharge, pt will:  Short Term Goal 1: Demo SBA for functional transfers and functional mobility with use of LRD for improved independence in ADLs/IADLs  Short Term Goal 2: Demo Mod I for UB ADLs with adaptive tech PRN  Short Term Goal 3: Demo SBA for LB ADLs and toileting tasks with setup and AE PRN  Short Term Goal 4: Demo 8+ min dynamic standing task with SBA and LRD with unilateral hand release for participation in ADLs/IADLs  Short Term Goal 5: Follow 75% of 3-step commands with appropriate initiation and sequencing to address cognitive deficits and improve functional independence with meaningful occupations       Therapy Time   Individual Concurrent Group Co-treatment   Time In 1307         Time Out 1332         Minutes 25         Timed Code Treatment Minutes: 25 Minutes       BILLIE Murry/EVANGELINA

## 2022-04-27 NOTE — PROGRESS NOTES
Physical Therapy  Facility/Department: [facility]  Physical Therapy progress note    Name: Felecia Haro  : 1955  MRN: [id number]  Date of Service: 2022    Discharge Recommendations:  Patient would benefit from continued therapy after discharge   PT Equipment Recommendations  Equipment Needed: Yes  Mobility Devices: Judene May: Rolling      Patient Diagnosis(es): The primary encounter diagnosis was Closed nondisplaced fracture of sixth cervical vertebra, unspecified fracture morphology, initial encounter ([facility] [address] Diagnoses of Closed nondisplaced fracture of seventh cervical vertebra, unspecified fracture morphology, initial encounter ([facility] [address] and Alcoholic ketoacidosis were also pertinent to this visit. Past Medical History:  has a past medical history of Asthma, Calculus of gallbladder without mention of cholecystitis or obstruction, Chronic back pain, Hyperlipidemia, Insomnia, unspecified, Spinal stenosis, unspecified region other than cervical, and Urinary incontinence. Past Surgical History:  has a past surgical history that includes Tonsillectomy; Colonoscopy; pr colonoscopy flx dx w/collj spec when pfrmd (N/A, 2018); Upper gastrointestinal endoscopy (2018); and cervical fusion (N/A, 2022). Assessment   Body Structures, Functions, Activity Limitations Requiring Skilled Therapeutic Intervention: Decreased functional mobility ; Decreased ROM; Decreased strength;Decreased endurance;Decreased safe awareness;Decreased coordination  Assessment: Pt with mobility deficits requiring min/mod-A 1-2 to perform sit<>stand transfer and to [id number] with a RW. Pt with poor motor planning this date, demonstrates significantly impaired standing balance, decreased BLE strength, and impaired endurance. Pt would be unsafe to return to prior living arrangements upon discharge secondary to high fall risk. Pt would benefit from additional therapy upon discharge.   Therapy Prognosis: Good  Decision Making: Medium Complexity  Clinical Presentation: evolving  Barriers to Learning: cognitive defeciets  Requires PT Follow-Up: Yes  Activity Tolerance  Activity Tolerance: Patient limited by fatigue;Patient limited by endurance;Treatment limited secondary to decreased cognition  Activity Tolerance Comments: very congested with mucous     Plan   Plan  Plan: 5-7 times per week  Current Treatment Recommendations: ROM,Strengthening,Functional mobility training,Patient/Caregiver education & training,Balance training,Transfer training,Safety education & training,Home exercise program,Equipment evaluation, education, & procurement,Gait training,Endurance training,Stair training,Therapeutic activities  Safety Devices  Type of Devices: Patient at risk for falls,Left in chair,Call light within reach,Nurse notified,Chair alarm in place,Gait belt  Restraints  Restraints Initially in Place: No  Restraints: no restraints this date     Restrictions  Restrictions/Precautions  Restrictions/Precautions: Seizure,Fall Risk  Required Braces or Orthoses?: Yes  Required Braces or Orthoses  Cervical: c-collar (at all times except for hygiene)  Position Activity Restriction  Other position/activity restrictions: SBP<140, s/p ACDF C6-7 04/19/2022     Subjective   Pain: Pt denies pain at this time  General  Chart Reviewed: Yes  Patient assessed for rehabilitation services?: Yes  Response To Previous Treatment: Patient with no complaints from previous session. Family / Caregiver Present: No  Follows Commands: Within Functional Limits  General Comment  Comments: Pt retired to chair  Subjective  Subjective: Pt supine in bed and agreeable to therapy, RN agreeable to therapy. Pt pleasant and cooperative throughout today's session.          Social/Functional History  Social/Functional History  Lives With: Alone  Type of Home: House  Home Layout: Two level,Bed/Bath upstairs  Home Access: Stairs to enter with rails  Entrance Stairs - Number of Steps: 5  Entrance Stairs - Rails: Both  Bathroom Shower/Tub: Tub/Shower unit  Bathroom Toilet: Handicap height  Bathroom Equipment: Grab bars in shower  Home Equipment:  (pt reports not owning or using any DME at a baseline.)  Has the patient had two or more falls in the past year or any fall with injury in the past year?: Yes  ADL Assistance: [address]: Independent  Homemaking Responsibilities: Yes  Ambulation Assistance: Independent  Transfer Assistance: Independent  Active : Yes  Mode of Transportation: [employer]  Occupation: Retired  Leisure & Hobbies: golfing, spending time with [employer]  Additional Comments: Pt reports daughters work but would be able to assist when not at work. Vision/Hearing       Cognition   Orientation  Overall Orientation Status: Within Functional Limits  Orientation Level: Oriented X4  Cognition  Overall Cognitive Status: Exceptions  Arousal/Alertness: Delayed responses to stimuli  Following Commands: Follows multistep commands with increased time; Follows multistep commands with repitition  Attention Span: Attends with cues to redirect  Safety Judgement: Decreased awareness of need for assistance;Decreased awareness of need for safety  Problem Solving: Assistance required to correct errors made;Decreased awareness of errors  Insights: Decreased awareness of deficits  Initiation: Requires cues for some  Sequencing: Requires cues for some  Cognition Comment: pt attempting to put suction tube in nose, not in mouth     Objective   Pulse: 142  Heart Rate Source: Telemetry  BP: 126/86  BP Location: Right upper arm  Patient Position: Supine  MAP (Calculated): 99.33  Resp: 22  SpO2: 92 %  O2 Device: Heated high flow cannula        Bed Mobility Training  Bed Mobility Training: No  Balance  Sitting: With support (seated in chair supported/unsupported tolerating approx 15 min at SBA)  Standing: With support (CGA at chair utilizing RW demo 0 LOB tolerating approx 2 min static standing)  Gait  Overall Level of Assistance:  (ERICH sec to elevated HR, pt HR increasing to 168 bpm with static standing)  Bed mobility  Supine to Sit: Moderate assistance;2 Person assistance;Minimal assistance  Sit to Supine: Unable to assess  Scooting: Minimal assistance  Comment: MOD assist x1, MIN x1 for sup to sit, MIN A to scoot to EOB, pt retired to chair end of treat  Transfers  Sit to Stand: Minimal Assistance;2 Person Assistance; Moderate Assistance  Stand to sit: Moderate Assistance;2 Person Assistance;Minimal Assistance  Bed to Chair: Moderate assistance;2 Person Assistance  Comment: Verbal cues for hand placement, MIN/MOD x2 for sit to stand stransfers and bed to chair tx, cognitive decline, multiple attempts to hold stand, pt would stand and sit right back  Ambulation  Surface: level tile  Device: Rolling Walker  Assistance: Moderate assistance  Quality of Gait: unsteady, decreased gait speed, decreased stride length, increased ROMAIN. Gait Deviations: Slow Diana; Increased ROMAIN; Decreased step length  Distance: 8 ft  Comments: Increased time for motor planning. Balance  Posture: Fair  Sitting - Static: Fair;+  Sitting - Dynamic: Fair  Standing - Static: Fair;-  Standing - Dynamic: Fair;-  Comments: standing balance assessed while using a RW  Exercise Treatment: Pt performed seated BLE ther ex and 6 STS, increased verbal cues for exercise instuction and safety awareness Seated LE exercise program: Long Arc Quads, hip abduction/adduction, heel/toe raises, and marches.  Reps: 15  Breathing Techniques: Incentive spirometer and accapella 6-10, required increased verbal instruction, pt very congested/mucousy and wearing C-Collar increased education required        AM-PAC Score  AM-PAC Inpatient Mobility Raw Score : 12 (04/27/22 1452)  AM-PAC Inpatient T-Scale Score : 35.33 (04/27/22 1452)  Mobility Inpatient CMS 0-100% Score: 68.66 (04/27/22 1452)  Mobility Inpatient CMS G-Code Modifier : CL (04/27/22 1452)          Goals  Short Term Goals  Time Frame for Short term goals: 14 visits  Short term goal 1: Pt will ambulate 175 feet with least restrictive device and CGA to increase functional independence. Short term goal 2: Pt will negotiate 5 stairs with bilateral handrails and CGA to allow the pt to enter prior living arrangements. Short term goal 3: Pt will demonstrate good- standing balance to decrease fall risk. Short term goal 4: Pt will tolerate a 35 minute therapy session to promote increased endurance. Short term goal 5: Pt will perform sit<>stand transfer with SBA to increase functional independence.   Additional Goals?: No       Therapy Time   Individual Concurrent Group Co-treatment   Time In 1140         Time Out 1220         Minutes 40         Timed Code Treatment Minutes: [id number], PTA

## 2022-04-27 NOTE — PLAN OF CARE
Problem: Confusion - Acute:  Goal: Absence of continued neurological deterioration signs and symptoms  Description: Absence of continued neurological deterioration signs and symptoms  4/27/2022 0750 by Vandana Miranda RN  Outcome: Progressing  4/27/2022 0041 by Vahe Fitzgerald  Outcome: Progressing  Goal: Mental status will be restored to baseline  Description: Mental status will be restored to baseline  4/27/2022 0750 by Vandana Miranda RN  Outcome: Progressing  4/27/2022 0041 by Vahe Fitzgerald  Outcome: Progressing     Problem: Discharge Planning:  Goal: Ability to perform activities of daily living will improve  Description: Ability to perform activities of daily living will improve  4/27/2022 0750 by Vandana Miranda RN  Outcome: Progressing  4/27/2022 0041 by Vahe Fitzgerald  Outcome: Progressing  Goal: Participates in care planning  Description: Participates in care planning  4/27/2022 0750 by Vandana Miranda RN  Outcome: Progressing  4/27/2022 0041 by Vahe Fitzgerald  Outcome: Progressing     Problem: Injury - Risk of, Physical Injury:  Goal: Absence of physical injury  Description: Absence of physical injury  4/27/2022 0750 by Vandana Miranda RN  Outcome: Progressing  4/27/2022 0041 by Vahe Fitzgerald  Outcome: Progressing  Goal: Will remain free from falls  Description: Will remain free from falls  4/27/2022 0750 by Vandana Miranda RN  Outcome: Progressing  4/27/2022 0041 by Vahe Fitzgerald  Outcome: Progressing     Problem: Mood - Altered:  Goal: Mood stable  Description: Mood stable  4/27/2022 0750 by Vandana Miranda RN  Outcome: Progressing  4/27/2022 0041 by Vahe Fitzgerald  Outcome: Progressing  Goal: Absence of abusive behavior  Description: Absence of abusive behavior  4/27/2022 0750 by Vandana Miranda RN  Outcome: Progressing  4/27/2022 0041 by Vahe Fitzgerald  Outcome: Progressing  Goal: Verbalizations of feeling emotionally comfortable while being cared for will increase  Description: Verbalizations of feeling emotionally comfortable while being cared for will increase  4/27/2022 0750 by Abril Ortiz RN  Outcome: Progressing  4/27/2022 0041 by Ene Renee  Outcome: Progressing     Problem: Psychomotor Activity - Altered:  Goal: Absence of psychomotor disturbance signs and symptoms  Description: Absence of psychomotor disturbance signs and symptoms  4/27/2022 0750 by Abril Ortiz RN  Outcome: Progressing  4/27/2022 0041 by Ene Renee  Outcome: Progressing     Problem: Sensory Perception - Impaired:  Goal: Demonstrations of improved sensory functioning will increase  Description: Demonstrations of improved sensory functioning will increase  4/27/2022 0750 by Abril Ortiz RN  Outcome: Progressing  4/27/2022 0041 by Ene Renee  Outcome: Progressing  Goal: Decrease in sensory misperception frequency  Description: Decrease in sensory misperception frequency  4/27/2022 0750 by Abril Ortiz RN  Outcome: Progressing  4/27/2022 0041 by Ene Renee  Outcome: Progressing  Goal: Able to refrain from responding to false sensory perceptions  Description: Able to refrain from responding to false sensory perceptions  4/27/2022 0750 by Abril Ortiz RN  Outcome: Progressing  4/27/2022 0041 by Ene Renee  Outcome: Progressing  Goal: Demonstrates accurate environmental perceptions  Description: Demonstrates accurate environmental perceptions  4/27/2022 0750 by Abril Ortiz RN  Outcome: Progressing  4/27/2022 0041 by Ene Renee  Outcome: Progressing  Goal: Able to distinguish between reality-based and nonreality-based thinking  Description: Able to distinguish between reality-based and nonreality-based thinking  4/27/2022 0750 by Abril Ortiz RN  Outcome: Progressing  4/27/2022 0041 by Ene Renee  Outcome: Progressing  Goal: Able to interrupt nonreality-based thinking  Description: Able to interrupt nonreality-based thinking  4/27/2022 0750 by Tram Garcia RN  Outcome: Progressing  4/27/2022 0041 by Berny De  Outcome: Progressing     Problem: Sleep Pattern Disturbance:  Goal: Appears well-rested  Description: Appears well-rested  4/27/2022 0750 by Tram Garcia RN  Outcome: Progressing  4/27/2022 0041 by Berny De  Outcome: Progressing     Problem: Skin Integrity:  Goal: Will show no infection signs and symptoms  Description: Will show no infection signs and symptoms  4/27/2022 0750 by Tram Garcia RN  Outcome: Progressing  4/27/2022 0041 by Berny De  Outcome: Progressing  Goal: Absence of new skin breakdown  Description: Absence of new skin breakdown  4/27/2022 0750 by Tram Garcia RN  Outcome: Progressing  4/27/2022 0041 by Berny De  Outcome: Progressing     Problem: Falls - Risk of:  Goal: Absence of physical injury  Description: Absence of physical injury  4/27/2022 0750 by Tram Garcia RN  Outcome: Progressing  4/27/2022 0041 by Berny De  Outcome: Progressing  Goal: Will remain free from falls  Description: Will remain free from falls  4/27/2022 0750 by Tram Garcia RN  Outcome: Progressing  4/27/2022 0041 by Berny De  Outcome: Progressing     Problem: Discharge Planning  Goal: Discharge to home or other facility with appropriate resources  4/27/2022 0750 by Tram Garcia RN  Outcome: Progressing  4/27/2022 0041 by Berny De  Outcome: Progressing     Problem: Pain  Goal: Verbalizes/displays adequate comfort level or baseline comfort level  4/27/2022 0750 by Tram Garcia RN  Outcome: Progressing  4/27/2022 0041 by Berny De  Outcome: Progressing     Problem: Nutrition Deficit:  Goal: Optimize nutritional status  4/27/2022 0750 by Tram Garcia RN  Outcome: Progressing  4/27/2022 0041 by Berny De  Outcome: Progressing     Problem: ABCDS Injury Assessment  Goal: Absence of physical injury  4/27/2022 0750 by Tara Hoyt RN  Outcome: Progressing  4/27/2022 0041 by Maribel Brooks  Outcome: Progressing

## 2022-04-27 NOTE — PROGRESS NOTES
Physical Medicine & Rehabilitation  Progress Note    4/27/2022 12:22 PM     CC: Ambulatory and ADL dysfunction due to fall with C6-7 dysfunction status post ACDF and corpectomy alcohol use    Trauma-noted continued agitation on Precedex attempting to wean continues on Valium, fentanyl every 2 hours and Tamar and Seroquel nightly, noted history of daily alcohol use, failed swallow study 1020 mentation improved    Subjective:   No complaints sitting in chair working with therapy. Continues with cervical collar. ROS:  Denies fevers, chills, sweats. No chest pain, palpitations, lightheadedness. Denies coughing, wheezing or shortness of breath. Denies abdominal pain, nausea, diarrhea or constipation. No new areas of joint pain. Denies new areas of numbness or weakness. Denies new anxiety or depression issues. No new skin problems. Rehabilitation:   PT:  Restrictions/Precautions: Seizure,Fall Risk  Other position/activity restrictions: SBP<140, s/p ACDF C6-7 04/19/2022  Required Braces or Orthoses  Cervical: c-collar (Aspen cervical collar at all times except during hygiene)   Transfers  Sit to Stand: Moderate Assistance  Stand to sit: Moderate Assistance  Comment: Verbal cues for hand placement. Ambulation  Surface: level tile  Device: Rolling Walker  Assistance: Moderate assistance  Quality of Gait: unsteady, decreased gait speed, decreased stride length, increased ROMAIN. Gait Deviations: Slow Diana,Increased ROMAIN,Decreased step length  Distance: 6 feet  Comments: Increased time for motor planning.   More Ambulation?: No          OT:  ADL  Feeding: Modified independent ,Setup  Grooming: Modified independent ,Setup  UE Bathing: Minimal assistance,Setup,Increased time to complete,Verbal cueing  LE Bathing: Setup,Verbal cueing,Increased time to complete,Moderate assistance  UE Dressing: Minimal assistance,Setup,Verbal cueing,Increased time to complete  LE Dressing: Setup,Verbal cueing,Increased time to complete,Moderate assistance  Toileting: Moderate assistance,Setup,Increased time to complete,Adaptive equipment         Balance  Sitting Balance: Contact guard assistance (CGA for static sitting and Min A for dynamic sitting/reaching; total sitting time ~20 min)  Standing Balance: Minimal assistance   Standing Balance  Time: ~1 min  Activity: standing prior to and following functional mobility with RW  Functional Mobility  Functional - Mobility Device: Rolling Walker  Activity: Other  Assist Level: Moderate assistance  Functional Mobility Comments: Pt completed short functional mobility from EOB > recliner utilizing RW and requiring Mod A d/t balance deficits. Pt demonstrates impulsive behaviors and difficulty with motor planning requiring VCs/TCs for appropriate RW navigation     Bed mobility  Rolling to Right: Moderate assistance  Supine to Sit: Moderate assistance (assist for trunk progression)  Sit to Supine:  (pt retired up to recliner chair at the conclusion of today's session)  Scooting: Minimal assistance  Comment: HOB elevated during supine > sit ~30 degrees; VCs for log roll tech with fair return  Transfers  Sit to stand: Moderate assistance  Stand to sit: Moderate assistance  Transfer Comments: 2 VCs for hand placement with poor return carryover                   ST: Pending speech evaluation          Objective:  /80   Pulse 110   Temp 98.4 °F (36.9 °C) (Oral)   Resp 18   Ht 6' 2\" (1.88 m)   Wt 260 lb 12.9 oz (118.3 kg)   SpO2 91%   BMI 33.49 kg/m²  I Body mass index is 33.49 kg/m².  I   Wt Readings from Last 1 Encounters:   22 260 lb 12.9 oz (118.3 kg)      Temp (24hrs), Av.6 °F (37 °C), Min:97.8 °F (36.6 °C), Max:99.3 °F (37.4 °C)         GEN: well developed, well nourished, no acute distress  HEENT: Normocephalic atraumatic, EOMI, mucous membranes pink and moist-Aspen collar  CV: RRR, no murmurs, rubs or gallops  PULM: Respirations WNL and unlabored  ABD: soft, NT, ND, +BS and equal  NEURO: A&O x3. Sensation intact to light touch. Currently knew year, president and location, follows commands though seems slow to respond  MSK: Moves all extremities at least antigravity  EXTREMITIES: No calf tenderness to palpation bilaterally. No edema BLEs  SKIN: warm dry and intact with good turgor  PSYCH: appropriately interactive        Medications   Scheduled Meds:   [START ON 4/28/2022] QUEtiapine  150 mg Oral Nightly    [START ON 4/28/2022] QUEtiapine  50 mg Oral Daily    diazePAM  10 mg Oral Q6H    methocarbamol  750 mg Oral Q6H    gabapentin  300 mg Oral Q8H    traZODone  100 mg Oral Nightly    famotidine  20 mg Oral BID    enoxaparin  30 mg SubCUTAneous BID    acetaminophen  1,000 mg Oral D9B    folic acid  1 mg Oral Daily    polyethylene glycol  17 g Oral Daily    phosphorus  500 mg Oral BID    atorvastatin  20 mg Oral Nightly     Continuous Infusions:   lactated ringers 10 mL/hr at 04/27/22 0824     PRN Meds:.oxyCODONE, melatonin, albuterol sulfate HFA, benzocaine-menthol, ondansetron **OR** ondansetron, hydrALAZINE     Diagnostics:     CBC:   Recent Labs     04/25/22 2043 04/26/22 0307 04/27/22  0333   WBC 7.7 6.5 8.1   RBC 3.78* 3.64* 3.82*   HGB 11.3* 11.2* 11.5*   HCT 35.7* 34.2* 35.9*   MCV 94.4 94.0 94.0   RDW 21.7* 21.6* 22.3*    246 335     BMP:   Recent Labs     04/25/22 2043 04/26/22 0307 04/27/22  0333    142 146*   K 3.1* 3.3* 3.3*   CL 95* 100 101   CO2 23 22 30   PHOS 3.4 4.0 3.4   BUN 5* 5* 15   CREATININE 0.46* 0.42* 0.51*     BNP: No results for input(s): BNP in the last 72 hours. PT/INR: No results for input(s): PROTIME, INR in the last 72 hours. APTT: No results for input(s): APTT in the last 72 hours. CARDIAC ENZYMES: No results for input(s): CKMB, CKMBINDEX, TROPONINT in the last 72 hours.     Invalid input(s): CKTOTAL;3  FASTING LIPID PANEL:  Lab Results   Component Value Date    CHOL 100 08/19/2021    HDL 41 08/19/2021    TRIG 82 08/19/2021     LIVER PROFILE: No results for input(s): AST, ALT, ALB, BILIDIR, BILITOT, ALKPHOS in the last 72 hours. I/O (24Hr): Intake/Output Summary (Last 24 hours) at 4/27/2022 1222  Last data filed at 4/27/2022 0824  Gross per 24 hour   Intake 1722.54 ml   Output 1100 ml   Net 622.54 ml       Glu last 24 hour  No results for input(s): POCGLU in the last 72 hours. No results for input(s): CLARITYU, COLORU, PHUR, SPECGRAV, PROTEINU, RBCUA, BLOODU, BACTERIA, NITRU, WBCUA, LEUKOCYTESUR, YEAST, GLUCOSEU, BILIRUBINUR in the last 72 hours. Impression: Mr. Callie Elder is a 75 y.o. male with a history of History of cervical fracture     1. Fall with C6-7 disruption, C7 radiculopathy status post ACDF C6-7 and C7 corpectomy Aspen collar at all times except hygiene  2. Daily alcohol use  3. Extubated 4/24  4. Noted diverticulosis and jejunal intussusception and umbilical hernia on CT  5. Currently n.p.o.-failed swallow study-noted plan to reevaluate 5 to 7 days with swallow study  6. Hypertension/hyperlipidemia-Lipitor  7. Chronic low back pain  8. Pain-Roxicodone, Motrin, Tylenol, fentanyl Neurontin, Robaxin  9. Agitation?/Delirium-Currently on Precedex, Seroquel Valium,and trazodone, melatonin, multiple attempts to pull NG tube   10. Hypokalemia potassium  11. Hypernatremia sodium 146     Recommendations:  1. Diagnosis: Fall with C7 radiculopathy status post ACDF C6-7 and C7 corpectomy  2. Therapy: Has PT OT and speech need  3. Medical  Necessity: As above  4. Support: Clarify-suspect will need at least supervision 24/7 due to cognition, risk of falls, drinking, cervical fracture/Aspen collar at all times except hygiene  5. Rehab recommendation: Suspect will benefit acute inpatient rehabilitation when medically ready, if has  support,     -Currently continues on Precedex, monitor participation  -Currently n.p.o., evaluate for diet NG tube-plan to recheck in 5 to 7 days with video swallow study     6. DVT proph: Lovenox     Discussed with nursing, and     Vic Petit MD       This note is created with the assistance of a speech recognition program.  While intending to generate a document that actually reflects the content of the visit, the document can still have some errors including those of syntax and sound a like substitutions which may escape proof reading.   In such instances, actual meaning can be extrapolated by contextual diversion

## 2022-04-27 NOTE — PLAN OF CARE
Problem: Confusion - Acute:  Goal: Absence of continued neurological deterioration signs and symptoms  Description: Absence of continued neurological deterioration signs and symptoms  4/27/2022 1950 by Maryjo Minaya RN  Outcome: Progressing  4/27/2022 0750 by Jennifer Zambrano RN  Outcome: Progressing  Goal: Mental status will be restored to baseline  Description: Mental status will be restored to baseline  4/27/2022 1950 by Maryjo Minaya RN  Outcome: Progressing  4/27/2022 0750 by Jennifer Zambrano RN  Outcome: Progressing     Problem: Discharge Planning:  Goal: Ability to perform activities of daily living will improve  Description: Ability to perform activities of daily living will improve  4/27/2022 1950 by Maryjo Minaya RN  Outcome: Progressing  4/27/2022 0750 by Jennifer Zambrano RN  Outcome: Progressing  Goal: Participates in care planning  Description: Participates in care planning  4/27/2022 1950 by Maryjo Minaya RN  Outcome: Progressing  4/27/2022 0750 by Jennifer Zambrano RN  Outcome: Progressing     Problem: Injury - Risk of, Physical Injury:  Goal: Absence of physical injury  Description: Absence of physical injury  4/27/2022 1950 by Maryjo Minaya RN  Outcome: Progressing  4/27/2022 0750 by Jennifer Zambrano RN  Outcome: Progressing  Goal: Will remain free from falls  Description: Will remain free from falls  4/27/2022 1950 by Maryjo Minaya RN  Outcome: Progressing  4/27/2022 0750 by Jennifer Zambrano RN  Outcome: Progressing     Problem: Mood - Altered:  Goal: Mood stable  Description: Mood stable  4/27/2022 1950 by Maryjo Minaya RN  Outcome: Progressing  4/27/2022 0750 by Jennifer Zambrano RN  Outcome: Progressing  Goal: Absence of abusive behavior  Description: Absence of abusive behavior  4/27/2022 1950 by Maryjo Minaya RN  Outcome: Progressing  4/27/2022 0750 by Jennifer Zambrano RN  Outcome: Progressing  Goal: Verbalizations of feeling emotionally comfortable while being cared for will increase  Description: Verbalizations of feeling emotionally comfortable while being cared for will increase  4/27/2022 1950 by Rena Russ RN  Outcome: Progressing  4/27/2022 0750 by Caroline Schulte RN  Outcome: Progressing     Problem: Psychomotor Activity - Altered:  Goal: Absence of psychomotor disturbance signs and symptoms  Description: Absence of psychomotor disturbance signs and symptoms  4/27/2022 1950 by Rena Russ RN  Outcome: Progressing  4/27/2022 0750 by Caroline Schulte RN  Outcome: Progressing     Problem: Sensory Perception - Impaired:  Goal: Demonstrations of improved sensory functioning will increase  Description: Demonstrations of improved sensory functioning will increase  4/27/2022 1950 by Rena Russ RN  Outcome: Progressing  4/27/2022 0750 by Caroline Schulte RN  Outcome: Progressing  Goal: Decrease in sensory misperception frequency  Description: Decrease in sensory misperception frequency  4/27/2022 1950 by Rena Russ RN  Outcome: Progressing  4/27/2022 0750 by Caroline Schulte RN  Outcome: Progressing  Goal: Able to refrain from responding to false sensory perceptions  Description: Able to refrain from responding to false sensory perceptions  4/27/2022 1950 by Rena Russ RN  Outcome: Progressing  4/27/2022 0750 by Caroline Schulte RN  Outcome: Progressing  Goal: Demonstrates accurate environmental perceptions  Description: Demonstrates accurate environmental perceptions  4/27/2022 1950 by Rena Russ RN  Outcome: Progressing  4/27/2022 0750 by Caroline Schulte RN  Outcome: Progressing  Goal: Able to distinguish between reality-based and nonreality-based thinking  Description: Able to distinguish between reality-based and nonreality-based thinking  4/27/2022 1950 by Rena Russ RN  Outcome: Progressing  4/27/2022 0750 by Caroline Schulte RN  Outcome: Progressing  Goal: Able to interrupt nonreality-based thinking  Description: Able to interrupt nonreality-based thinking  4/27/2022 1950 by Reyes Chávez RN  Outcome: Progressing  4/27/2022 0750 by Stefano Stephen RN  Outcome: Progressing     Problem: Sleep Pattern Disturbance:  Goal: Appears well-rested  Description: Appears well-rested  4/27/2022 1950 by Reyes Chávez RN  Outcome: Progressing  4/27/2022 0750 by Stefano Stephen RN  Outcome: Progressing     Problem: Skin Integrity:  Goal: Will show no infection signs and symptoms  Description: Will show no infection signs and symptoms  4/27/2022 1950 by Reyes Chávez RN  Outcome: Progressing  4/27/2022 0750 by Stefano Stephen RN  Outcome: Progressing  Goal: Absence of new skin breakdown  Description: Absence of new skin breakdown  4/27/2022 1950 by Reyes Chávez RN  Outcome: Progressing  4/27/2022 0750 by Stefano Stephen RN  Outcome: Progressing     Problem: Falls - Risk of:  Goal: Absence of physical injury  Description: Absence of physical injury  4/27/2022 1950 by Reyes Chávez RN  Outcome: Progressing  4/27/2022 0750 by Stefano Stephen RN  Outcome: Progressing  Goal: Will remain free from falls  Description: Will remain free from falls  4/27/2022 1950 by Reyes Chávez RN  Outcome: Progressing  4/27/2022 0750 by Stefano Stephen RN  Outcome: Progressing     Problem: Discharge Planning  Goal: Discharge to home or other facility with appropriate resources  4/27/2022 1950 by Reyes Chávez RN  Outcome: Progressing  4/27/2022 0750 by Stefano Stephen RN  Outcome: Progressing     Problem: Pain  Goal: Verbalizes/displays adequate comfort level or baseline comfort level  4/27/2022 1950 by Reyes Chávez RN  Outcome: Progressing  4/27/2022 0750 by Stefano Stephen RN  Outcome: Progressing     Problem: Nutrition Deficit:  Goal: Optimize nutritional status  4/27/2022 1950 by Reyes Chávez RN  Outcome: Progressing  4/27/2022 0750 by Stefano Stephen RN  Outcome: Progressing     Problem: ABCDS Injury Assessment  Goal: Absence of physical injury  4/27/2022 1950 by Reyna Carrizales RN  Outcome: Progressing  4/27/2022 0750 by Delwyn Homans, RN  Outcome: Progressing

## 2022-04-27 NOTE — PLAN OF CARE
Problem: Confusion - Acute:  Goal: Absence of continued neurological deterioration signs and symptoms  Description: Absence of continued neurological deterioration signs and symptoms  4/27/2022 0041 by Lexi Ibrahim  Outcome: Progressing     Problem: Confusion - Acute:  Goal: Mental status will be restored to baseline  Description: Mental status will be restored to baseline  4/27/2022 0041 by Lexi Ibrahim  Outcome: Progressing     Problem: Discharge Planning:  Goal: Ability to perform activities of daily living will improve  Description: Ability to perform activities of daily living will improve  4/27/2022 0041 by Lexi Ibrahim  Outcome: Progressing     Problem: Discharge Planning:  Goal: Participates in care planning  Description: Participates in care planning  4/27/2022 0041 by [facility]  Outcome: Progressing       Problem: Sleep Pattern Disturbance:  Goal: Appears well-rested  Description: Appears well-rested  4/27/2022 0041 by [facility]  Outcome: Progressing     Problem: Discharge Planning  Goal: Discharge to home or other facility with appropriate resources  4/27/2022 0041 by [facility]  Outcome: Progressing

## 2022-04-27 NOTE — PROGRESS NOTES
ICU PROGRESS NOTE    PATIENT NAME: Daniel Vega  MEDICAL RECORD NO. [id number]  DATE: 4/27/2022    PRIMARY CARE PHYSICIAN: Azeem Dunbar MD    HD: # 8    ASSESSMENT    Patient Active Problem List   Diagnosis    Insomnia    Moderate persistent asthma with acute exacerbation    Mixed hyperlipidemia    Adverse drug reaction    Liver disease, chronic, due to alcohol ([facility]    COPD (chronic obstructive pulmonary disease) with chronic bronchitis (HCC)    Obstructive sleep apnea    Paroxysmal SVT (supraventricular tachycardia) (HCC)    Mild persistent asthma without complication    Dysphagia    History of cervical fracture    Closed fracture of cervical vertebra (HCC)    Cervical radiculopathy    Hypokalemia    Fall    Hypophosphatemia    Acute respiratory failure ([facility]    Blood alcohol level of 240 mg/100 ml or more    Anemia    Cervical spine fracture [facility])       MEDICAL DECISION MAKING AND PLAN    Neuro:  - WUA 25, following commands, conversing appropriately  - Pain/sedation: Valium 5mg q6, MMT, fentanyl 50 q2 prn, markie 5mg q6 prn, seroquel 100mg nightly, weaning precedex  - Daily ETOH use  - Thiamine and folic acid   - CTH neg for acute injuries  - CT C spine showing C6-7 fx, MRI C spine showing epidural hemorrhage at C6-7 amd prevertebral soft tissue swelling         - MRA neck showing no significant stenosis        - Intubated due to stridor, DL showing some airway edema        - 4/19 ORIF C6-7 by neurosurgery        - SBP <140 per nsg     CV  - RS   - MAP 87-97, no pressor requirements  - Hydralazine 10mg q6 prn for SBP goal <140 per nsg  - Troponin 21, EKG stable on admission  -   - No significant cardiac hx on chart     Pulm  - Extubated 4/24  - SpO2 93-98% on 2L NC, CPAP or HFNC overnight   - CXR showing pulmonary edema         - Lasix given with good response   - CXR showing improvements in pulmonary edema  - No hx smoking or home O2 requirements     GI/Nutrition  - Diet tube feeds goal 65 ml/hr  - Failed formal swallow study, attempt when mentation improves  - Ppx: protonix, glycolax  - CT AP showing no acute traumatic injuries, diverticulosis with jejunal intussusception, small fat containing umbilical hernia     Renal/lytes  - BUN/Cr 15/0.51  - Na/K 146/3.3, K replaced  - Mg/P 2.2/3.4  - Total fluid cap 75 ml/hr, LR maintenance  - I/Os 1590/1450 in last 24h, 0.5 cc/kg/hr  - 10L pos, lasix 40mg x1 today, will f/u response     Heme  - Hb 11.5 (11.2)  - Plt 335 (246)  - DVT ppx lovenox     7.   Endocrine        - -144        - Continue to monitor, no known hx DM     Micro  - Tmax 37.4  - WBC  8.1 (6.5)  - Monitor off antimicrobials     Family/dispo  - Wean precedex  - PT/OT  - Ok for stepdown if able to wean off precedex     Lines  - PIV    CHECKLIST    CAM-ICU RASS: 0  RESTRAINTS: b/l soft wrists  IVF: LR  NUTRITION: tube feeds  ANTIBIOTICS: n/a  GI: pepcid  DVT: lovenox  GLYCEMIC CONTROL: n/a  HOB >45: n/a  MOBILITY: up with assistance  SBT: n/a  IS: daily    SUBJECTIVE    Kelby Henson was mildly agitated overnight and required precedex and ativan. Currently doing well on 0.4 mcg/kg/hr. No other issues at this time.      OBJECTIVE  VITALS: Temp: Temp: 99.3 °F (37.4 °C)Temp  Av.6 °F (37 °C)  Min: 97.8 °F (36.6 °C)  Max: 99.5 °F (63.8 °C) BP Systolic (15BHX), CHUN:936 , Min:86 , BGX:612   Diastolic (84PXZ), OPJ:49, Min:49, Max:125   Pulse Pulse  Av.4  Min: 66  Max: 152 Resp Resp  Av.3  Min: 12  Max: 22 Pulse ox SpO2  Av.2 %  Min: 89 %  Max: 100 %    CONSTITUTIONAL: GCS 15, following commands, conversing appropriately  HEENT: PERRLA  LUNGS: clear bilaterally  CV: HRRR  GI: abdomen soft and non tender  MUSCULOSKELETAL: intact  NEUROLOGIC: GCS 15, following commands, moving all 4 spontaneouslly  SKIN: intact    LAB:  CBC:   Recent Labs     22  2043 22  0307 22  0333   WBC 7.7 6.5 8.1   HGB 11.3* 11.2* 11.5*   HCT 35.7* 34.2* 35.9*   MCV 94.4 94.0 94.0    246 335     BMP:   Recent Labs     04/25/22  0500 04/25/22  2043 04/26/22  0307    139 142   K 3.2* 3.1* 3.3*   CL 99 95* 100   CO2 25 23 22   BUN 6* 5* 5*   CREATININE 0.35* 0.46* 0.42*   GLUCOSE 116* 144* 135*       RADIOLOGY:  CXR: 04/27/22 pending final read, but appears relatively clear today    4/26/22  Impression   Mild bilateral congestion.      Glenn Garvey MD  4/27/22, 4:25 AM

## 2022-04-27 NOTE — CARE COORDINATION
Placed call to pt's daughter, Kylah Goodman, updated her on Dr. Ani Riley recommendations and to clarify if pt would have 24/7 support upon discharge from [place] [id number] and per Kylah Goodman, she will discuss with her sister, Goran Hernandez, as per Kylah Goodman, both she and her sister work, and pt is allergic to their animals, so it is not likely that pt would be able to stay at their house after ARU. Possible need for SNF instead of ARU discussed and per Kylah Rex, she will discuss with Goran Hernandez and will notify writer of their decision. [address] with pt's daughter, Goran Hernandez, after she visited her father and per Goran Hernandez, pt would not have 24/7 assistance after discharge from ARU. Goran Hernandez states that she works part-time, and asked her dad if he wanted to come home with her and he said \"no. \" Goran Hernandez states that she discussed SNF vs ARU with her dad and they agreed that SNF would be the better option, at this time. Writer met with pt, and per pt, he is in agreement with a referral to SNF. Writer's daughter, Kylah Goodman, came in to visit while jslyhl Gwynedd was in room, questions answered. Per Kylah Goodman, she was told that if pt remains unable to eat orally, that he may need a PEG tube, writer discussed that this was a possibility, pt questioned time frame on this, writer will follow-up with physicians tomorrow. Pt is currently on 6L NC.    [id number] - Placed call to the [facility], left message, call back requested. [phone] - Received call from Demetrice Gonzalez at [facility], she will follow-up with referral tomorrow, will notify if they can accept.

## 2022-04-28 NOTE — CONSULTS
Attestation signed by      Attending Physician Statement:    I have discussed the care of  Felecia Haro , including pertinent history and exam findings, with the Cardiology fellow/resident. I have seen and examined the patient and the key elements of all parts of the encounter have been performed by me. I agree with the assessment, plan and orders as documented by the fellow/resident, after I modified exam findings and plan of treatments, and the final version is my approved version of the assessment. Additional Comments:   Hypoxia  PEA arrest  Intubated  New AF with RVR  Hypotension on pressors  S/p ORIF c6-7 neurosurgery  - despite IV amio and IV dig- he remains RVR  - d/w family- consent obtain  - will proceed with CV  - continue heparin drip  - check 2d echo  - remainder of management per ICU team    [address] [place] [address] - [place], [address], [address], [address] [facility]  [facility]  [phone]     [facility]   Consultation Note               Today's Date: 4/28/2022  Patient Name: Felecia Haro  Date of admission: 4/19/2022  7:31 AM  Patient's age: 75 y.o., 1955  Admission Dx: Alcoholic ketoacidosis [X81.5]  History of cervical fracture [Z87.81]  Closed nondisplaced fracture of seventh cervical vertebra, unspecified fracture morphology, [facility] [S12.601A]  Closed nondisplaced fracture of sixth cervical vertebra, unspecified fracture morphology, initial encounter ([facility] Jovan Alonso    Reason for Consult:  PEA arrest and afib    Requesting Physician: Christelle Diaz MD    CHIEF COMPLAINT:    Chief Complaint   Patient presents with   Floydene Ada Fall     transfer from [facility]        History Obtained From:  EMR and family    HISTORY OF PRESENT ILLNESS:      The patient is a 75 y.o. gentleman was admitted was fall with cervica lfractures. He underwent  ORIF C6-7 by neurosurgery on 4/21/22.   Reportedly early this AM he was rapidly dropping his O2 saturations and went into PEA arrest. Post ROSC EKG showed Afib with RVR. Pt was started on amio bolus followed by gtt. Per family at bedside, no prior hx of afib or known cardiac hx. Past Medical History:   has a past medical history of Asthma, Calculus of gallbladder without mention of cholecystitis or obstruction, Chronic back pain, Hyperlipidemia, Insomnia, unspecified, Spinal stenosis, unspecified region other than cervical, and Urinary incontinence. Past Surgical History:   has a past surgical history that includes Tonsillectomy; Colonoscopy; pr colonoscopy flx dx w/collj spec when pfrmd (N/A, 8/29/2018); Upper gastrointestinal endoscopy (8/29/2018); and cervical fusion (N/A, 4/19/2022). Home Medications:    Prior to Admission medications    Medication Sig Start Date End Date Taking? Authorizing Provider   traZODone (DESYREL) 150 MG tablet take 1 tablet by mouth at bedtime 4/12/22   Pricila Pruitt MD   pantoprazole (PROTONIX) 40 MG tablet Take 1 tablet by mouth daily 2/22/22   Concepción Hahn MD   pantoprazole (PROTONIX) 40 MG tablet take 1 tablet by mouth every morning before breakfast 2/21/22   Love Espitia MD   atorvastatin (LIPITOR) 20 MG tablet Take 1 tablet by mouth daily 2/21/22   Love Espitia MD   albuterol sulfate  (90 Base) MCG/ACT inhaler inhale 1 puff by mouth and INTO THE LUNGS every 4 hours if needed for wheezing 2/1/22   Pricila Pruitt MD   montelukast (SINGULAIR) 10 MG tablet Take 1 tablet by mouth nightly 1/5/22   Pricila Pruitt MD   BREO ELLIPTA 200-25 MCG/INH AEPB inhaler Inhale 1 puff into the lungs daily 1/5/22   Pricila Pruitt MD   azelastine (ASTELIN) 0.1 % nasal spray 1 spray by Nasal route 2 times daily Use in each nostril as directed 1/5/22   Pricila Pruitt MD   clobetasol (TEMOVATE) 0.05 % ointment Apply topically 2 times daily.  8/5/21   Pricila Pruitt MD   clindamycin (CLEOCIN) 150 MG capsule take 1 capsule by mouth three times a day  Patient not taking: Reported on 8/5/2021 5/21/21   Historical Provider, MD   ibuprofen (ADVIL;MOTRIN) 800 MG tablet take 1 tablet by mouth three times a day  Patient not taking: Reported on 8/5/2021 5/21/21   Historical Provider, MD   penicillin v potassium (VEETID) 500 MG tablet take 1 tablet by mouth three times a day  Patient not taking: Reported on 8/5/2021 5/1/21   Historical Provider, MD   PREVIDENT 5000 ENAMEL PROTECT 1.1-5 % PSTE APPLY TO TEETH,SWISH FOR 30 SECONDS THEN EXPECTORATE ONCE DAILY DO NOT EAT OR DRINK FOR 30 MINUTES 6/6/21   Historical Provider, MD   nystatin (MYCOSTATIN) 028680 UNIT/ML suspension Take 5 mLs by mouth 4 times daily 9/19/18   Robina Miller MD   ibuprofen (ADVIL;MOTRIN) 400 MG tablet Take 400 mg by mouth every 6 hours as needed for Pain.     Historical Provider, MD      Current Facility-Administered Medications: norepinephrine (LEVOPHED) 16 mg in sodium chloride 0.9 % 250 mL infusion, 1-100 mcg/min, IntraVENous, Continuous  chlorhexidine (PERIDEX) 0.12 % solution 15 mL, 15 mL, Mouth/Throat, BID  famotidine (PEPCID) 20 mg in sodium chloride (PF) 10 mL injection, 20 mg, IntraVENous, BID  vasopressin 20 Units in dextrose 5 % 100 mL infusion, 0.04 Units/min, IntraVENous, Continuous  fentaNYL 50 mcg/mL 50 mL infusion,  mcg/hr, IntraVENous, Continuous  [COMPLETED] amiodarone (CORDARONE) 150 mg in dextrose 5 % 100 mL bolus, 150 mg, IntraVENous, Once **FOLLOWED BY** amiodarone (CORDARONE) 450 mg in dextrose 5 % 250 mL infusion, 1 mg/min, IntraVENous, Continuous **FOLLOWED BY** amiodarone (CORDARONE) 450 mg in dextrose 5 % 250 mL infusion, 0.5 mg/min, IntraVENous, Continuous  propofol injection, 5-30 mcg/kg/min, IntraVENous, Continuous  insulin lispro (HUMALOG) injection vial 0-18 Units, 0-18 Units, SubCUTAneous, Q4H  dextrose 50 % IV solution, 12.5 g, IntraVENous, PRN  glucagon (rDNA) injection 1 mg, 1 mg, IntraMUSCular, PRN  dextrose 5 % solution, 100 mL/hr, IntraVENous, PRN  heparin 25,000 units in 0.9% sodium chloride 250 mL infusion, 5-30 Units/kg/hr, IntraVENous, Continuous  insulin regular (HUMULIN R;NOVOLIN R) 100 Units in sodium chloride 0.9 % 100 mL infusion, 1-50 Units/hr, IntraVENous, Continuous  metoprolol (LOPRESSOR) injection 5 mg, 5 mg, IntraVENous, Once  phenylephrine (SONALI-SYNEPHRINE) 50 mg in dextrose 5 % 250 mL infusion,  mcg/min, IntraVENous, Continuous  oxyCODONE (ROXICODONE) immediate release tablet 2.5 mg, 2.5 mg, Oral, Q6H PRN  albuterol sulfate  (90 Base) MCG/ACT inhaler 2 puff, 2 puff, Inhalation, Q4H PRN  benzocaine-menthol (DERMOPLAST) 20-0.5 % spray, , Topical, PRN  acetaminophen (TYLENOL) tablet 1,000 mg, 1,000 mg, Oral, J8E  folic acid (FOLVITE) tablet 1 mg, 1 mg, Oral, Daily  polyethylene glycol (GLYCOLAX) packet 17 g, 17 g, Oral, Daily  ondansetron (ZOFRAN-ODT) disintegrating tablet 4 mg, 4 mg, Oral, Q8H PRN **OR** ondansetron (ZOFRAN) injection 4 mg, 4 mg, IntraVENous, Q6H PRN  lactated ringers infusion, , IntraVENous, Continuous  atorvastatin (LIPITOR) tablet 20 mg, 20 mg, Oral, Nightly  hydrALAZINE (APRESOLINE) injection 10 mg, 10 mg, IntraVENous, Q6H PRN      Allergies:  Seasonal and Cat hair extract      Social History:   reports that he quit smoking about 14 years ago. His smoking use included cigarettes. He has a 24.00 pack-year smoking history. He has never used smokeless tobacco. He reports current alcohol use. He reports that he does not use drugs. Family History: family history is not on file. No h/o sudden cardiac death.       REVIEW OF SYSTEMS:    Unable to perform    PHYSICAL EXAM:      BP 99/86   Pulse 163   Temp 98.6 °F (37 °C) (Axillary)   Resp (!) 32   Ht 6' 2\" (1.88 m)   Wt 260 lb 12.9 oz (118.3 kg)   SpO2 97%   BMI 33.49 kg/m²    Constitutional and General Appearance: intubated and sedated  Respiratory:  Mechanically ventilated  Cardiovascular:  Normal S1 and S2.   Jugular venous pulsation Normal  Abdomen:   Soft  No tenderness  Extremities:  No lower extremity edema  Neurologic:  Intubated and sedated      DATA:    Diagnostics:    Labs:     CBC:   Recent Labs     04/28/22  0557 04/28/22  0937   WBC 13.3* 16.5*   HGB 9.8* 10.5*   HCT 31.4* 34.2*   PLT See Reflexed IPF Result 431     BMP:   Recent Labs     04/28/22  0353 04/28/22  0557    143   K 3.4* 3.2*   CO2 32* 28   BUN 16 18   CREATININE 0.47* 0.65*   LABGLOM >60 >60   GLUCOSE 182* 262*     BNP: No results for input(s): BNP in the last 72 hours. PT/INR: No results for input(s): PROTIME, INR in the last 72 hours. APTT:No results for input(s): APTT in the last 72 hours. CARDIAC ENZYMES:  Recent Labs     04/27/22  0333 04/27/22  0955 04/28/22  0745   TROPHS 43* 47* 92*     No results for input(s): CKTOTAL, CKMB, CKMBINDEX, TROPONINI in the last 72 hours. Invalid input(s):  TROPONINT  No results for input(s): TROPONINT in the last 72 hours. FASTING LIPID PANEL:  Lab Results   Component Value Date    HDL 41 08/19/2021    TRIG 82 08/19/2021     LIVER PROFILE:No results for input(s): AST, ALT, LABALBU in the last 72 hours. EKG: Afib with RVR          IMPRESSION:    PEA arrest likely 2/2 hypoxia  Hx of COPD  Afib with RVR post arrest, new onset. Was started on amio gtt  S/p ORIF C6-7 by neurosurgery        RECOMMENDATIONS:  Patient was given IV amio, however HR remain in 150's. Currently on levephed  Patient is on heparin gtt. Will attempt cardioversion        Thank you for allowing us to participate in [address] [place] [address]. Will follow with you.       Electronically signed on 04/28/22 at 10:12 AM by:    Sharyn Matos MD, MD   Fellow, [address] [place] [address]

## 2022-04-28 NOTE — SIGNIFICANT EVENT
Around 0400 pt was extremely agitated at bedside, throwing legs out of bed, yelling, etc. This RN reentered pt room, calmed pt down and repositioned. When I left pt room HR was 140-150's, this RN allowed a few minutes to recover, around 0420 pt HR returned to normal/ and pt appeared to be resting. Around 0430, I reentered pt room due to him desatting on the monitor, I bumped his O2 from 4L NC to [facility] and began to reassess pt. I then noticed pt was less responsive and ran for NRB due to O2 dropping rapidly from 90-80's and down. I passed the charge RN and asked her to enter pt room to watch him while I grabbed a NRB because he did not look good and O2 was dropping fast. Upon grabbing NRB another RN asked for AMBU bag and when entering back into pt room pt PEA arrested and CPR had been started. See code note for information and ROSC. Dr. Pretty Ramachandran at bedside giving verbal orders for levo titration (see MAR). Adri and doctors at bedside. Daughters called. Dr. Florecita Nath daughters that this may have been a mucous issue d/t secretions, daughters understood hes had these issues in the past. Labs drawn, stat orders placed. Pt post CPR EKG showed Afib, amio bolus 150mg given by this RN. Dr. Car Neville to wait on bolus and reassess d/t pt now being rate controlled. Report given to [facility], spoke with charge regarding required documentation. All completed.

## 2022-04-28 NOTE — CONSULTS
Palliative Care Inpatient Consult    NAME:  Atnonia Webster  MEDICAL RECORD NUMBER:  [id number]  AGE: 75 y.o. GENDER: male  : 1955  TODAY'S DATE:  2022    Reasons for Consultation:    Goals of care  Family support    Members of PC team contributing to this consultation are :  Dr. Dhara Anthony:  I met with the patient's daughters - Jose Angel Nguyen and Juan Luis Wells today, along with his sister. They were familiar with Palliative Care. The patient had struggled with alcohol for many years, had instances of sobriety but relapsed. When he drinks, he does not quite remember events at the time and had fallen while intoxicated before. His daughters understood that alcoholism was a disease the patient was struggling with and tried to be a support for him however they could. When this event happened, they were hopeful that he would recover, however, became tearful discussing the events this morning and the chance that he may not recover to baseline. They were remaining hopeful that he would come out of this and were awaiting updates from Neuro-Critical Care. We discussed what the patient would want, whether he would want to be on life support for extended periods of time. They knew he would not want this because he valued his independence. It robert took convincing for him to accept discharge to a nursing home. They were already thinking about 'worse case scenario' but wanted all information before making any decisions and I agreed. Communications with primary service  Substance abuse issues  Decision making regarding life prolonging treatment  Caregiver support/education  Continue with current plan of care  Clarification of medical condition to patient and family  Code status clarified: Full Code    Additional Assessments:     1- Symptom management/ pain control     The patient is unresponsive post cardiac arrest.     We feel the patient symptoms are being controlled.  his current regimen is reviewed by myself and discussed with the staff. 2- Goals of care evaluation   The patient goals of care are provide comfort care/support/palliation/relieve suffering and support for family/caregiver   Goals of care discussed with:    [] Patient independently    [] Patient and Family    [x] Family or Healthcare DPOA independently    [] Unable to discuss with patient, family/DPOA not present    3- Code Status  Full Code    4- Other recommendations   - We will continue to provide comfort and support to the patient and the family  - will follow up goals of care discussion as the patient's condition evolves. Palliative Care will continue to follow Mr. Henson's care as needed. Thank you for allowing Palliative Care to participate in the care of Mr. Henson . History of Present Illness     The patient is a 75 y.o. male with a PMHx of alcohol abuse presented following a fall down 15 steps. It resulted in fractures of C6/7 facets C6/7 disc rupture. He underwent ORIF of C6/7 on 4/19/22. Unfortunately, he developed respiratory distress and this morning, went into PEA arrest. He was down for about 15 minutes before ROSC was achieved. Currently, remains unresponsive off sedation. Awaiting Neuro-Critical care evaluation since he was having myoclonal jerking post arrest.      Referred to Palliative Care by   [x] Physician   [] Nursing  [] Family Request   [] Other:       Active Hospital Problems    Diagnosis Date Noted    Hypokalemia [E87.6] 04/21/2022     Priority: Medium    Fall [W19. XXXA] 04/21/2022     Priority: Medium    Hypophosphatemia [E83.39] 04/21/2022     Priority: Medium    Acute respiratory failure ([facility] [address] [J96.00] 04/21/2022     Priority: Medium    Anemia [D64.9] 04/21/2022     Priority: Medium    Cervical spine fracture ([facility] [address] [S12. 9XXA] 04/21/2022     Priority: Medium    Blood alcohol level of 240 mg/100 ml or more [Y90.8] 04/21/2022    History of cervical fracture [Z87.81] 2022       PAST MEDICAL HISTORY      Diagnosis Date    Asthma     Calculus of gallbladder without mention of cholecystitis or obstruction     Chronic back pain     Hyperlipidemia     Insomnia, unspecified     Spinal stenosis, unspecified region other than cervical     Urinary incontinence        PAST SURGICAL HISTORY  Past Surgical History:   Procedure Laterality Date    CERVICAL FUSION N/A 2022    C7 CORPECTOMY performed by Arben Clemente DO at [facility] 11 MI COLONOSCOPY FLX DX W/COLLJ SPEC WHEN PFRMD N/A 2018    COLONOSCOPY performed by Akshat Hughes MD at [facility]  2018    EGD BIOPSY performed by Akshat Hughes MD at [address] [place] [address] History     Tobacco Use    Smoking status: Former Smoker     Packs/day: 1.00     Years: 24.00     Pack years: 24.00     Types: Cigarettes     Quit date:      Years since quittin.3    Smokeless tobacco: Never Used   Vaping Use    Vaping Use: Never used   Substance Use Topics    Alcohol use: Yes     Comment: daily drinker (vodka)    Drug use: No       FAMILY HISTORY  History reviewed. No pertinent family history. ALLERGIES  Allergies   Allergen Reactions    Seasonal Other (See Comments)     Sneezing    Cat Hair Extract Rash       MEDICATIONS  Current Medications    chlorhexidine  15 mL Mouth/Throat BID    famotidine (PEPCID) injection  20 mg IntraVENous BID    insulin lispro  0-18 Units SubCUTAneous Q4H    metoprolol  5 mg IntraVENous Once    acetaminophen  1,000 mg Oral S6J    folic acid  1 mg Oral Daily    polyethylene glycol  17 g Oral Daily    atorvastatin  20 mg Oral Nightly     dextrose, glucagon (rDNA), dextrose, oxyCODONE, albuterol sulfate HFA, benzocaine-menthol, ondansetron **OR** ondansetron, hydrALAZINE  Home Medications  No current facility-administered medications on file prior to encounter.      Current Outpatient Medications on File Prior to Encounter   Medication Sig Dispense Refill    traZODone (DESYREL) 150 MG tablet take 1 tablet by mouth at bedtime 90 tablet 0    pantoprazole (PROTONIX) 40 MG tablet Take 1 tablet by mouth daily 30 tablet 5    pantoprazole (PROTONIX) 40 MG tablet take 1 tablet by mouth every morning before breakfast 90 tablet 5    atorvastatin (LIPITOR) 20 MG tablet Take 1 tablet by mouth daily 90 tablet 3    albuterol sulfate  (90 Base) MCG/ACT inhaler inhale 1 puff by mouth and INTO THE LUNGS every 4 hours if needed for wheezing 25.5 g 3    montelukast (SINGULAIR) 10 MG tablet Take 1 tablet by mouth nightly 30 tablet 2    BREO ELLIPTA 200-25 MCG/INH AEPB inhaler Inhale 1 puff into the lungs daily 60 each 1    azelastine (ASTELIN) 0.1 % nasal spray 1 spray by Nasal route 2 times daily Use in each nostril as directed 30 mL 2    clobetasol (TEMOVATE) 0.05 % ointment Apply topically 2 times daily. 100 g 3    clindamycin (CLEOCIN) 150 MG capsule take 1 capsule by mouth three times a day (Patient not taking: Reported on 8/5/2021)      ibuprofen (ADVIL;MOTRIN) 800 MG tablet take 1 tablet by mouth three times a day (Patient not taking: Reported on 8/5/2021)      penicillin v potassium (VEETID) 500 MG tablet take 1 tablet by mouth three times a day (Patient not taking: Reported on 8/5/2021)      PREVIDENT 5000 ENAMEL PROTECT 1.1-5 % PSTE APPLY TO TEETH,SWISH FOR 30 SECONDS THEN EXPECTORATE ONCE DAILY DO NOT EAT OR DRINK FOR 30 MINUTES      nystatin (MYCOSTATIN) 388705 UNIT/ML suspension Take 5 mLs by mouth 4 times daily 2 Bottle 1    ibuprofen (ADVIL;MOTRIN) 400 MG tablet Take 400 mg by mouth every 6 hours as needed for Pain.          Data         /77   Pulse 91   Temp 98.6 °F (37 °C) (Axillary)   Resp (!) 31   Ht 6' 2\" (1.88 m)   Wt 260 lb 12.9 oz (118.3 kg)   SpO2 97%   BMI 33.49 kg/m²     Wt Readings from Last 3 Encounters:   04/24/22 260 lb 12.9 oz (118.3 kg) 04/19/22 200 lb (90.7 kg)   08/05/21 233 lb (105.7 kg)        Code Status: Full Code     ADVANCED CARE PLANNING:  Patient has capacity for medical decisions: no  Health Care Power of : no  Living Will: no     Personal, Social, and Family History  Marital Status: single  Living situation: alone  Importance of jason/Muslim/spiritual beliefs: [] Very [] Somewhat [x] Not   Psychological Distress: mild  Does patient understand diagnosis/treatment? not asked  Does caregiver understand diagnosis/treatment? yes    Assessment        REVIEW OF SYSTEMS  Could not obtain as the patient is intubated and sedated    PHYSICAL ASSESSMENT:  Physical Exam  Vitals and nursing note reviewed. Constitutional:       Comments: Intubated, unresponsive off sedation   HENT:      Head: Normocephalic and atraumatic. Neck:      Comments: Aspen collar   Cardiovascular:      Rate and Rhythm: Tachycardia present. Rhythm irregular. Pulmonary:      Effort: No respiratory distress. Breath sounds: No wheezing. Comments: Mechanically ventilated  Abdominal:      Palpations: Abdomen is soft. Tenderness: There is no abdominal tenderness. Musculoskeletal:      Right lower leg: No edema. Left lower leg: No edema. Skin:     General: Skin is warm and dry. Neurological:      Comments: Intubated, unresponsive off sedation.           Palliative Performance Scale:  ___100% Full ambulation; normal activity/No disease; full self-care; normal intake; LOC full  ___90% full ambulation; normal activity/some disease; full self-care; normal intake; LOC full  ___80% ambulation full; normal activity with effort/some disease; full self-care; normal/reduced intake; LOC full  ___70% ambulation reduced; cannot do normal work/some disease; full self-care; normal/reduce intake; LOC full  ___60%  Ambulation reduced; Significant disease; Can't do hobbies/housework; intake normal or reduced; occasional assist; LOC full/confusion  ___50% Mainly sit/lie; Extensive disease; Can't do any work; Considerable assist; intake normal or reduced; LOC full/confusion  ___40%  Mainly in bed; Extensive disease; Mainly assist; intake normal or reduced; LOC full/confusion   ___30%  Bed Bound; Extensive disease; Total care; intake reduced; LOC full/confusion  _x_20%  Bed Bound; Extensive disease; Total care; intake minimal; Drowsy/coma  ___10%  Bed Bound; Extensive disease; Total care; Mouth care only; Drowsy/coma  ___0       Death      Principle Problem/Diagnosis:  Principal Problem:    History of cervical fracture  Active Problems:    Hypokalemia    Fall    Hypophosphatemia    Acute respiratory failure (HCC)    Anemia    Cervical spine fracture (HCC)    Blood alcohol level of 240 mg/100 ml or more  Resolved Problems:    * No resolved hospital problems. *      Please call with any palliative questions or concerns. Palliative Care Team is available via perfect serve or via phone. This note has been dictated by dragon, typing errors may be a possibility.   The total time I spent in seeing the patient, discussing goals of care, advanced directives, code status, greater than 50% time in counseling and other major issues was more than 60 minutes      Electronically signed by      Olga Keller MD  Hospice/Palliative Care Fellow  [place], [place]  4/28/2022 2:36 PM  Palliative care office: [phone]

## 2022-04-28 NOTE — PROGRESS NOTES
Pt has been alert and oriented *4 all night but slightly confused, combative, and pulling at equipment intermittely (which was mentioned in report/hsndoff), this RN is sitting outside pt room observing. Pt is easily re-oriented at times, but can is experiencing some ICU delirium at times.

## 2022-04-28 NOTE — CODE DOCUMENTATION
8001 Writer to bedside while primary RN went to get NRB d/t desat issues. Upon arrival to room pt satting in the 46s. SB noted on monitor- weak pulse present. Pt noted to be unresponsive at this time. Pt being bagged at this time. 0441 Pt lost pulses- CPR initiated. Pt being bagged at this time. Code blue called overhead. Pt hooked to life kain  0445 Pulse check- PEA. CPR resumed. Dr. Mohsen Quiñones at bedside to run code. 1 epi given. Pt being bagged at this time. 0446 1 epi given. Trauma team at bedside. 0447 Pulse check- PEA. CPR resumed. 0 Dr. Mohsen Quiñones 1st attempt w intubation- unsuccessful. Bagging resumed. 0449 Pulse check- PEA. CPR resumed. 1g Ca Cl given. 1 epi given. 1456 2nd look by Dr. Mohsen Quiñones. 0451 ETT placed- + color change, bilateral breath sounds. 80. Tube 28 cm at the lips. 4240 1 epi given. 1 amp HCO3 given. 0453 Pulse check. PEA- CPR resumed. 0454 1 amp HCO3 given.   0455 Pulse check- ST noted on monitor.  at bedside- family called and updated. Dr. Shannon Hong and Dr. Gautam Goel still at bedside to place lines and further medical mgmt.     Electronically signed by Benjamín Browne RN on 4/28/2022 at 5:17 AM

## 2022-04-28 NOTE — ADT AUTH CERT
Utilization Reviews         Cervical Diskectomy or Microdiskectomy, Foraminotomy, Laminotomy - Care Day 8 (4/26/2022) by Riri Lynn RN       Review Status Review Entered   Completed 4/27/2022 15:11      Criteria Review      Care Day: 8 Care Date: 4/26/2022 Level of Care: ICU    Guideline Day 1    Clinical Status    (X) * Successful uncomplicated procedure    ( ) * Respiration at baseline    ( ) * Voiding ability at baseline    ( ) * Neurologic status at baseline    ( ) * Hemodynamic stability    ( ) * Pain absent or managed    ( ) * Discharge plans and education understood    Activity    ( ) * Ambulatory or acceptable for next level of care    Routes    ( ) IV fluids, medications for procedure    4/27/2022 3:11 PM EDT by Blanca Barillas      see below    ( ) * Oral hydration    ( ) * Oral medications or regimen acceptable for next level of care    4/27/2022 3:11 PM EDT by Marsha Cleaning      Magnesium sulfate 2000 mg IV x 1  Magnesium sulfate 4000 mg IV x 1  Robaxin 750 mg po q 6 hr  phosphorus 2 tablets po bid  effer K 40 meq po x 1  seroquel 100 mg po bid  Desyrel 100 mg po hs  Precedex 20.7 ml/hr titrated x32  LR 30 ml/hr IV    4/27/2022 3:03 PM EDT by Blanca Barillas      lipitor 20 mg po hs  valium 10 mg po q 6 hr  valium 5 mg po x 1, increased to 10 mg  Lovenox 30 mg sc bid  pepcid 20 mg po bid  folvite 1 mg po qd  lasix 40 mg IV x 1  neurontin 300 mg po q 8 hr  motrin 400 mg po x 1  Ativan 2 mg IV x 1    ( ) * Oral diet or acceptable for next level of care    Medications    ( ) Oral analgesics    4/27/2022 3:03 PM EDT by Blanca Barillas      tylenol 1000 mg po q 8 hr    * Milestone   Additional Notes   DATE: 4/27/2022         Pertinent Updates:   Kelley Webster was weaned off the precedex yesterday afternoon, however overnight got tachycardic and hypertension with acute agitation. Received ativan and was restarted on precedex, with some hypotension that is currently resolving.  Did not tolerate CPAP overnight, was on HFNC.      Vitals: Temp 99.7  RR 22    /91  HHFNC FiO2 40  O2 40L/min         Abnl/Pertinent Labs:      Potassium: 3.3 (L)   BUN,BUNPL: 5 (L)   Creatinine: 0.42 (L)   Anion Gap: 20 (H)   Magnesium: 2.7 (H)   GLUCOSE, FASTING,GF: 135 (H)   Troponin, High Sensitivity: 43 (H)   RBC: 3.64 (L)   Hemoglobin Quant: 11.2 (L)   Hematocrit: 34.2 (L)   RDW: 21.6 (H)   NRBC Automated: 0.3 (H)            Physical Exam:   CONSTITUTIONAL: GCS 15, conversing appropriately, following commands   HEENT: PERRLA   LUNGS: clear bilaterally   CV: HRRR   GI: abdomen soft and non tender   MUSCULOSKELETAL: intact   NEUROLOGIC: GCS 15, following commands, moving all 4 extremities spontaneously   SKIN: intact          MD Consults/Assessments & Plans:          HD: # 7       ASSESSMENT       Patient Active Problem List   Diagnosis   · Insomnia   · Moderate persistent asthma with acute exacerbation   · Mixed hyperlipidemia   · Adverse drug reaction   · Liver disease, chronic, due to alcohol (HCC)   · COPD (chronic obstructive pulmonary disease) with chronic bronchitis (HCC)   · Obstructive sleep apnea   · Paroxysmal SVT (supraventricular tachycardia) (HCC)   · Mild persistent asthma without complication   · Dysphagia   · History of cervical fracture   · Closed fracture of cervical vertebra (HCC)   · Cervical radiculopathy   · Hypokalemia   · Fall   · Hypophosphatemia   · Acute respiratory failure (HCC)   · Blood alcohol level of 240 mg/100 ml or more   · Anemia   · Cervical spine fracture (HCC)           MEDICAL DECISION MAKING AND PLAN       Neuro:   - GCS 15, following commands, conversing appropriately   - Pain/sedation: Precedex, valium 5mg q6, MMT, fentanyl 50 q2 prn, markie 5mg q6 prn, seroquel 100mg nightly, wean precedex today   - Daily ETOH use   - Thiamine and folic acid    - CTH neg for acute injuries   - CT C spine showing C6-7 fx, MRI C spine showing epidural hemorrhage at C6-7 amd prevertebral soft tissue swelling          - MRA neck showing no significant stenosis         - Intubated due to stridor, DL showing some airway edema         - POD#7 ORIF C6-7 by neurosurgery         - SBP <140 per nsg       CV   - HR    - MAP , no pressor requirements   - Hydralazine 10mg q6 prn for SBP goal <140 per nsg   - Troponin 21, EKG stable on admission   -    - No significant cardiac hx on chart       Pulm   - Extubated 4/24   - SpO2 93-98% on 2L NC, CPAP or HFNC overnight    - CXR showing pulmonary edema         - Lasix given with good response, will attempt again today   - No hx smoking or home O2 requirements       GI/Nutrition   - Diet tube feeds goal 65 ml/hr, currently npo as patient pulled out NGT   - Failed formal swallow study   - IR to place NGT today   - Ppx: protonix, glycolax   - CT AP showing no acute traumatic injuries, diverticulosis with jejunal intussusception, small fat containing umbilical hernia       Renal/lytes   - BUN/Cr 5/0.42   - Na/K 142/3.3, K replaced   - Mg/P 2.7/4.0   - Total fluid cap 75 ml/hr, LR maintenance   - I/Os 1801/5330 in last 24h, 1.8 cc/kg/hr   - 10L pos, lasix 40mg given with good response       Heme   - Hb 11.2 (11.3)   - Plt 246 (276)   - DVT ppx lovenox       7.   Endocrine         - -144         - Continue to monitor, no known hx DM       Micro   - Tmax 37.5   - WBC  6.5 (7.7)   - Monitor off antimicrobials       Family/dispo   - Wean precedex   - PT/OT   - Ok for stepdown if able to wean off precedex       Lines   - LILIANE   - Libra

## 2022-04-28 NOTE — PROGRESS NOTES
Pt slightly tachycardiac 140-150's at this time (confirmed sinus tach with EKG monitor) due to agitation and restlessness, will continue to monitor and recheck pt HR frequently.  Will allow a half hour for pt to calm down

## 2022-04-28 NOTE — PROGRESS NOTES
Pt using yaunker all night to suction himself, NT suctioning when needed and appropriate, pt O2 sats maintaining on 4L NC. Will continue to monitor.

## 2022-04-28 NOTE — PLAN OF CARE
Problem: Confusion - Acute:  Goal: Absence of continued neurological deterioration signs and symptoms  Description: Absence of continued neurological deterioration signs and symptoms  4/28/2022 0856 by Melisa Napier RN  Outcome: Progressing  4/28/2022 0856 by Melisa Napier RN  Outcome: Progressing  4/27/2022 1950 by Cecilia Silvestre RN  Outcome: Progressing  Goal: Mental status will be restored to baseline  Description: Mental status will be restored to baseline  4/28/2022 0856 by Melisa Napier RN  Outcome: Progressing  4/28/2022 0856 by Melisa Napier RN  Outcome: Progressing  4/27/2022 1950 by Cecilia Silvestre RN  Outcome: Progressing     Problem: Discharge Planning:  Goal: Ability to perform activities of daily living will improve  Description: Ability to perform activities of daily living will improve  4/28/2022 0856 by Melisa Napier RN  Outcome: Progressing  4/28/2022 0856 by Melisa Napier RN  Outcome: Progressing  4/27/2022 1950 by Cecilia Silvestre RN  Outcome: Progressing  Goal: Participates in care planning  Description: Participates in care planning  4/28/2022 0856 by Melisa Napier RN  Outcome: Progressing  4/28/2022 0856 by Melisa Napier RN  Outcome: Progressing  4/27/2022 1950 by Cecilia Silvestre RN  Outcome: Progressing     Problem: Injury - Risk of, Physical Injury:  Goal: Absence of physical injury  Description: Absence of physical injury  4/28/2022 0856 by Melisa Napier RN  Outcome: Progressing  4/28/2022 0856 by Melisa Napier RN  Outcome: Progressing  4/27/2022 1950 by Cecilia Silvestre RN  Outcome: Progressing  Goal: Will remain free from falls  Description: Will remain free from falls  4/28/2022 0856 by Melisa Napier RN  Outcome: Progressing  4/28/2022 0856 by Melisa Napier RN  Outcome: Progressing  4/27/2022 1950 by Cecilia Silvestre RN  Outcome: Progressing     Problem: Mood - Altered:  Goal: Mood stable  Description: Mood stable  4/28/2022 0856 by Melisa Napier RN  Outcome: Progressing  4/28/2022 0856 by Belen Abreu RN  Outcome: Progressing  4/27/2022 1950 by Reyes Chávez RN  Outcome: Progressing  Goal: Absence of abusive behavior  Description: Absence of abusive behavior  4/28/2022 0856 by Belen Abreu RN  Outcome: Progressing  4/28/2022 0856 by Belen Abreu RN  Outcome: Progressing  4/27/2022 1950 by Reyes Chávez RN  Outcome: Progressing  Goal: Verbalizations of feeling emotionally comfortable while being cared for will increase  Description: Verbalizations of feeling emotionally comfortable while being cared for will increase  4/28/2022 0856 by Belen Abreu RN  Outcome: Progressing  4/28/2022 0856 by Belen Abreu RN  Outcome: Progressing  4/27/2022 1950 by Reyes Chávez RN  Outcome: Progressing     Problem: Psychomotor Activity - Altered:  Goal: Absence of psychomotor disturbance signs and symptoms  Description: Absence of psychomotor disturbance signs and symptoms  4/28/2022 0856 by Belen Abreu RN  Outcome: Progressing  4/28/2022 0856 by Belen Abreu RN  Outcome: Progressing  4/27/2022 1950 by Reyes Chávez RN  Outcome: Progressing     Problem: Sensory Perception - Impaired:  Goal: Demonstrations of improved sensory functioning will increase  Description: Demonstrations of improved sensory functioning will increase  4/28/2022 0856 by Belen Abreu RN  Outcome: Progressing  4/28/2022 0856 by Belen Abreu RN  Outcome: Progressing  4/27/2022 1950 by Reyes Chávez RN  Outcome: Progressing  Goal: Decrease in sensory misperception frequency  Description: Decrease in sensory misperception frequency  4/28/2022 0856 by Belen Abreu RN  Outcome: Progressing  4/28/2022 0856 by Beeln Abreu RN  Outcome: Progressing  4/27/2022 1950 by Reyes Chávez RN  Outcome: Progressing  Goal: Able to refrain from responding to false sensory perceptions  Description: Able to refrain from responding to false sensory perceptions  4/28/2022 0856 by Belen Abreu RN  Outcome: Progressing  4/28/2022 0856 by Wing Nava RN  Outcome: Progressing  4/27/2022 1950 by Reyna Carrizales RN  Outcome: Progressing  Goal: Demonstrates accurate environmental perceptions  Description: Demonstrates accurate environmental perceptions  4/28/2022 0856 by Wing Nava RN  Outcome: Progressing  4/28/2022 0856 by Wing Nava RN  Outcome: Progressing  4/27/2022 1950 by Reyna Carrizales RN  Outcome: Progressing  Goal: Able to distinguish between reality-based and nonreality-based thinking  Description: Able to distinguish between reality-based and nonreality-based thinking  4/28/2022 0856 by Wing Nava RN  Outcome: Progressing  4/28/2022 0856 by Wing Nava RN  Outcome: Progressing  4/27/2022 1950 by Reyna Carrizales RN  Outcome: Progressing  Goal: Able to interrupt nonreality-based thinking  Description: Able to interrupt nonreality-based thinking  4/28/2022 0856 by Wing Nava RN  Outcome: Progressing  4/28/2022 0856 by Wing Nava RN  Outcome: Progressing  4/27/2022 1950 by Reyna Carrizales RN  Outcome: Progressing     Problem: Sleep Pattern Disturbance:  Goal: Appears well-rested  Description: Appears well-rested  4/28/2022 0856 by Wing Nava RN  Outcome: Progressing  4/28/2022 0856 by Wing Nava RN  Outcome: Progressing  4/27/2022 1950 by Reyna Carrizales RN  Outcome: Progressing     Problem: Skin Integrity:  Goal: Will show no infection signs and symptoms  Description: Will show no infection signs and symptoms  4/28/2022 0856 by Wing Nava RN  Outcome: Progressing  4/28/2022 0856 by Wing Nava RN  Outcome: Progressing  4/27/2022 1950 by Reyna Carrizales RN  Outcome: Progressing  Goal: Absence of new skin breakdown  Description: Absence of new skin breakdown  4/28/2022 0856 by Wing Nava RN  Outcome: Progressing  4/28/2022 0856 by Wing Nava RN  Outcome: Progressing  4/27/2022 1950 by Reyna Carrizales RN  Outcome: Progressing     Problem: Falls - Risk of:  Goal: Absence of physical injury  Description: Absence of physical injury  4/28/2022 0856 by Anjana Coelho RN  Outcome: Progressing  4/28/2022 0856 by Anjana Coelho RN  Outcome: Progressing  4/27/2022 1950 by Ana Lilia Mills RN  Outcome: Progressing  Goal: Will remain free from falls  Description: Will remain free from falls  4/28/2022 0856 by Anjana Coelho RN  Outcome: Progressing  4/28/2022 0856 by Anjana Coelho RN  Outcome: Progressing  4/27/2022 1950 by Ana Lilia Mills RN  Outcome: Progressing     Problem: Discharge Planning  Goal: Discharge to home or other facility with appropriate resources  4/28/2022 0856 by Anjana Coelho RN  Outcome: Progressing  4/28/2022 0856 by Anjana Coelho RN  Outcome: Progressing  4/27/2022 1950 by Ana Lilia Mills RN  Outcome: Progressing     Problem: Pain  Goal: Verbalizes/displays adequate comfort level or baseline comfort level  4/28/2022 0856 by Anjana Coelho RN  Outcome: Progressing  4/28/2022 0856 by Anjana Coelho RN  Outcome: Progressing  4/27/2022 1950 by Ana Lilia Mills RN  Outcome: Progressing     Problem: Nutrition Deficit:  Goal: Optimize nutritional status  4/28/2022 0856 by Anjana Coelho RN  Outcome: Progressing  4/28/2022 0856 by Anjana Coelho RN  Outcome: Progressing  4/27/2022 1950 by Ana Lilia Mills RN  Outcome: Progressing     Problem: ABCDS Injury Assessment  Goal: Absence of physical injury  4/28/2022 0856 by Anjana Coelho RN  Outcome: Progressing  4/28/2022 0856 by Anjana Coelho RN  Outcome: Progressing  4/27/2022 1950 by Ana Lilia Mills RN  Outcome: Progressing

## 2022-04-28 NOTE — SIGNIFICANT EVENT
Responded to a code blue that was called overhead. On arrival to the room, chest compression were already underway and the pt was in a PEA rhythm. On the third pulse check an attempt was made to intubate the pt, however it unsuccessful secondary to equipment issues. Another round of EPI was given and a second attempt at intubation was made. Second attempt was successful. Pt was intubated with an 8.0 tube and secured at 28 at the lip. There were bilateral breath sounds, color change with slow steady rise in O2 saturation. At the completion of the next round of CPR pt obtained ROSC. In total 4 rounds of Epi, 1bicarb and 8 rounds of CPR were used. Attending team was in room and the post ROSC care was left to the Trauma team and their attending physician.

## 2022-04-28 NOTE — PROCEDURES
Central Line Procedure Note      Patient Name: Brenda Banegas   : 1955  DATE: 2022    Procedure Name: Sherry Elizondo guided right femoral Central Line Placement    Supervised By: Dr. Waynetta Crigler was available throughout the procedure    Performed by: Chris Caballero PGY-1    Assistant:  None    Pre Op Diagnosis: Need for vasopressors    Post Op Diagnosis: Same as Pre-Op    Anesthesia: None    EBL: <89 ml    Complications: None    DISCUSSION:  Brenda Banegas is a 75y.o.-year-old male who requires additional IV access and central pressure monitoring. The history and physical examination were reviewed and confirmed. Implied consent was obtained due to critical nature of event. PROCEDURE:  A timeout was initiated by the bedside nurse and was confirmed by those present. The patient was placed in a supine position. The skin overlying the Right Femoral Vein was prepped with chlorhexadine and draped in sterile fashion. The skin was infiltrated with local anesthetic. Through the anesthetized region and under ultrasound guidance, the introducer needle was inserted into the femoral vein returning dark red non pulsatile blood. A guidewire was placed through the center of the needle with no resistance. A small incision made in the skin with a #11 scalpel blade. The dilator was inserted into the skin and vein over guidewire using Seldinger technique. The dilator was then removed and the catheter was placed in the vein over the guidewire using Seldinger technique. The guidewire was then removed and all ports aspirated and flushed appropriately. The catheter then secured using silk suture and a temporary sterile dressing was applied. No immediate complication was evident. All sponge, instrument and needle counts were correct at the completion of the procedure. The patient tolerated the procedure well with no immediate complication evident.        Chris Caballero DO  22, 6:10 AM              Trauma Attending Attestation      I have reviewed the above [facility] note(s) and confirmed the key elements of the medical history and physical exam. I have seen and examined the pt. I have discussed the findings, established the care plan and recommendations with Resident, [facility] RN, bedside nurse.         Gerardo Tesfaye DO  4/28/2022  6:32 AM

## 2022-04-28 NOTE — CONSULTS
Chief Complaint: Left eye pain    History of Present Illness: Can is a 64 year old male who presents to the urgent care walk in clinic for left eye pain. Patient stuck his left eye in a stir stick at [facility] on  3/15/2019, when he turned his head to get his drink. He does wear corrective lenses, but he wears them down and his nose. 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 He developed redness intiially at the time of the injury. Increasing redness after the incident, that has been stable since yesterday. Reports left-sided headache which she states is new to him.    Current Outpatient Medications   Medication Sig   • amiodarone (PACERONE,CORDARONE) 200 MG tablet TAKE 1 TABLET BY MOUTH DAILY   • metoPROLOL tartrate (LOPRESSOR) 25 MG tablet TAKE 1/2 TABLET BY MOUTH EVERY 12 HOURS   • atorvastatin (LIPITOR) 80 MG tablet TAKE 1 TABLET BY MOUTH EVERY NIGHT AT BEDTIME   • doxazosin (CARDURA) 2 MG tablet Take 1 tablet by mouth nightly.   • omeprazole (PRILOSEC) 40 MG capsule TAKE 1 CAPSULE BY MOUTH TWICE DAILY   • acetaminophen (TYLENOL) 500 MG tablet Take 500 mg by mouth every 4 hours as needed for Fever or Pain.   • NON FORMULARY Apply 1 drop to eye daily as needed (BID to Rt. eye;). Silophtho drops;Keep false right Eye moist;   • albuterol (PROAIR HFA) 108 (90 Base) MCG/ACT inhaler Inhale 2 puffs into the lungs every 4 hours as needed for Shortness of Breath or Wheezing.     No current facility-administered medications for this visit.        ALLERGIES:   Allergen Reactions   • Aspirin NAUSEA     \"Bad stomach pains\"   • Tramadol Other (See Comments)     Made me sick   • Unknown Other (See Comments)     Skin turns very red and the skin falls off around the anus. DAIRY ALLERGY       Past Medical History:   Diagnosis Date   • Blind right eye 1967    enucliation secondary to injury/plastic eye   • Bronchitis    • Chronic kidney disease 2009    kiney stones x3   • COPD (chronic  Neuro Critical Care Consult Note    Reason for Consult:  Post-arrest neurological prognostication  Requesting Physician:  Dr. Sherri Naik  Attending Physician: Dr. Shanna Mojica    History Obtained From:  [facility] record    CHIEF COMPLAINT:       GCS 3 s/p PEA arrest    HISTORY OF PRESENT ILLNESS:       The patient is a 75 y.o. male admitted to the trauma service on 4/19/2022 after sustaining C6-C7 fracture following a mechanical fall down 15 steps while intoxicated. Patient underwent ORIF, discectomy and corpectomy the same day. Hospital course complicated by alcohol withdrawal, as well as postoperative spinal epidural hemorrhage and soft tissue swelling, but no significant stenosis. Patient was extubated on 4/24 and has been awake, alert and interactive since that time. Early this morning, patient became progressively more confused, combative and hypoxic, requiring supplemental oxygen. Patient then became tachycardic into the 140s and 150s then became increasingly more bradycardic and hypoxic into the 50s until he went into PEA arrest at 4:41 AM.  Patient underwent 8 rounds of CPR, requiring 4 rounds of epinephrine and 1 amp of bicarb. Patient was intubated successfully on the second attempt following 10 minutes of CPR. ROSC was regained at 4:55 AM.  Since that time, patient has been a GCS of 3T. Has had additional atrial fibrillation with RVR and is currently on amiodarone drip at 1 mg/min. Patient also on pressors, phenylephrine at 100 mics per minute.          PAST MEDICAL HISTORY :       Past Medical History:        Diagnosis Date    Asthma     Calculus of gallbladder without mention of cholecystitis or obstruction     Chronic back pain     Hyperlipidemia     Insomnia, unspecified     Spinal stenosis, unspecified region other than cervical     Urinary incontinence      Past Surgical History:        Procedure Laterality Date    CERVICAL FUSION N/A 2022    C7 CORPECTOMY performed by Arben Clemente DO at [facility] FLX DX W/COLLJ SPEC WHEN PFRMD N/A 2018    COLONOSCOPY performed by Akshat Hughes MD at [address] ENDOSCOPY  2018    EGD BIOPSY performed by Akshat Hughes MD at [facility] History:   Social History     Socioeconomic History    Marital status:      Spouse name: Not on file    Number of children: Not on file    Years of education: Not on file    Highest education level: Not on file   Occupational History    Not on file   Tobacco Use    Smoking status: Former Smoker     Packs/day: 1.00     Years: 24.00     Pack years: 24.00     Types: Cigarettes     Quit date:      Years since quittin.3    Smokeless tobacco: Never Used   Vaping Use    Vaping Use: Never used   Substance and Sexual Activity    Alcohol use: Yes     Comment: daily drinker (vodka)    Drug use: No    Sexual activity: Not on file   Other Topics Concern    Not on file   Social History Narrative    Not on file     Social Determinants of Health     Financial Resource Strain:     Difficulty of Paying Living Expenses: Not on file   Food Insecurity:     Worried About [address] in the Last Year: Not on file    [address] in the Last Year: Not on file   Transportation Needs:     Lack of Transportation (Medical): Not on file    Lack of Transportation (Non-Medical):  Not on file   Physical Activity:     Days of Exercise per Week: Not on file    Minutes of Exercise per Session: Not on file   Stress:     Feeling of Stress : Not on file   Social Connections:     Frequency of Communication with Friends and Family: Not on file    Frequency of Social Gatherings with Friends and Family: Not on file    Attends Sabianist Services: Not on file    Active Member of Clubs or Organizations: Not on file    Attends Club or Organization Meetings: obstructive pulmonary disease) (CMS/HCC)    • Gastroesophageal reflux disease    • S/P CABG x 3 5/29/2018   • Sinusitis, chronic    • Urinary tract infection    • Wears dentures     upper   • Wears prescription eyeglasses        Past Surgical History:   Procedure Laterality Date   • Colonoscopy  2003;2010;7/2015    polypectomy   • Colonoscopy  7/15/16    Dr. Nguyen- 2 adenoma polyps, repeat 3 yrs   • Colonoscopy diagnostic  05/24/2018    gastritis   • Cystoscopy,dil urethral stricture  12/20/2016    Cystoscopy/urethral dilitation by Dr. Wisdom at [facility]   • Cystourethroscopy /lithotripsy  2009    Kidney stone removal x3   • Esophagogastroduodenoscopy  7/14/2015    gastritis   • Esophagogastroduodenoscopy  12/16/2016    at [facility]   • Eye surgery Right 1967    removal eye;due to injury   • Inguinal hernia repair Bilateral 02/14/2017    Laparoscopic TEPP; bilateral inguinal hernia repair, [place], progrip   • Urethroplasty,excis dist urethra  12/2009       Physical Exam:  Vitals:    03/17/19 1253   BP: 124/68   Pulse: 54   Resp: 16   Temp: 98.1 °F (36.7 °C)   TempSrc: Oral   SpO2: 97%   Weight: 87 kg   Height: 5' 8\" (1.727 m)   Visual acuity reviewed.    General appearance:  Healthy, alert, in no distress, cooperative and smiling. Patient answers questions appropriately in full sentences.  Head: Atraumatic, normocephalic.  Eyes: Left eye: PERRL (Pupils equal, round, reactive to light). EOMs (extraocular motion intact) Sclerae anicteric. Cornea appears clear. Focally injected in the medial conjunctiva, with subconjunctival hematoma. Fluorescein staining reveals uptake at the edge of the medial cornea approximately 9:00. No other uptake.    Tdap updated.    Assessment: Subconjunctival hemorrhage secondary to trauma with minimal abrasion at the edge of the cornea.    Plan:  1. Moxifloxacin: 1 drop into left eye 3 times per day for 7 days.  2. Urgent evaluation with ophthalmology with worsening symptoms: Eye  Not on file    Marital Status: Not on file   Intimate Partner Violence:     Fear of Current or Ex-Partner: Not on file    Emotionally Abused: Not on file    Physically Abused: Not on file    Sexually Abused: Not on file   Housing Stability:     Unable to Pay for Housing in the Last Year: Not on file    Number of [place] in the Last Year: Not on file    Unstable Housing in the Last Year: Not on file       Family History:   History reviewed. No pertinent family history. Allergies:  Seasonal and Cat hair extract    Home Medications:  Prior to Admission medications    Medication Sig Start Date End Date Taking? Authorizing Provider   traZODone (DESYREL) 150 MG tablet take 1 tablet by mouth at bedtime 4/12/22   Zaynab Clement MD   pantoprazole (PROTONIX) 40 MG tablet Take 1 tablet by mouth daily 2/22/22   Servando Shen MD   pantoprazole (PROTONIX) 40 MG tablet take 1 tablet by mouth every morning before breakfast 2/21/22   Wale Mcgill MD   atorvastatin (LIPITOR) 20 MG tablet Take 1 tablet by mouth daily 2/21/22   Wale Mcgill MD   albuterol sulfate  (90 Base) MCG/ACT inhaler inhale 1 puff by mouth and INTO THE LUNGS every 4 hours if needed for wheezing 2/1/22   Zaynab Clement MD   montelukast (SINGULAIR) 10 MG tablet Take 1 tablet by mouth nightly 1/5/22   Zaynab Clement MD   BREO ELLIPTA 200-25 MCG/INH AEPB inhaler Inhale 1 puff into the lungs daily 1/5/22   Zayanb Clement MD   azelastine (ASTELIN) 0.1 % nasal spray 1 spray by Nasal route 2 times daily Use in each nostril as directed 1/5/22   Zaynab Clement MD   clobetasol (TEMOVATE) 0.05 % ointment Apply topically 2 times daily.  8/5/21   Zaynab Clement MD   clindamycin (CLEOCIN) 150 MG capsule take 1 capsule by mouth three times a day  Patient not taking: Reported on 8/5/2021 5/21/21   Historical Provider, MD   ibuprofen (ADVIL;MOTRIN) 800 MG tablet take 1 tablet by mouth three times a day  Patient not taking: Reported on 8/5/2021 pain, vision changes, headache.  3. Otherwise follow-up with ophthalmology for evaluation of eyes in the next 2 weeks.    Dr. Reynaldo Aponte MD is attending physician and available by consultation on an as needed basis.     5/21/21   Historical Provider, MD   penicillin v potassium (VEETID) 500 MG tablet take 1 tablet by mouth three times a day  Patient not taking: Reported on 8/5/2021 5/1/21   Historical Provider, MD   PREVIDENT 5000 ENAMEL PROTECT 1.1-5 % PSTE APPLY TO TEETH,SWISH FOR 30 SECONDS THEN EXPECTORATE ONCE DAILY DO NOT EAT OR DRINK FOR 30 MINUTES 6/6/21   Historical Provider, MD   nystatin (MYCOSTATIN) 558958 UNIT/ML suspension Take 5 mLs by mouth 4 times daily 9/19/18   Ayla Moore MD   ibuprofen (ADVIL;MOTRIN) 400 MG tablet Take 400 mg by mouth every 6 hours as needed for Pain.     Historical Provider, MD       Current Medications:   Current Facility-Administered Medications: norepinephrine (LEVOPHED) 16 mg in sodium chloride 0.9 % 250 mL infusion, 1-100 mcg/min, IntraVENous, Continuous  chlorhexidine (PERIDEX) 0.12 % solution 15 mL, 15 mL, Mouth/Throat, BID  famotidine (PEPCID) 20 mg in sodium chloride (PF) 10 mL injection, 20 mg, IntraVENous, BID  vasopressin 20 Units in dextrose 5 % 100 mL infusion, 0.04 Units/min, IntraVENous, Continuous  fentaNYL 50 mcg/mL 50 mL infusion,  mcg/hr, IntraVENous, Continuous  [COMPLETED] amiodarone (CORDARONE) 150 mg in dextrose 5 % 100 mL bolus, 150 mg, IntraVENous, Once **FOLLOWED BY** amiodarone (CORDARONE) 450 mg in dextrose 5 % 250 mL infusion, 1 mg/min, IntraVENous, Continuous **FOLLOWED BY** amiodarone (CORDARONE) 450 mg in dextrose 5 % 250 mL infusion, 0.5 mg/min, IntraVENous, Continuous  propofol injection, 5-30 mcg/kg/min, IntraVENous, Continuous  insulin lispro (HUMALOG) injection vial 0-18 Units, 0-18 Units, SubCUTAneous, Q4H  dextrose 50 % IV solution, 12.5 g, IntraVENous, PRN  glucagon (rDNA) injection 1 mg, 1 mg, IntraMUSCular, PRN  dextrose 5 % solution, 100 mL/hr, IntraVENous, PRN  heparin 25,000 units in 0.9% sodium chloride 250 mL infusion, 5-30 Units/kg/hr, IntraVENous, Continuous  insulin regular (HUMULIN R;NOVOLIN R) 100 Units in sodium chloride 0.9 % 100 mL infusion, 1-50 Units/hr, IntraVENous, Continuous  metoprolol (LOPRESSOR) injection 5 mg, 5 mg, IntraVENous, Once  phenylephrine (SONALI-SYNEPHRINE) 50 mg in dextrose 5 % 250 mL infusion,  mcg/min, IntraVENous, Continuous  oxyCODONE (ROXICODONE) immediate release tablet 2.5 mg, 2.5 mg, Oral, Q6H PRN  albuterol sulfate  (90 Base) MCG/ACT inhaler 2 puff, 2 puff, Inhalation, Q4H PRN  benzocaine-menthol (DERMOPLAST) 20-0.5 % spray, , Topical, PRN  acetaminophen (TYLENOL) tablet 1,000 mg, 1,000 mg, Oral, V8L  folic acid (FOLVITE) tablet 1 mg, 1 mg, Oral, Daily  polyethylene glycol (GLYCOLAX) packet 17 g, 17 g, Oral, Daily  ondansetron (ZOFRAN-ODT) disintegrating tablet 4 mg, 4 mg, Oral, Q8H PRN **OR** ondansetron (ZOFRAN) injection 4 mg, 4 mg, IntraVENous, Q6H PRN  lactated ringers infusion, , IntraVENous, Continuous  atorvastatin (LIPITOR) tablet 20 mg, 20 mg, Oral, Nightly  hydrALAZINE (APRESOLINE) injection 10 mg, 10 mg, IntraVENous, Q6H PRN    REVIEW OF SYSTEMS:       Unable    PHYSICAL EXAM:     /76   Pulse 109   Temp 98.6 °F (37 °C) (Axillary)   Resp (!) 31   Ht 6' 2\" (1.88 m)   Wt 260 lb 12.9 oz (118.3 kg)   SpO2 95%   BMI 33.49 kg/m²     PHYSICAL EXAM:  CONSTITUTIONAL:  Critically ill-appearing, nonresponsive   HEAD:  normocephalic, atraumatic    EYES:  PERRLA   ENT:  moist mucous membranes   NECK:  supple, symmetric   LUNGS:  Equal air entry bilaterally   CARDIOVASCULAR:  normal s1 / s2, RRR, distal pulses intact   ABDOMEN:  Soft, no rigidity   NEUROLOGIC:  Mental Status:  comatose             Cranial Nerves:               Pupils 4mm and reactive bilaterally, corneal reflexes present, no cough or gag.  Jerking movements of facial muscles B/L    Motor Exam:    All limbs flaccid        Deep Tendon Reflexes:    Right Bicep:  0  Left Bicep:  0  Right Knee:  0  Left Knee:  0    Plantar Response:  Right:  no response  Left:  no response                 LABS AND IMAGING:     RECENT LABS:  CBC with Differential:    Lab Results   Component Value Date    WBC 16.5 04/28/2022    RBC 3.51 04/28/2022    RBC 4.82 02/09/2012    HGB 10.5 04/28/2022    HCT 34.2 04/28/2022     04/28/2022     02/09/2012    MCV 97.4 04/28/2022    MCH 29.9 04/28/2022    MCHC 30.7 04/28/2022    RDW 22.4 04/28/2022    LYMPHOPCT 22 04/28/2022    MONOPCT 5 04/28/2022    BASOPCT 0 04/28/2022    MONOSABS 0.67 04/28/2022    LYMPHSABS 2.93 04/28/2022    EOSABS 0.00 04/28/2022    BASOSABS 0.00 04/28/2022    DIFFTYPE NOT REPORTED 02/27/2017     BMP:    Lab Results   Component Value Date     04/28/2022    K 3.2 04/28/2022     04/28/2022    CO2 28 04/28/2022    BUN 18 04/28/2022    LABALBU 4.1 04/19/2022    LABALBU 5.0 02/09/2012    CREATININE 0.65 04/28/2022    CALCIUM 9.3 04/28/2022    GFRAA >60 04/28/2022    LABGLOM >60 04/28/2022    GLUCOSE 262 04/28/2022    GLUCOSE 110 02/09/2012       RADIOLOGY:  XR CHEST PORTABLE   Preliminary Result   New hazy density within the left upper lobe may represent atelectasis. New mild left chest wall subcutaneous emphysema. No gross pneumothorax or   rib fracture is identified. New endotracheal tube located 2.7 cm above the lisa. XR CHEST PORTABLE   Final Result   Low lung volumes with basilar opacities favoring atelectasis. Small left   effusion. Interval appearance of resolving vascular congestion. XR ABDOMEN FOR NG/OG/NE TUBE PLACEMENT   Final Result   Nasogastric tip is in the stomach         XR CHEST PORTABLE   Final Result   Mild bilateral congestion. XR ABDOMEN FOR NG/OG/NE TUBE PLACEMENT   Final Result   Enteric tube and proximal side port terminate within the stomach. XR CERVICAL SPINE (2-3 VIEWS)   Final Result   Anterior cervical disc fusion C6 through T1. Torticollis. Detail/sensitivity obscured due to positioning and collar. FL MODIFIED BARIUM SWALLOW W VIDEO   Final Result   Aspiration of thin liquid. Laryngeal penetration of nectar thick liquid and   puree. Other consistencies not tested. Please see separate speech pathology report for full discussion of findings   and recommendations. XR CHEST PORTABLE   Final Result   Subsegmental atelectasis right lung base with elevation of the hemidiaphragm. XR SHOULDER LEFT (MIN 2 VIEWS)   Final Result   1. No acute osseous abnormality. 2. Mild AC joint osteoarthritis. 3. Follow-up imaging recommended if pain persists or worsens following   conservative management. XR CHEST PORTABLE   Final Result   Low lung volumes with associated bronchovascular crowding, mild diffuse   pulmonary edema and continued right basilar atelectasis with or without   superimposed consolidation. Correlate with concerns for underlying mucous plugging. XR CHEST PORTABLE   Final Result   High-riding ETT persist.  Exact tube position difficult to assess due to   overlying hardware. Elevated right hemidiaphragm with basilar atelectasis. XR CHEST PORTABLE   Final Result   The ET tube was 8.5 cm above the lisa. The NG tube was past the GE   junction. Unchanged discoid atelectasis was noted in the right middle lobe. No cardiomegaly, interstitial edema or pneumothorax. XR ABDOMEN FOR NG/OG/NE TUBE PLACEMENT   Final Result   1. The NG tube side port is noted at the GE junction. Recommend advancement   of the tube 5 cm for more optimal positioning. XR CHEST PORTABLE   Final Result   1. The endotracheal tube tip is located 9.7 cm above the lisa. This could   be advanced approximately 3-4 cm for more optimal placement. 2. Worsening atelectasis in the right lung base. 3. The orogastric tube tip now terminates below the diaphragm, incompletely   imaged. XR ABDOMEN FOR NG/OG/NE TUBE PLACEMENT   Final Result   Enteric tube in expected position.          FLUORO FOR SURGICAL PROCEDURES   Final Result      FLUORO FOR SURGICAL PROCEDURES   Final Result      MRI CERVICAL SPINE WO CONTRAST   Final Result   1. Acute traumatic injury at the C6-C7 level with right-sided facet   fractures, torn ligamentum flavum and posterior longitudinal ligament, and   possibly torn anterior longitudinal ligament versus osteophyte fracture. No   significant spondylolisthesis. 2. Epidural hemorrhage centered the C6-C7 level results in severe thecal sac   stenosis. No convincing abnormal spinal cord signal.   3. Cervicothoracic prevertebral soft tissue edema/fluid present, measuring up   to 0.6 cm in thickness. 4. Posterior paraspinal soft tissue edema present in the cervical levels,   including in the interspinous spaces. Preliminary findings discussed with ordering clinician Dr. Francesco Hayden on   04/19/2022 at 3:26 p.m. MRA NECK WO CONTRAST   Final Result   Within limitations of motion degradation, the major arteries of the neck   appear grossly patent without significant stenosis. If clinical concerns   persist, can consider correlation with contrast enhanced CT neck. XR CHEST PORTABLE   Final Result   No acute cardiopulmonary process. Endotracheal tube in satisfactory position. Gastric tube with the tip in the mid esophagus and repositioning is   recommended. XR ACUTE ABD SERIES CHEST 1 VW   Final Result   No acute cardiopulmonary process. Nonobstructive bowel gas pattern. XR SHOULDER RIGHT (MIN 2 VIEWS)   Final Result   1. Moderate right glenohumeral osteoarthrosis. 2. Mild degenerative changes of the right AC joint. No acute fracture or   dislocation. Labs and Images reviewed with:    [] Germain Tavera MD    [] Les Sevilla MD  [] Blanca Galindo MD  --[] there are no new interval images to review.      ASSESSMENT AND PLAN:         ASSESSMENT:     This is a 75 y.o. male with GCS of 3, loss of muscle tone, possible myoclonic jerking versus seizures status post ROSC following respiratory arrest resulting in PEA arrest.    Patient care will be discussed with attending, will reevaluate patient along with attending. PLAN/MEDICAL DECISION MAKING:    NEUROLOGIC:  -Obtain stat CT head now that patient is hemodynamically stable  - Start LTME to determine seizures vs myoclonic jerking  - MRI at 72-hours post ROSC  - Discussed plan with family at bedside    All other medical management per primary team      DISPOSITION:  [x] To remain ICU    We will continue to follow along. For any changes in exam or patient status please contact Neuro Critical Care.       Francesca Dickson MD  Neuro Critical Care  Pager [phone]  4/28/2022     12:47 PM

## 2022-04-28 NOTE — PROGRESS NOTES
Physical Therapy        Physical Therapy Cancel Note      DATE: 2022    NAME: Yanira Navarro  MRN: [id number]   : 1955      Patient not seen this date for Physical Therapy due to:    Patient is not appropriate for PT evaluation/treatment at this time d/t significant medical decline, PT to continue to follow and Re-evaluate as indicated when appropraite and as able.        Electronically signed by Syeda Galvin PT, DPT on 2022 at 10:04 AMP

## 2022-04-28 NOTE — PROGRESS NOTES
SPIRITUAL CARE DEPARTMENT - [facility] [address]  PROGRESS NOTE    Shift date: 4/27/2022  Shift day: Wednesday   Shift # 3    Room # 5824/4264-47   Name: Carmelita Gomes            Age: 75 y.o. Gender: male          Faith: Unknown  Place of Gnosticism: Unknown    Referral: Rapid Response/Code Blue    Admit Date & Time: 4/19/2022  7:31 AM    PATIENT/EVENT DESCRIPTION:  Carmelita Gomes is a 75 y.o. male who was paged out as an \"RRT Alert\" and a \"Code Blue\" soon after. Patient was \"emergently intubated,\" and received several rounds of chest compressions. SPIRITUAL ASSESSMENT/INTERVENTION:   responded to page and gathered patient information outside room.  learned about patient's condition and confirmed with bedside nurse that family should be called.  contacted patient's daughter, Jodie James, and facilitated medical update between patient's nurse, resident and family. Patient's daughter contacted her sister, Venus Shannon, and they agreed to come to the hospital this morning.  greeted patient's daughter, Jodie James, upon her arrival to the unit.  was present as patient's daughter received updates from bedside nurse and unit manager.  escorted patient's daughter to waiting room while care team placed a \"central line. \" Patient's other daughter, Venus Shannon, arrived and  brought them into unit for bedside visitation, per nurse approval. Patient's family continued to meet with doctors and thanked  for care and support. SPIRITUAL CARE FOLLOW-UP PLAN:  Chaplains will remain available to offer spiritual and emotional support as needed.     Electronically signed by Neftaly Montero on 4/28/2022 at [address] [place] [address]  [phone]

## 2022-04-28 NOTE — PROCEDURES
PROCEDURE NOTE - ARTERIAL LINE INSERTION    PATIENT NAME: Via Kunal Buenrostro RECORD #: [id number]  DATE: 4/28/2022  SURGEON: Dr. Ondina Sesay / Tina Bhatti DO  PREOPERATIVE DIAGNOSIS:  Need for invasive BP monitoring  POSTOPERATIVE DIAGNOSIS:  Same  PROCEDURE PERFORMED: right radial arterial line placement  ESTIMATED BLOOD LOSS:  Less than 10 ml  COMPLICATIONS:  None immediately appreciated. OPERATIVE NOTE PREPARED BY: Karolina Sargent DO    DISCUSSION:  75year old male who requires  invasive arterial BP monitoring. The history and physical examination were reviewed and confirmed. Consent was implied as the patient required the procedure emergently. The patient was then prepared for the procedure. PROCEDURE:  A timeout was initiated by the bedside nurse and was confirmed by those present. The patient was placed in a supine position. The skin overlying the right radial artery was prepped with betadine and draped in sterile fashion. Under ultrasound guidance, the introducer needle was inserted into the right radial artery returning pulsatile blood. A guidewire was placed through the center of the needle with no resistance. The catheter was inserted over the guide wire. The guidewire was then removed. The line was connected to the transducer. The catheter then secured using silk suture and a temporary sterile dressing was applied. No immediate complication was evident. All sponge, instrument and needle counts were correct at the completion of the procedure. Karolina Sargent DO  4/28/2022  5:21 AM          Trauma Attending Attestation      I have reviewed the above GCS note(s) and confirmed the key elements of the medical history and physical exam. I have seen and examined the pt. I have discussed the findings, established the care plan and recommendations with Resident, GCS RN, bedside nurse.         Jeana Macedo DO  4/28/2022  6:31 AM

## 2022-04-28 NOTE — PROGRESS NOTES
This RN, charge RN and another staff RN reentered room to find pt trying to get out of bed again despite education, continuing to cuss, swat at us and call us the worst nurses ever. We continue to try to make pt comfortable and readjust him in bed. Pt was re-orinted to place being the hospital due to him believing he is at home, continues to be hostile and confused, all appropriate medications given, will continue to reposition and reorient pt as needed. Will continue to monitor closely from outside the room.

## 2022-04-28 NOTE — PROGRESS NOTES
Occupational [address] [employer]  Occupational Therapy Not Seen Note    DATE: 2022    NAME: Serge Lamas  MRN: [id number]   : 1955      Patient not seen this date for Occupational Therapy due to:    Patient is not appropriate for active participation in OT evaluation/treatment at this time d/t coded this AM, now intuabted/sedated    Next Scheduled Treatment: Attempt 5/2    Electronically signed by KAYLAH Chao on 2022 at 11:28 AM

## 2022-04-28 NOTE — CARE COORDINATION
Received call from [facility] AND [facility], they will continue to follow, are reviewing clinical notes daily. 1652 - Pt re-intubated following arrest last night. FiO2 70%, palliative was consulted. LTACH list given to daughter, Reema Barriga, for consideration, if needed.

## 2022-04-28 NOTE — PROCEDURES
[facility]  Cardioversion Procedure Note         Today's Date: 4/28/2022  Primary/Ordering Cardiologist: Dr. Atilio Reynaga  Indication: Afib with RVR and hemodynamic instability    Patient seen and examined. History and Physical reviewed. Labs reviewed. After informed consent was obtained with explanation of the risks and benefits, the patient was prepared using standard tecqniques. All Sedation was administered via the Cardiologist.     Lottie Ace:    After an adequate level of sedation was achieved  Patient was given IV amio  360J in biphasic synchronized delivery was administered which resulted in conversion to normal sinus rhythm. post procedure 12 L ECG was ordered and reviewed. Impression:    Successful Cardioversion      Complications: There were no complications encountered. Recommendations:  Patient converted to NSR. Currently in sinus tachycardia with HR in 100's  Recommend switching levophed to phenylephrine          Electronically signed on 04/28/22 at 12:51 PM by:    Sagrario Purcell MD, MD   Fellow, [address]    Attending Physician Statement  I have discussed the case of Stefano Hook including pertinent history and exam findings with the resident. I have seen and examined the patient and the key elements of the encounter have been performed by me. I agree with the assessment, plan and orders as documented by the resident/fellow. Changes were made to the note if necessary to reflect my own note. Procedure was performed by me. Electronically signed by    Atilio Reynaga DO, [facility], [address], [address], [place] [address] [facility]  [facility] [employer]  [phone]

## 2022-04-28 NOTE — PROGRESS NOTES
ICU PROGRESS NOTE    PATIENT NAME: Victor Manuel Larios  MEDICAL RECORD NO. [id number]  DATE: 4/28/2022    PRIMARY CARE PHYSICIAN: Jesus Bauer MD    HD: # 9    ASSESSMENT    Patient Active Problem List   Diagnosis    Insomnia    Moderate persistent asthma with acute exacerbation    Mixed hyperlipidemia    Adverse drug reaction    Liver disease, chronic, due to alcohol ([facility]    COPD (chronic obstructive pulmonary disease) with chronic bronchitis (HCC)    Obstructive sleep apnea    Paroxysmal SVT (supraventricular tachycardia) (HCC)    Mild persistent asthma without complication    Dysphagia    History of cervical fracture    Closed fracture of cervical vertebra (HCC)    Cervical radiculopathy    Hypokalemia    Fall    Hypophosphatemia    Acute respiratory failure ([facility]    Blood alcohol level of 240 mg/100 ml or more    Anemia    Cervical spine fracture [facility])       MEDICAL DECISION MAKING AND PLAN    Neuro:  - GCS 3, Early post cardiac arrest period, will reassess.   - Pain/sedation: no sedation Valium 5mg q6, MMT, fentanyl 50 q2 prn, markie 5mg q6 prn, seroquel 100mg nightly.    Will resume sedation after initial neuro assessment post cardiac arrest  - Daily ETOH use  - Thiamine and folic acid   - CTH neg for acute injuries  - CT C spine showing C6-7 fx, MRI C spine showing epidural hemorrhage at C6-7 amd prevertebral soft tissue swelling         - MRA neck showing no significant stenosis        - Intubated due to stridor, DL showing some airway edema        - 4/19 ORIF C6-7 by neurosurgery        - SBP <140 per nsg     CV  - Lactate 2.23 will follow   - --127  - [id number]     - On Levophed post cardiac arrest   - MAP goal > 65, Vasopressin ordered if needed  - Hydralazine 10mg q6 prn for SBP goal <140 per nsg  - Troponin 21, EKG stable on admission  -   - No significant cardiac hx on chart  - Cardiac arrest overnight , Initial Rhythm PEA, 15 minutes CPR, EKG after ROSC Afib RVR, received Amiodarone bolus. Order new bolus+drip  - Currently A. Fib controlled ventricular response   - Troponin trending up last day < 20% Delta  - will continue following Troponin, will ask Echo. Consult Cardiology  - Considering CT chest for PE     Pulm  - Extubated 4/24, Re intubated after cardiac arrest 4/28  - PF 82  - Vent Setting PRVC PEEP 14 Fio2 100   - ABGs 7.39/55/82/34/8    - CXR Post ROSC New hazy density within the left upper lobe may represent atelectasis, New mild left chest wall subcutaneous emphysema.  No gross pneumothorax rib fracture is identified.  - No hx smoking or home O2 requirements  - Considering PE as differential , patient on Levonex     GI/Nutrition  - Diet Tube feeds stopped   - Failed formal swallow study, attempt when mentation improves  - Ppx: protonix, glycolax  - CT AP showing no acute traumatic injuries, diverticulosis with jejunal intussusception, small fat containing umbilical hernia     Renal/lytes  - BUN/Cr 18/0.65 (15/0.51)  - Na/K  143/3.2 (146/3.3), K replaced  - Mg/P 2/7.4  - Total fluid cap 75 ml/hr, LR maintenance  - I/Os 2.8L/300 cc +2.5L in last 24h, 0.1 cc/kg/hr  - Maintenance  Ringers keeping fluids below 75 cc h   - 10L pos, lasix 40mg x1 today, will f/u response     Heme  - Hb 9.8 (11.5)  - Plt  259 (335 )  - DVT ppx lovenox     7.   Endocrine        - -207   - Sliding scale high dosage        - Continue to monitor after ROSC , no known hx DM     Micro  - Tmax 37.2  - WBC 13.3 (8.1)  - Monitor off antimicrobials     Family/dispo  - Remains in ICU  - Family received info.     Lines  - A line, ET, Peripheral 2, Smyth.      CHECKLIST    CAM-ICU RASS: 0  RESTRAINTS: b/l soft wrists  IVF: LR  NUTRITION: tube feeds Stopped  ANTIBIOTICS: n/a  GI: pepcid  DVT: lovenox  GLYCEMIC CONTROL: n/a  HOB >45: n/a  MOBILITY: bedrest  SBT: n/a  IS: NA    SUBJECTIVE    Blaine FLASH Henson presented cardiac arrest overnight, 4 am started agitated, tachycardic (150) with low O2sats (50%). Escalating O2 to cannula 2-4 LTs, presented cardiac arrest, Difficult ventilation with BVM initial rhythm PEA, received 15 mins CPR, adrenaline 4 dosage, Bicarb one amp, Calcium one amp, getting ROSC an Afib RVR, received Amiodarone 150 mg bolus. Family at bedside, received information about case    OBJECTIVE  VITALS: Temp: Temp: 98.6 °F (37 °C)Temp  Av.8 °F (37.1 °C)  Min: 97.4 °F (36.3 °C)  Max: 102.6 °F (26.7 °C) BP Systolic (96HYK), TFY:908 , Min:66 , PSY:591   Diastolic (80WET), YCV:39, Min:37, Max:117   Pulse Pulse  Av.1  Min: 92  Max: 162 Resp Resp  Av  Min: 16  Max: 32 Pulse ox SpO2  Av.7 %  Min: 59 %  Max: 98 %    CONSTITUTIONAL: GCS 15, following commands, conversing appropriately  HEENT: PERRLA  LUNGS: clear bilaterally  CV: HRRR  GI: abdomen soft and non tender  MUSCULOSKELETAL: intact  NEUROLOGIC: GCS 15, following commands, moving all 4 spontaneouslly  SKIN: intact    LAB:  CBC:   Recent Labs     22  0333 22  0353 22  0557   WBC 8.1 9.6 13.3*   HGB 11.5* 10.5* 9.8*   HCT 35.9* 32.8* 31.4*   MCV 94.0 95.6 99.1    331 See Reflexed IPF Result     BMP:   Recent Labs     22  0333 22  0353 22  0557   * 143 143   K 3.3* 3.4* 3.2*    101 100   CO2 30 32* 28   BUN 15 16 18   CREATININE 0.51* 0.47* 0.65*   GLUCOSE 191* 182* 262*       RADIOLOGY:  CXR: 22 pending final read, but appears relatively clear today    22  Impression   Mild bilateral congestion.      Jasson Wise MD  22, 9:18 AM

## 2022-04-28 NOTE — PROGRESS NOTES
Physical Medicine & Rehabilitation  Progress Note    4/28/2022 12:19 PM     CC: Ambulatory and ADL dysfunction due to fall with C6-7 dysfunction status post ACDF and corpectomy alcohol use      Cardiac arrest yesterday, CODE BLUE, chest compressions, PEA rhythm, intubated      Subjective: Intubated    ROS:  Denies fevers, chills, sweats. No chest pain, palpitations, lightheadedness. Denies coughing, wheezing or shortness of breath. Denies abdominal pain, nausea, diarrhea or constipation. No new areas of joint pain. Denies new areas of numbness or weakness. Denies new anxiety or depression issues. No new skin problems. Rehabilitation:     Currently on vent    PT:  Restrictions/Precautions: Seizure,Fall Risk  Other position/activity restrictions: SBP<140, s/p ACDF C6-7 04/19/2022  Required Braces or Orthoses  Cervical: c-collar (at all times except for hygiene)   Transfers  Sit to Stand: Minimal Assistance,2 Person Assistance,Moderate Assistance  Stand to sit: Moderate Assistance,2 Person Assistance,Minimal Assistance  Bed to Chair: Moderate assistance,2 Person Assistance  Comment: Verbal cues for hand placement, MIN/MOD x2 for sit to stand stransfers and bed to chair tx, cognitive decline, multiple attempts to hold stand, pt would stand and sit right back  Ambulation  Surface: level tile  Device: Rolling Walker  Assistance: Moderate assistance  Quality of Gait: unsteady, decreased gait speed, decreased stride length, increased ROMAIN. Gait Deviations: Slow Diana,Increased ROMAIN,Decreased step length  Distance: 8 ft  Comments: Increased time for motor planning. More Ambulation?: No          OT:  ADL  Feeding: Modified independent ,Setup  Grooming: Setup,Stand by assistance  Grooming Skilled Clinical Factors:  Face washing facilitated seated in chair  UE Bathing: Minimal assistance,Setup,Increased time to complete,Verbal cueing  LE Bathing: Setup,Verbal cueing,Increased time to complete,Moderate assistance  UE Dressing: Minimal assistance,Setup,Verbal cueing,Increased time to complete  UE Dressing Skilled Clinical Factors: to doff/don gown seated in chair req Min A for threading BUE into sleeves  LE Dressing: Setup,Verbal cueing,Increased time to complete,Moderate assistance  Toileting: Moderate assistance,Setup,Increased time to complete,Adaptive equipment  Additional Comments: Throughout session pt limited per elevated HR and decreased cognition. Balance  Sitting Balance: Contact guard assistance (CGA for static sitting and Min A for dynamic sitting/reaching; total sitting time ~20 min)  Standing Balance: Minimal assistance   Standing Balance  Time: ~1 min  Activity: standing prior to and following functional mobility with RW  Functional Mobility  Functional - Mobility Device: Rolling Walker  Activity: Other  Assist Level: Moderate assistance  Functional Mobility Comments: Pt completed short functional mobility from EOB > recliner utilizing RW and requiring Mod A d/t balance deficits. Pt demonstrates impulsive behaviors and difficulty with motor planning requiring VCs/TCs for appropriate RW navigation     Bed mobility  Rolling to Right: Moderate assistance  Supine to Sit: Moderate assistance,2 Person assistance,Minimal assistance  Sit to Supine: Unable to assess  Scooting: Minimal assistance  Comment: MOD assist x1, MIN x1 for sup to sit, MIN A to scoot to EOB, pt retired to chair end of treat  Transfers  Sit to stand: Moderate assistance  Stand to sit: Moderate assistance  Transfer Comments: utilizing RW req v/c for proper hand placement                   ST: Pending           Objective:  /76   Pulse 148   Temp 98.6 °F (37 °C) (Axillary)   Resp (!) 31   Ht 6' 2\" (1.88 m)   Wt 260 lb 12.9 oz (118.3 kg)   SpO2 97%   BMI 33.49 kg/m²  I Body mass index is 33.49 kg/m².  I   Wt Readings from Last 1 Encounters:   22 260 lb 12.9 oz (118.3 kg)      Temp (24hrs), Av.9 °F (37.2 °C), Min:97.4 °F (36.3 °C), Max:102.6 °F (39.2 °C)         GEN: well developed, well nourished, no acute distress  HEENT: Normocephalic atraumatic,-Aspen collar  CV: RRR, no murmurs, rubs or gallops  PULM: Respirations WNL and unlabored  ABD: soft, NT, ND, +BS and equal  NEURO: On vent, unable to evaluate  MSK: Sedated  EXTREMITIES: . No edema BLEs  SKIN: warm dry and intact with good turgor  PSYCH: Intubated        Medications   Scheduled Meds:   chlorhexidine  15 mL Mouth/Throat BID    famotidine (PEPCID) injection  20 mg IntraVENous BID    insulin lispro  0-18 Units SubCUTAneous Q4H    metoprolol  5 mg IntraVENous Once    acetaminophen  1,000 mg Oral E0N    folic acid  1 mg Oral Daily    polyethylene glycol  17 g Oral Daily    atorvastatin  20 mg Oral Nightly     Continuous Infusions:   norepinephrine 15 mcg/min (04/28/22 0854)    vasopressin (Septic Shock) infusion      fentaNYL 50 mcg/mL      amiodarone 1 mg/min (04/28/22 0746)    Followed by   Kristie Strong amiodarone      propofol      dextrose      heparin (PORCINE) Infusion 8.45 Units/kg/hr (04/28/22 1015)    insulin (HUMAN R) non-weight based infusion Stopped (04/28/22 1016)    phenylephrine (SONALI-SYNEPHRINE) 50mg/250mL infusion 100 mcg/min (04/28/22 1210)    lactated ringers Stopped (04/28/22 1000)     PRN Meds:.dextrose, glucagon (rDNA), dextrose, oxyCODONE, albuterol sulfate HFA, benzocaine-menthol, ondansetron **OR** ondansetron, hydrALAZINE     Diagnostics:     CBC:   Recent Labs     04/28/22  0353 04/28/22  0557 04/28/22  0937   WBC 9.6 13.3* 16.5*   RBC 3.43* 3.17* 3.51*   HGB 10.5* 9.8* 10.5*   HCT 32.8* 31.4* 34.2*   MCV 95.6 99.1 97.4   RDW 22.3* 22.1* 22.4*    See Reflexed IPF Result 431     BMP:   Recent Labs     04/27/22  0333 04/28/22  0353 04/28/22  0557   * 143 143   K 3.3* 3.4* 3.2*    101 100   CO2 30 32* 28   PHOS 3.4 3.4 7.4*   BUN 15 16 18   CREATININE 0.51* 0.47* 0.65*     BNP: No results for input(s): BNP in the last 72 hours. PT/INR: No results for input(s): PROTIME, INR in the last 72 hours. APTT:   Recent Labs     04/28/22  0937   APTT 26.2     CARDIAC ENZYMES: No results for input(s): CKMB, CKMBINDEX, TROPONINT in the last 72 hours. Invalid input(s): CKTOTAL;3  FASTING LIPID PANEL:  Lab Results   Component Value Date    CHOL 100 08/19/2021    HDL 41 08/19/2021    TRIG 82 08/19/2021     LIVER PROFILE: No results for input(s): AST, ALT, ALB, BILIDIR, BILITOT, ALKPHOS in the last 72 hours. I/O (24Hr): Intake/Output Summary (Last 24 hours) at 4/28/2022 1219  Last data filed at 4/28/2022 1113  Gross per 24 hour   Intake 2638.55 ml   Output 10 ml   Net 2628.55 ml       Glu last 24 hour  Recent Labs     04/28/22  0618 04/28/22  0828 04/28/22  1037 04/28/22  1125   POCGLU 285* 278* 214* 255*       No results for input(s): CLARITYU, COLORU, PHUR, SPECGRAV, PROTEINU, RBCUA, BLOODU, BACTERIA, NITRU, WBCUA, LEUKOCYTESUR, YEAST, GLUCOSEU, BILIRUBINUR in the last 72 hours. Impression: Mr. Carmelita Gomes is a 75 y.o. male with a history of History of cervical fracture     1. Fall with C6-7 disruption, C7 radiculopathy status post ACDF C6-7 and C7 corpectomy Aspen collar at all times except hygiene  2. Cardiac arrest 4/27 at night-currently intubated  3. Daily alcohol use  4. Extubated 4/24  5. Noted diverticulosis and jejunal intussusception and umbilical hernia on CT  6. Currently n.p.o.-failed swallow study-noted plan to reevaluate 5 to 7 days with swallow study  7. Hypertension/hyperlipidemia-Lipitor  8. Chronic low back pain  9. Pain-Roxicodone, Motrin, Tylenol, fentanyl Neurontin, Robaxin  10. Agitation?/Delirium-Currently on Precedex, Seroquel Valium,and trazodone, melatonin, multiple attempts to pull NG tube   11. Hypokalemia potassium  12. Hypernatremia sodium 146     Recommendations:  1. Diagnosis: Fall with C7 radiculopathy status post ACDF C6-7 and C7 corpectomy  2.  Therapy: Has PT OT and speech need-however currently intubated status post cardiac  3. Medical  Necessity: As above  4. Support: Clarify-suspect will need at least supervision 24/7 due to cognition, risk of falls, drinking, cervical fracture/Aspen collar at all times except hygiene  5. Rehab recommendation:  Will follow progress however discussed with family present-discussed rehab optionscannot provide 24/7, they are concerned of his drinking and being left alone, they are looking into SNF when ready    6. DVT proph: Lovenox     Discussed with nursing, and     Vic Toledo MD       This note is created with the assistance of a speech recognition program.  While intending to generate a document that actually reflects the content of the visit, the document can still have some errors including those of syntax and sound a like substitutions which may escape proof reading.   In such instances, actual meaning can be extrapolated by contextual diversion

## 2022-04-29 NOTE — PROGRESS NOTES
[facility]  Speech Language Pathology    Date: 4/29/2022  Patient Name: Stefano Hook  YOB: 1955   AGE: 75 y.o. MRN: [id number]        Patient Not Available for Speech Therapy     Due to:  [] Testing  [] Hemodialysis  [] Cancelled by RN  [] Surgery   [x] Intubation/Sedation/Pain Medication  [] Medical instability  [] Other:    Next scheduled treatment: as medically appropriate    Completed by: Kristie Zayas, SLP, M.A.  CCC-SLP

## 2022-04-29 NOTE — PLAN OF CARE
- overnight, CT chest showed a completely resected cervical spine at c6-7, CT chest was done to look for PE, but also found to have pneumothorax.     - both daughters stated if patient cannot live independently and move around freely, he will never want to live a life being intubated or in a nursing home or being bedbound    - Reviewed the EEG with family at bedside, LTME overnight didn't show any true seizures at this time but diffuse GPDs throughout the whole time, such GPDs are deeply concerned for diffuse anoxic brain injury and often can lead to seizure later on and given his current post cardiac arrest state and clinical exam with occasional upper facial myoclonic jerks, prognosis tend to be very poor for meaningful neurological recovery on the top of his other medical conditions,    - at this time point, both daughters strongly expressed the patient's wish that if pt will not be able to live reasonably independently, family would like to honor patient's wish and make him comfort care and would like to speak to palliative care team    - will d/c MRIs and EEG, no further workup needed at this time, given the poor prognosis and strong family wish, MRI and EEG will not  in a meaningful way, will respect family wish to have patient being comfortable        I spend a total of 45 minutes with greater than 60% of time spent face to face, counseling, coordinating care, examining patient, reviewing images and labs personally, and speaking with team.      Aleja Otoole MD  Attending Neurologist/Neurointensivist

## 2022-04-29 NOTE — PROCEDURES
Referring physician: Dr. Miryam Munson  Date: 4/29/2022  Start Time:4/28/2022 @ 1800  End Time: 4/29/2022 @ 1800    Indication  Patient with encephalopathy, EEG done to rule out subclinical seizures. Introduction  This continuous video-EEG was acquired using a [employer] workstation at 256 samples/s. Electrodes were placed according to the International 10-20 system. Automated spike and seizure detection algorithms were applied. Video was recorded during this study. Description  The background consistent of continuous generalized spike/polyspikes periodic discharges at 1-2 Hz, by the end of the study at 2 Hz superimposed of attenuation. No consistent focal slowing or interhemispheric asymmetry was noted. Normal sleep structures were not observed. @ 0900 GPDs became at 2 HZ  @  GPDs became at 3-4 Hz    Events  4/28/2022: clinical random abdominal movements, did not correlate with GPDs. Unclear significance, later the study no recurrence. 4/29/2022: Per neurosurgery patient is having rhythmic shoulder twitching. No very clear on video. Impression  Abnormal continuous vEEG recording, the slowing mentioned above suggests severe non specific encephalopathy. GPDs at 2-4 Hz considered in ictal-interictal spectrum. According to [place] criteria this concerning status epilepticus, this pattern can be/is seen in severe/diffuse anoxic brain injury. However clinical correlation is recommended. Discussed with ICU team.      Nivia Meier MD  Epilepsy Board Certified. Neurology Board Certified.     Electronically Signed

## 2022-04-29 NOTE — PLAN OF CARE
Problem: Confusion - Acute:  Goal: Absence of continued neurological deterioration signs and symptoms  Description: Absence of continued neurological deterioration signs and symptoms  4/28/2022 2310 by Sid Mayorga RN  Outcome: Not Progressing  4/28/2022 1108 by Waldron Hamman, RN  Outcome: Progressing  Goal: Mental status will be restored to baseline  Description: Mental status will be restored to baseline  4/28/2022 2310 by Sid Mayorga RN  Outcome: Not Progressing  4/28/2022 1108 by Waldron Hamman, RN  Outcome: Progressing     Problem: Discharge Planning:  Goal: Ability to perform activities of daily living will improve  Description: Ability to perform activities of daily living will improve  4/28/2022 2310 by Sid Mayorga RN  Outcome: Not Progressing  4/28/2022 1108 by Waldron Hamman, RN  Outcome: Progressing  Goal: Participates in care planning  Description: Participates in care planning  4/28/2022 2310 by Sid Mayorga RN  Outcome: Not Progressing  4/28/2022 1108 by Waldron Hamman, RN  Outcome: Progressing     Problem: Injury - Risk of, Physical Injury:  Goal: Absence of physical injury  Description: Absence of physical injury  4/28/2022 2310 by Sid Mayorga RN  Outcome: Not Progressing  4/28/2022 1108 by Waldron Hamman, RN  Outcome: Progressing  Goal: Will remain free from falls  Description: Will remain free from falls  4/28/2022 2310 by Sid Mayorga RN  Outcome: Not Progressing  4/28/2022 1108 by Waldron Hamman, RN  Outcome: Progressing     Problem: Mood - Altered:  Goal: Mood stable  Description: Mood stable  4/28/2022 2310 by Sid Mayorga RN  Outcome: Not Progressing  4/28/2022 1108 by Waldron Hamman, RN  Outcome: Progressing  Goal: Absence of abusive behavior  Description: Absence of abusive behavior  4/28/2022 2310 by Sid Mayorga RN  Outcome: Not Progressing  4/28/2022 1108 by Waldron Hamman, RN  Outcome: Progressing  Goal: Verbalizations of feeling emotionally comfortable while being cared for will increase  Description: Verbalizations of feeling emotionally comfortable while being cared for will increase  4/28/2022 2310 by Taty Hickey RN  Outcome: Not Progressing  4/28/2022 1108 by Ryder Werner RN  Outcome: Progressing     Problem: Psychomotor Activity - Altered:  Goal: Absence of psychomotor disturbance signs and symptoms  Description: Absence of psychomotor disturbance signs and symptoms  4/28/2022 2310 by Taty Hickey RN  Outcome: Not Progressing  4/28/2022 1108 by Ryder Werner RN  Outcome: Progressing     Problem: Sensory Perception - Impaired:  Goal: Demonstrations of improved sensory functioning will increase  Description: Demonstrations of improved sensory functioning will increase  4/28/2022 2310 by Taty Hickey RN  Outcome: Not Progressing  4/28/2022 1108 by Ryder Werner RN  Outcome: Progressing  Goal: Decrease in sensory misperception frequency  Description: Decrease in sensory misperception frequency  4/28/2022 2310 by Taty Hickey RN  Outcome: Not Progressing  4/28/2022 1108 by Ryder Werner RN  Outcome: Progressing  Goal: Able to refrain from responding to false sensory perceptions  Description: Able to refrain from responding to false sensory perceptions  4/28/2022 2310 by Taty Hickey RN  Outcome: Not Progressing  4/28/2022 1108 by Ryder Werner RN  Outcome: Progressing  Goal: Demonstrates accurate environmental perceptions  Description: Demonstrates accurate environmental perceptions  4/28/2022 2310 by Taty Hickey RN  Outcome: Not Progressing  4/28/2022 1108 by Ryder Werner RN  Outcome: Progressing  Goal: Able to distinguish between reality-based and nonreality-based thinking  Description: Able to distinguish between reality-based and nonreality-based thinking  4/28/2022 2310 by Taty Hickey RN  Outcome: Not Progressing  4/28/2022 1108 by Ryder Werner RN  Outcome: Progressing  Goal: Able to interrupt nonreality-based thinking  Description: Able to interrupt nonreality-based thinking  4/28/2022 2310 by Irene Espinosa RN  Outcome: Not Progressing  4/28/2022 1108 by Latoya Trinidad RN  Outcome: Progressing     Problem: Sleep Pattern Disturbance:  Goal: Appears well-rested  Description: Appears well-rested  4/28/2022 2310 by Irene Espinosa RN  Outcome: Not Progressing  4/28/2022 1108 by Latoya Trinidad RN  Outcome: Progressing     Problem: Skin Integrity:  Goal: Will show no infection signs and symptoms  Description: Will show no infection signs and symptoms  4/28/2022 2310 by Irene Espinosa RN  Outcome: Not Progressing  4/28/2022 1108 by Latoya Trinidad RN  Outcome: Progressing  Goal: Absence of new skin breakdown  Description: Absence of new skin breakdown  4/28/2022 2310 by Irene Espinosa RN  Outcome: Not Progressing  4/28/2022 1108 by Latoya Trinidad RN  Outcome: Progressing     Problem: Falls - Risk of:  Goal: Absence of physical injury  Description: Absence of physical injury  4/28/2022 2310 by Irene Espinosa RN  Outcome: Not Progressing  4/28/2022 1108 by Latoya Trinidad RN  Outcome: Progressing  Goal: Will remain free from falls  Description: Will remain free from falls  4/28/2022 2310 by Irene Espinosa RN  Outcome: Not Progressing  4/28/2022 1108 by Latoya Trinidad RN  Outcome: Progressing     Problem: Discharge Planning  Goal: Discharge to home or other facility with appropriate resources  4/28/2022 2310 by Irene Espinosa RN  Outcome: Not Progressing  4/28/2022 1108 by Latoya Trinidad RN  Outcome: Progressing     Problem: Pain  Goal: Verbalizes/displays adequate comfort level or baseline comfort level  4/28/2022 2310 by Irene Espinosa RN  Outcome: Not Progressing  4/28/2022 1108 by Latoya Trinidad RN  Outcome: Progressing     Problem: Nutrition Deficit:  Goal: Optimize nutritional status  4/28/2022 2310 by Irene Espinosa RN  Outcome: Not Progressing  4/28/2022 1108 by Latoya Trinidad RN  Outcome: Progressing     Problem: ABCDS Injury Assessment  Goal: Absence of physical injury  4/28/2022 2310 by Taty Hickey RN  Outcome: Not Progressing  4/28/2022 1108 by Ryder Werner RN  Outcome: Progressing

## 2022-04-29 NOTE — PROGRESS NOTES
Spoke with both daughters at bedside with myself and Dr. Tari Barillas at bedside. Dr explained pt had massive Pneumothorax and small clots in the lungs that could have caused his arrest this AM. Chest tube checked and re taped by dr Reina Joel himself with this RN at bedside and verified suction, placement etc. With this RN at bedside. [facility] code status thoroughly explained to both POA's present. All questions answered and pt will be switched to [facility] status stat. Will continue to monitor.

## 2022-04-29 NOTE — PROGRESS NOTES
Daily Progress Note  Neuro Critical Care    Patient Name: Holden Maier  Patient : 1955  Room/Bed: 1009/1009-01  Code Status: DNR-CCA  Allergies: Allergies   Allergen Reactions    Seasonal Other (See Comments)     Sneezing    Cat Hair Extract Rash       CHIEF COMPLAINT:       GCS 3 s/p PEA arrest     INTERVAL HISTORY    Initial Presentation (Admitted 22): The patient is a 75 y.o. male admitted to the trauma service on 2022 after sustaining C6-C7 fracture following a mechanical fall down 15 steps while intoxicated. Patient underwent ORIF, discectomy and corpectomy the same day. Hospital course complicated by alcohol withdrawal, as well as postoperative spinal epidural hemorrhage and soft tissue swelling, but no significant stenosis. Patient was extubated on  and has been awake, alert and interactive since that time. Early this morning, patient became progressively more confused, combative and hypoxic, requiring supplemental oxygen. Patient then became tachycardic into the 140s and 150s then became increasingly more bradycardic and hypoxic into the 50s until he went into PEA arrest at 4:41 AM.  Patient underwent 8 rounds of CPR, requiring 4 rounds of epinephrine and 1 amp of bicarb. Patient was intubated successfully on the second attempt following 10 minutes of CPR. ROSC was regained at 4:55 AM.  Since that time, patient has been a GCS of 3T. Has had additional atrial fibrillation with RVR and is currently on amiodarone drip at 1 mg/min. Patient also on pressors, phenylephrine at 100 mics per minute. Last 24h:     CT brain showed no acute process. EEG had no seizure activity and abnormal movements that did not correlate with GPDs, as well as severe encephalopathy. Additionally, patient was taken for CT to evaluate for PE, which was demonstrated, as well as multiple rib fractures and a large left PTx for which a chest tube was placed.  Most significantly, CT revealed a 2cm displacement of the fusion and corpectomy hardware relative to C6 w/ associated mass effect on the prevertebral and posterior pharyngeal soft tissues.     CURRENT MEDICATIONS:  SCHEDULED MEDICATIONS:   chlorhexidine  15 mL Mouth/Throat BID    famotidine (PEPCID) injection  20 mg IntraVENous BID    insulin lispro  0-18 Units SubCUTAneous Q4H    metoprolol  5 mg IntraVENous Once    levetiracetam  500 mg IntraVENous Q12H    acetaminophen  1,000 mg Oral Z7W    folic acid  1 mg Oral Daily    polyethylene glycol  17 g Oral Daily    atorvastatin  20 mg Oral Nightly     CONTINUOUS INFUSIONS:   norepinephrine Stopped (22 1158)    vasopressin (Septic Shock) infusion      fentaNYL 50 mcg/mL 50 mcg/hr (22 0400)    amiodarone 0.5 mg/min (22 05)    propofol Stopped (22)    dextrose      heparin (PORCINE) Infusion 12.45 Units/kg/hr (22 05)    insulin (HUMAN R) non-weight based infusion Stopped (22 1016)    phenylephrine (SONALI-SYNEPHRINE) 50mg/250mL infusion Stopped (22 0215)    lactated ringers Stopped (22 1000)     PRN MEDICATIONS:   dextrose, glucagon (rDNA), dextrose, heparin (porcine), heparin (porcine), oxyCODONE, albuterol sulfate HFA, benzocaine-menthol, ondansetron **OR** ondansetron, hydrALAZINE    VITALS:  Temperature Range: Temp: 100 °F (37.8 °C) (new ice applied) Temp  Av.9 °F (37.2 °C)  Min: 98 °F (36.7 °C)  Max: 100.1 °F (37.8 °C)  BP Range: Systolic (95CXK), ZZO:457 , Min:95 , NXJ:303     Diastolic (93GFB), UYK:27, Min:67, Max:89    Pulse Range: Pulse  Av  Min: 86  Max: 163  Respiration Range: Resp  Av  Min: 18  Max: 33  Current Pulse Ox: SpO2: 96 %  24HR Pulse Ox Range: SpO2  Av.9 %  Min: 84 %  Max: 100 %  Patient Vitals for the past 12 hrs:   BP Temp Temp src Pulse Resp SpO2   22 0615 127/73 -- -- 87 -- 96 %   22 0600 128/72 100 °F (37.8 °C) Oral 88 -- 96 %   22 0545 129/73 -- -- 89 -- 96 %   22 0540 -- -- -- -- -- 95 %   04/29/22 0530 133/81 -- -- 91 -- 95 %   04/29/22 0515 130/75 -- -- 92 -- 94 %   04/29/22 0500 134/77 -- -- 93 -- 94 %   04/29/22 0445 (!) 155/85 -- -- 92 20 95 %   04/29/22 0440 -- -- -- 95 -- 98 %   04/29/22 0430 137/86 -- -- 92 -- (!) 84 %   04/29/22 0415 134/78 -- -- 90 -- 93 %   04/29/22 0400 133/76 99.3 °F (37.4 °C) Oral 92 22 93 %   04/29/22 0345 (!) 149/80 -- -- 90 24 94 %   04/29/22 0330 129/75 -- -- 89 20 94 %   04/29/22 0320 -- -- -- -- 22 --   04/29/22 0315 127/73 -- -- 89 20 94 %   04/29/22 0300 132/74 -- -- 89 18 94 %   04/29/22 0245 124/80 -- -- 89 18 94 %   04/29/22 0230 126/76 -- -- 89 20 93 %   04/29/22 0215 (!) 146/82 -- -- 89 22 90 %   04/29/22 0200 126/79 100.1 °F (37.8 °C) Oral 89 26 95 %   04/29/22 0145 124/79 -- -- 90 18 95 %   04/29/22 0130 127/79 -- -- 87 18 96 %   04/29/22 0115 131/79 -- -- 87 18 97 %   04/29/22 0100 135/86 -- -- 86 18 98 %   04/29/22 0045 130/80 -- -- 87 18 96 %   04/29/22 0030 130/80 -- -- 87 18 97 %   04/29/22 0015 129/81 -- -- 87 18 97 %   04/29/22 0000 131/82 98.7 °F (37.1 °C) Oral 87 18 97 %   04/28/22 2345 (!) 141/89 -- -- 88 -- 100 %   04/28/22 2335 -- -- -- 88 -- 99 %   04/28/22 2330 126/80 -- -- 86 -- 97 %   04/28/22 2315 125/84 -- -- 87 -- 98 %   04/28/22 2300 124/83 99 °F (37.2 °C) Oral 87 18 99 %   04/28/22 2245 127/82 -- -- 87 -- 98 %   04/28/22 2230 128/81 -- -- 86 -- 99 %   04/28/22 2225 (!) 142/82 -- -- 87 -- --   04/28/22 2215 -- -- -- 87 -- 98 %   04/28/22 2200 130/81 -- -- 87 -- 98 %   04/28/22 2151 -- -- -- 87 -- 99 %   04/28/22 2145 -- -- -- 86 -- 99 %   04/28/22 2130 -- 98.9 °F (37.2 °C) Oral 87 -- 99 %   04/28/22 2115 -- -- -- 89 -- 99 %   04/28/22 2100 127/79 -- -- 89 -- 99 %   04/28/22 2045 -- -- -- 89 -- 98 %   04/28/22 2030 -- -- -- 88 -- 100 %   04/28/22 2020 -- -- -- 87 -- 100 %   04/28/22 2015 -- -- -- 87 -- 100 %   04/28/22 2000 137/87 98.7 °F (37.1 °C) Oral 88 18 100 %   04/28/22 1945 -- -- -- 89 -- 100 % 04/28/22 1930 -- -- -- 88 -- 100 %   04/28/22 1915 -- -- -- 88 -- 99 %   04/28/22 1900 124/77 98.4 °F (36.9 °C) Axillary 89 -- 99 %         RECENT LABS:   Lab Results   Component Value Date    WBC 13.1 (H) 04/29/2022    HGB 9.7 (L) 04/29/2022    HCT 31.9 (L) 04/29/2022     04/29/2022    CHOL 100 08/19/2021    TRIG 82 08/19/2021    HDL 41 08/19/2021    ALT 67 (H) 04/19/2022    AST 87 (H) 04/19/2022     04/28/2022    K 3.2 (L) 04/28/2022     04/28/2022    CREATININE 0.65 (L) 04/28/2022    BUN 18 04/28/2022    CO2 28 04/28/2022    TSH 3.23 02/27/2017    PSA 1.21 04/10/2019    INR 1.0 04/19/2022    LABA1C 6.1 (H) 09/18/2020     24 HOUR INTAKE/OUTPUT:    Intake/Output Summary (Last 24 hours) at 4/29/2022 1432  Last data filed at 4/29/2022 0600  Gross per 24 hour   Intake 1713.41 ml   Output 990 ml   Net 723.41 ml       Labs and Images reviewed with:  [] Dr. Anne Reece. Gulshan    [] Dr. Jose A Miller  [x] Dr. Cas Christine  [] There are no new interval images to review. PHYSICAL EXAM       CONSTITUTIONAL:  Critically ill-appearing, nonresponsive   HEAD:  normocephalic, atraumatic    EYES:  PERRLA   ENT:  moist mucous membranes   NECK:  supple, symmetric   LUNGS:  Equal air entry bilaterally   CARDIOVASCULAR:  normal s1 / s2, RRR, distal pulses intact   ABDOMEN:  Soft, no rigidity   NEUROLOGIC:  Mental Status:  comatose             Cranial Nerves:               Pupils 4mm and reactive bilaterally, corneal reflexes present, no cough or gag. Jerking movements of facial muscles B/L     Motor Exam:    All limbs flaccid           Deep Tendon Reflexes:    Right Bicep:  0  Left Bicep:  0  Right Knee:  0  Left Knee:  0     Plantar Response:  Right:  no response  Left:  no response                      DRAINS:  [x] There are no drains for Neuro Critical Care to monitor at this time.      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 This was discussed with the patient's family, who reiterated that the patient would not want to live with such severe disability. They are currently considering comfort care for him. - Will DC LTME and cancel MRI, as these will not .  - Recommend ongoing palliative care involvement. We will sign off at this time. For any changes in exam or patient status please contact Neuro Critical Care.       Tommy Villatoro MD  Neuro Critical Care  Pager [phone]  4/29/2022     6:24 AM

## 2022-04-29 NOTE — PROCEDURES
Referring physician: Dr. Josef Pabon  Date: 4/29/2022  Start Time:4/28/2022 @ 1800  End Time: 4/29/2022 @ 3440    Indication  Patient with encephalopathy, EEG done to rule out subclinical seizures. Introduction  This continuous video-EEG was acquired using a MicroEval workstation at 256 samples/s. Electrodes were placed according to the International 10-20 system. Automated spike and seizure detection algorithms were applied. Video was recorded during this study. Description  The background consistent of continuous generalized spike/polyspikes periodic discharges at 1-2 Hz, by the end of the study at 2 Hz superimposed of attenuation. No consistent focal slowing or interhemispheric asymmetry was noted. Normal sleep structures were not observed. @ 0900 GPDs became at 2 HZ  @  GPDs became at 3-4 Hz    Events  4/28/2022: clinical random abdominal movements, did not correlate with GPDs. Unclear significance, later the study no recurrence. 4/29/2022: Per neurosurgery patient is having rhythmic shoulder twitching. No very clear on video. Impression  Abnormal continuous vEEG recording, the slowing mentioned above suggests severe non specific encephalopathy. GPDs at 2-4 Hz considered in ictal-interictal spectrum. According to [place] criteria this considered status epilepticus. Correlate clinically. Discussed with ICU team.      Jesse Coburn MD  Epilepsy Board Certified. Neurology Board Certified.     Electronically Signed

## 2022-04-29 NOTE — PROGRESS NOTES
[facility]   Progress Note                 Date:   4/29/2022  Patient name:  Dyan Garces  Date of admission:  4/19/2022  7:31 AM  MRN:   [id number]  YOB: 1955  PCP:    Janessa Valverde MD    Reason for Admission: Alcoholic ketoacidosis [N84.9]  History of cervical fracture [Z87.81]  Closed nondisplaced fracture of seventh cervical vertebra, unspecified fracture morphology, [facility] [S12.601A]  Closed nondisplaced fracture of sixth cervical vertebra, unspecified fracture morphology, initial encounter ([facility] [S12.501A]      Subjective:       Clinical Changes / Abnormalities:     Found to have large L pneumothorax and subsegmental PE yesterday. S/p chest tube insertion  HR improved significantly post cardioversion. I/O last 3 completed shifts:   In: 3115.7 [I.V.:135.3; NG/GT:2050; IV Piggyback:930.5]  Out: 36 [Urine:760]  I/O this shift:  In: 1558.4 [I.V.:1223.3; NG/GT:60; IV Piggyback:275.1]  Out: 530 [Urine:490; Chest Tube:40]            Medications:   Scheduled Meds:    chlorhexidine  15 mL Mouth/Throat BID    famotidine (PEPCID) injection  20 mg IntraVENous BID    metoprolol  5 mg IntraVENous Once    levetiracetam  500 mg IntraVENous Q12H    acetaminophen  1,000 mg Oral M6J    folic acid  1 mg Oral Daily    polyethylene glycol  17 g Oral Daily    atorvastatin  20 mg Oral Nightly     Continuous Infusions:    vasopressin (Septic Shock) infusion      fentaNYL 50 mcg/mL 50 mcg/hr (04/29/22 0600)    amiodarone 0.5 mg/min (04/29/22 0600)    propofol Stopped (04/28/22 2000)    dextrose      heparin (PORCINE) Infusion 12.45 Units/kg/hr (04/29/22 0523)    insulin (HUMAN R) non-weight based infusion Stopped (04/28/22 1016)    phenylephrine (SONALI-SYNEPHRINE) 50mg/250mL infusion Stopped (04/29/22 0215)    lactated ringers Stopped (04/28/22 1000)     CBC:   Recent Labs     04/28/22  0557 04/28/22  0937 04/29/22  0542   WBC 13.3* 16.5* 13.1*   HGB 9.8* 10.5* 9.7*   PLT See Reflexed IPF Result [id number]     BMP:    Recent Labs     04/28/22  0353 04/28/22  0557 04/29/22  0542    143 141   K 3.4* 3.2* 4.0    100 101   CO2 32* 28 31   BUN 16 18 20   CREATININE 0.47* 0.65* 0.54*   GLUCOSE 182* 262* 180*     Hepatic: No results for input(s): AST, ALT, ALB, BILITOT, ALKPHOS in the last 72 hours. Troponin:   Recent Labs     04/27/22  0955 04/28/22  0745 04/28/22  1234   TROPHS 47* 92* 106*     BNP: No results for input(s): BNP in the last 72 hours. Lipids: No results for input(s): CHOL, HDL in the last 72 hours. Invalid input(s): LDLCALCU  INR: No results for input(s): INR in the last 72 hours. Objective:   Vitals: /75   Pulse 84   Temp 100 °F (37.8 °C) (Oral) Comment: new ice applied  Resp 20   Ht 6' 2\" (1.88 m)   Wt 260 lb 12.9 oz (118.3 kg)   SpO2 97%   PF (!) 18 L/min   BMI 33.49 kg/m²   Constitutional and General Appearance: intubated and sedated  Respiratory:  · Mechanically ventilated  Cardiovascular:  · Normal S1 and S2.   · Jugular venous pulsation Normal  Abdomen:   · Soft  · No tenderness  Extremities:  · No lower extremity edema  Neurologic:  · Intubated and sedated     Assessment:   1. PEA arrest likely 2/2 hypoxia. CT scan showed Left sided pneumothorax and subsegmental PE s/p chest tube insertion  2. Hx of COPD  3. Afib with RVR post arrest, new onset. Unresponsive to amio and digoxin. S/p sync cardioversion on 4/28/22  4. S/p ORIF C6-7 by neurosurgery            Plan / Recommendations:   1. Heart rates improved post cardioversion. Currently off pressors. Continue on amiodarone drip for now. 2. Continue heparin drip          Thank you for allowing us to participate in [address] [place] [address].      Electronically signed on 04/29/22 at 6:49 AM by:    Linnea Clayton MD, MD   Fellow, [address]

## 2022-04-29 NOTE — PLAN OF CARE
Neurosurgery    Family had changed patients code status to [facility] after speaking with neuro critical care and trauma team  Family did not wish for any neurological exam and to not disturb patient  As per family wishes exam was not completed    MRI cervical spine and brain discontinued    Electronically signed by MADI Freedman NP on 4/29/2022 at 1:06 PM

## 2022-04-29 NOTE — CARE COORDINATION
Transitional Planning    Per report patient is DNR-CCA, palliative consult,on IV amiodarone, heparin, and sedation, continuous EEG, has CT. Plan for MRI Monday to help determine plan of care. 1300 pe rDr Brittny Boyce patient is DNR and terminal extubation planned for today. Waiting for family to say good byes. If patient needs care after extubation, family wants referral to [facility].

## 2022-04-29 NOTE — PROGRESS NOTES
Pt unable to complete mri screening family will be in at [address] [place] [address], will have days rn complete form.

## 2022-04-29 NOTE — PROGRESS NOTES
Dr. Johann Lopez called to bedside to evaluate arterial line dampening. Pt cuff pressure was correlating prior to not working, pt only on 40mcg of mychal and heading downward. Dr Mary Junior stated to keep arterial line in place if drawing still and titrate/check cuff pressures as needed and per policy, will reassess arterial line need in AM per his verbal order. Will continue to monitor.

## 2022-04-29 NOTE — SIGNIFICANT EVENT
Discussed with family including both daughters Power Palmer and Hafsa Llamas and patient's ex-wife with regards to patient's prognosis, explained the hospital course over the past 48 hours and the unfortunate subsequent events of hypoxic brain injury. Stated that both neurosurgery and neuro critical care are following for such. Family stated at this time that due to poor prognosis after speaking with neuro critical care team, they would like to palliate patient and change his code status to [facility]. Family would like palliative care at bedside prior to changing code status. Palliative care team paged. Family declined need for  services at this time.     Mars Zhao MD   12:30 PM

## 2022-04-29 NOTE — PROGRESS NOTES
Ren  22.    Neurosurgery Service  Resident Daily Progress Note   4/29/2022 1:20 AM     Subjective  Recent pulmonary failure with subsequent PEA arrest requiring 8 rounds of CPR 4/28 early morning 4AM. Patient incidentally had large left pneumothorax with concern for PE underwent CT PE rule out and CT demonstrated unstable 2cm anterior dislocation of recent C6-T1 spinal surgery. Patient GCS 3T on LTME being followed by neuro critical care for brain prognostication.      Objective    Vitals:    04/28/22 2335 04/28/22 2345 04/29/22 0000 04/29/22 0015   BP:  (!) 141/89 131/82 129/81   Pulse: 88 88 87 87   Resp:       Temp:   98.7 °F (37.1 °C)    TempSrc:   Oral    SpO2: 99% 100% 97% 97%   Weight:       Height:           Physical Exam:  Gen: critically ill comatose gentleman, Intubated on fentanyl 50/hr   CV: RRR  GI: Abdomen soft, non-tender  Skin: Cap refill< 2 seconds, C-collar in place unable to view surgical site at this time   Neuro:  Comatose GCS 3T flaccid paralysis all 4 limbs no response to painful stimulus   PERRL 3mm bilateral, cough and gag present, corneal present, rhythmic eye movements not spontaneous or purposeful eye opening like activity of eye lids only       Labs:  Lab Results   Component Value Date    WBC 16.5 (H) 04/28/2022    HGB 10.5 (L) 04/28/2022    HCT 34.2 (L) 04/28/2022     04/28/2022    CHOL 100 08/19/2021    TRIG 82 08/19/2021    HDL 41 08/19/2021    ALT 67 (H) 04/19/2022    AST 87 (H) 04/19/2022     04/28/2022    K 3.2 (L) 04/28/2022     04/28/2022    CREATININE 0.65 (L) 04/28/2022    BUN 18 04/28/2022    CO2 28 04/28/2022    TSH 3.23 02/27/2017    PSA 1.21 04/10/2019    INR 1.0 04/19/2022    LABA1C 6.1 (H) 09/18/2020       Radiology (last 24 hours):  CT chest PE rule out 4/28/22  Impression   Isolated subsegmental emboli in the right middle and right lower lobes with a   very small clot burden.  No central pulmonary emboli.       Large left pneumothorax with marked collapse of the left lung.  Trace   leftward mediastinal shift suspicious for possible developing tension   morphology.       Dependent opacities in the lungs favored to represent a combination of   atelectasis and sequelae from aspiration.       Abnormal alignment at the recent C6-T1 ACDF with C7 corpectomy.  There is   approximately 2 cm anterior displacement of the fusion and corpectomy   hardware relative to C6 with associated mass effect on the prevertebral and   posterior pharyngeal soft tissues.       Sequelae of the recent cardiopulmonary resuscitation with associated   fractures at the sternal body, the right anterolateral 3rd through 8th ribs,   and the left anterolateral 2nd through 7th as well as the left lateral 10th   rib.  Fractures of the anterior costal cartilage on the left at ribs 5, 7,   and 8.  Associated soft tissue gas along the left anterior and lateral chest   wall.       Peripheral foci of air in the anterior liver worrisome for possible portal   venous gas. ASSESSMENT & PLAN:    Carmelita Gomes is a 75 y.o. male who presents 4/19/22 after sustaining C6-C7 fracture following mechanical fall. Patient underwent ORIF, discectomy and corpectomy with Dr. Taya Guzman 4/20 and had been extubated 4/24. Hospital course complicated by alcohol withdrawal and postoperative spinal epidural hemorrhage and soft tissue swelling without significant stenosis. Patient became altered episode of hypoxia with respiratory arrest followed by PEA arrest around 4:41AM 4/27/22. Underwent 8 rounds of CPR with Rosc around 4:55 AM since that time has been noted to be GCS of 3T. Neurosurgery re-consulted to see patient stat after incidental CT chest PE had demonstrated C6-T1 ACDF with C7 corpectomy having anterior 2cm displacement along with posterior pharyngeal soft tissue swelling.      1. S/p traumatic fracture C6-C7 unstable requiring C7 corpectomy, C6-7 and C7-T1 complete discectomy and anterior arthrodesis  2. Recent cardiac arrest with CPR 4/28 leading to 2cm anterior displacement of Fusion and corpectomy hardware     Labs and imaging were reviewed with Dr. Alexandra Parker      - head of bed 0-20 only with strict bedrest and to maintain Hard C-collar at all times   - No neurosurgical interventions planned for now although we will continue to follow patient closely and make further recommendations pending patients neurologic brain prognosis given recent PEA arrest 15 minutes CPR    - Neuro checks per NICU team  -pending MRI brain at 72 hours and is on LTME for brain prognostication        Please contact neurosurgery with any changes in patient's neurologic status.       Yuli Benavides MD  PGY-3 Neurology Resident   Neurosurgery/Neuro Critical Care Team

## 2022-04-29 NOTE — PROGRESS NOTES
Pt O2 sats starting dropping to about 85% around 0430ish, this RN reentered the room pt tachypnic and slightly coughing, this RN suctioned pt and got copious secretions back, pt continued to drop to 83-80% o2 sat, this RN then had another RN called RT to bedside and began bagging pt with ambu. Pt was lavaged by RT and got copious secretions out, ambu bagging pt in between suctioning NT, ET and lavaging. Pt saturation improved to 99% around 0440 and appears comfortable on vent. Rt at bedside will continue to monitor pt closely.

## 2022-04-29 NOTE — PROGRESS NOTES
Physical Therapy        Physical Therapy Cancel Note      DATE: 2022    NAME: Ginger Das  MRN: [id number]   : 1955      Patient not seen this date for Physical Therapy due to: Other: pt with PEA arrest, now intubated; had to have CPR, now has 2 cm anterior displacement of C6-T1 ACDF per neurosurgery progress note of . Pt on strict BR; check status Monday and continue treatment as appropriate.         Electronically signed by Kamlesh Herrera PT on 2022 at 8:44 AM

## 2022-04-29 NOTE — PROGRESS NOTES
Per Dr. Jose Coyle, patient is a coroners case. Dr. Freya Singletary gives [facility] no restrictions for organ donation.

## 2022-04-29 NOTE — PROGRESS NOTES
Comprehensive Nutrition Assessment    Type and Reason for Visit:  Reassess    Nutrition Recommendations/Plan:   1. When TF desired, recommend Immune-enhancing at 45 mL/hr plus bolus 1 bottle protein modular daily. Provides 1724 kcal, 127 g PRO. Malnutrition Assessment:  Malnutrition Status: At risk for malnutrition (Comment) (04/22/22 1047)    Context:  Acute Illness     Findings of the 6 clinical characteristics of malnutrition:  Energy Intake:  Mild decrease in energy intake (Comment)  Weight Loss:  Unable to assess     Body Fat Loss:  No significant body fat loss     Muscle Mass Loss:  No significant muscle mass loss    Fluid Accumulation:  No significant fluid accumulation     Strength:  Not Performed    Nutrition Assessment:    Pt reintubated 4/28 following arrest. TF not running at visit, d/w RN. +NGT. No propofol. Labs/meds reviewed. Nutrition Related Findings:    Labs/meds reviewed. LBM 4/25. No edema noted. Wound Type: Surgical Incision (neck)       Current Nutrition Intake & Therapies:    Average Meal Intake: NPO  Average Supplements Intake: NPO  ADULT TUBE FEEDING; Nasogastric; Immune Enhancing; Continuous; 25; Yes; 5; Q 6 hours; 65; 30; Before and after each bolus  Current Tube Feeding (TF) Orders:  · Feeding Route: Nasogastric  · Formula: Immune Enhancing  · Schedule: Continuous  · Current TF & Flush Orders Provides: 65 mL/hr will provide 2340 kcal and 146 g pro/day   · Goal TF & Flush Orders Provides: 45 mL/hr plus 1 protein modular will provide 1724 kcal, 127 g PRO    Anthropometric Measures:  Height: 6' 2\" (188 cm)  Ideal Body Weight (IBW): 190 lbs (86 kg)       Current Body Weight: 260 lb 12.9 oz (118.3 kg) ([facility] 137.3 % IBW. Weight Source: Bed Scale  Current BMI (kg/m2): 33.5                          BMI Categories: Obese Class 1 (BMI 30.0-34. 9)    Estimated Daily Nutrient Needs:  Energy Requirements Based On: Kcal/kg  Weight Used for Energy Requirements: Current  Energy (kcal/day): 1600 to 1700 kcal/day (14 kcal/kg)  Weight Used for Protein Requirements: Ideal  Protein (g/day): 130 g pro/day  Method Used for Fluid Requirements: Other (Comment)  Fluid (ml/day): per MD    Nutrition Diagnosis:   · Inadequate oral intake related to impaired respiratory function as evidenced by NPO or clear liquid status due to medical condition,nutrition support - enteral nutrition      Nutrition Interventions:   Food and/or Nutrient Delivery:  (When desired, recommend Immune-enhancing formula at 45 mL/hr plus bolus 1 bottle protein modular daily.)  Nutrition Education/Counseling: No recommendation at this time  Coordination of Nutrition Care: Continue to monitor while inpatient       Goals:  Previous Goal Met: Progress towards Goal(s) Declining  Goals: Meet at least 75% of estimated needs,by next RD assessment       Nutrition Monitoring and Evaluation:   Behavioral-Environmental Outcomes: None Identified  Food/Nutrient Intake Outcomes: Enteral Nutrition Intake/Tolerance  Physical Signs/Symptoms Outcomes: Biochemical Data,Nutrition Focused Physical Findings,Skin,Weight    Discharge Planning:     Too soon to determine     Daria Koch MS, RD, LD  Contact: [phone]

## 2022-04-29 NOTE — PROGRESS NOTES
ICU PROGRESS NOTE    PATIENT NAME: Debbi Park  MEDICAL RECORD NO. [id number]  DATE: 4/29/2022    PRIMARY CARE PHYSICIAN: Pricila Pruitt MD    HD: # 10    ASSESSMENT    Patient Active Problem List   Diagnosis    Insomnia    Moderate persistent asthma with acute exacerbation    Mixed hyperlipidemia    Adverse drug reaction    Liver disease, chronic, due to [facility] ([facility] [address]    COPD (chronic obstructive pulmonary disease) with chronic bronchitis (HCC)    Obstructive sleep apnea    Paroxysmal SVT (supraventricular tachycardia) (HCC)    Mild persistent asthma without complication    Dysphagia    History of cervical fracture    Closed fracture of cervical vertebra (HCC)    Cervical radiculopathy    Hypokalemia    Fall    Hypophosphatemia    Acute respiratory failure ([facility] [address]    Blood alcohol level of 240 mg/100 ml or more    Anemia    Cervical spine fracture (HCC)    Altered mental status    Encounter for palliative care    Cardiac arrest with successful resuscitation [facility])       MEDICAL DECISION MAKING AND PLAN    Neuro:  - GCS 6T, does not follow commands, no movement to all 4 extremities  - Pain/sedation: no sedation, fentanyl gtt, wean as tolerated with fentanyl prn pushes  - Daily ETOH use  - Thiamine and folic acid   - CTH neg for acute injuries  - CT C spine showing C6-7 fx, MRI C spine showing epidural hemorrhage at C6-7 amd prevertebral soft tissue swelling         - MRA neck showing no significant stenosis        - Intubated due to stridor, DL showing some airway edema        - 4/19 ORIF C6-7 by neurosurgery        - SBP <140 per nsg  - PEA arrest 4/28, down time 20-30 minutes, GCS 6T   - Neuro critical care following, LTME and MRI brain for prognostication   - C collar removed for reintubation during code, C spine hardware disrupted, neurosurgery following, HOB 0-20   - No movement to all 4 extremities     CV  - HR 87-88  - MAP 83-96        - No pressor requirements this am, intermittently requiring mychal        - MAP goal > 65, Vasopressin ordered if needed  - LA 0.98 (1.01)  - Hydralazine 10mg q6 prn for SBP goal <140 per nsg  - VIS 674  - No significant cardiac hx on chart  - S/p PEA arrest 4/28, 20-30 min down time  - A fib RVR post arrest, amio gtt and heparin gtt  - Cardiology following     Pulm  - Extubated 4/24, reintubated s/p PEA arrest 4/28  - PRVC 18/500/16/70%  - ABG 7.44/47.9/100/33  -   - CXR post arrest showing new hazy density DAMI may represent atelectasis, new mild left chest wall subcutaneous emphysema  - CT PE showing no PE, but large L pneumothorax  - L chest tube suction 40cc/24h, will waterseal today   - Side port outside of chest, air tight dressing placed over port  - No hx smoking or home O2 requirements     GI/Nutrition  - Diet tube feeds goals 65 ml/hr  - Ppx: protonix, glycolax  - Last BM 3 days ago x3  - CT AP showing no acute traumatic injuries, diverticulosis with jejunal intussusception, small fat containing umbilical hernia     Renal/lytes  - BUN/Cr 20/0.54  - Na/K  141/4.0  - Mg/P 1.9/2.9  - Total fluid cap 75 ml/hr, LR maintenance  - I/Os 1808/990 in last 24h, 0.3 cc/kg/hr  - 13L pos, consider fluid challenge to improve UOP     Heme  - Hb 9.7 (10.5)  - Plt  391 (431)  - DVT ppx lovenox     7.   Endocrine        - -255, insulin gtt        - 18u in last 24h        - No known hx DM, s/p PEA arrest     Micro  - Tmax 37.8  - WBC 13.1 (16.5)  - Monitor off antimicrobials     Family/dispo  - Remains in ICU  - Family updated, changed code status to DNR-CCA     Lines  - CVC, A line, ET, PIV, Smyth    CHECKLIST    CAM-ICU RASS: -2  RESTRAINTS: n/a  IVF: LR  NUTRITION: tube feeds  ANTIBIOTICS: n/a  GI: pepcid  DVT: lovenox  GLYCEMIC CONTROL: insulin gtt  HOB >45: no, keep HOB 0-20  MOBILITY: bedrest  SBT: not ready  IS: n/a    SUBJECTIVE    Antoniadeejay Rothmand had an uneventful night, no acute overnight events.  Patient is off pressors this am.     OBJECTIVE  VITALS: Temp: Temp: 100 °F (37.8 °C) (new ice applied)Temp  Av.9 °F (37.2 °C)  Min: 98 °F (36.7 °C)  Max: 100.1 °F (68.1 °C) BP Systolic (09HTI), BUCIO:755 , Min:95 , CRN:384   Diastolic (76QUA), HER:80, Min:67, Max:89   Pulse Pulse  Av  Min: 86  Max: 163 Resp Resp  Av  Min: 18  Max: 33 Pulse ox SpO2  Av.9 %  Min: 84 %  Max: 100 %    CONSTITUTIONAL: GCS 6T, opens eyes spontaneously, does not follow commands  HEENT: PERRLA  LUNGS: Bilateral breath sounds present  CV: HRRR  GI: abdomen soft  MUSCULOSKELETAL: intact  NEUROLOGIC: GCS 6T, no movement to all 4, +cough, no gag, good corneals  SKIN: intact    LAB:  CBC:   Recent Labs     22  0557 22  0937 22  0542   WBC 13.3* 16.5* 13.1*   HGB 9.8* 10.5* 9.7*   HCT 31.4* 34.2* 31.9*   MCV 99.1 97.4 100.0   PLT See Reflexed IPF Result 431 391     BMP:   Recent Labs     22  0333 22  0353 22  0557   * 143 143   K 3.3* 3.4* 3.2*    101 100   CO2 30 32* 28   BUN 15 16 18   CREATININE 0.51* 0.47* 0.65*   GLUCOSE 191* 182* 262*     RADIOLOGY:  CXR: pending final read today, appears stable without acute changes from previous done below    22  Impression   1.  Resolution of the previously seen large left-sided pneumothorax.  No   residual pneumothorax is seen.       2.  Placement of left-sided chest tube.  The tip is in the left lower   hemithorax.  The side port appears outside of the chest.       3.  Mild left basilar airspace disease favored to represent atelectasis.        Konstantin Cabrera MD  22, 6:23 AM

## 2022-04-29 NOTE — PROCEDURES
Please complete the MRI Screening Form in [facility] Please call MRI Dept. @[phone] when completed.

## 2022-04-29 NOTE — PROGRESS NOTES
Daughters did not want called with morning updates, will be in around 9 this AM, confirmed with clint 4/28 2030

## 2022-04-29 NOTE — SIGNIFICANT EVENT
Chest Tube Insertion    Date/Time: 4/28/2022 8:10 PM  Performed by: Gia Radford MD  Authorized by: Gia Radford MD   Consent: The procedure was performed in an emergent situation. Verbal consent obtained. Consent given by: Daughters. Required items: required blood products, implants, devices, and special equipment available  Patient identity confirmed: arm band  Time out: Immediately prior to procedure a \"time out\" was called to verify the correct patient, procedure, equipment, support staff and site/side marked as required. Indications: pneumothorax    Sedation:  Patient sedated: yes  Sedatives: fentanyl and propofol  Vitals: Vital signs were monitored during sedation. Anesthesia: local infiltration    Anesthesia:  Local Anesthetic: lidocaine 1% without epinephrine  Anesthetic total: 8 mL  Preparation: skin prepped with Betadine  Placement location: left anterior  Scalpel size: 11  Tube size: 28 [employer]  Dissection instrument: Cara clamp and finger  Ultrasound guidance: no  Tension pneumothorax heard: no  Tube connected to: suction  Drainage characteristics: Air rush. Suture material: 2-0 silk  Dressing: 4x4 sterile gauze and petrolatum-impregnated gauze  Post-insertion x-ray findings: tube in good position  Patient tolerance: patient tolerated the procedure well with no immediate complications  Comments: Dr. Conrado Morataya immediately available during procedure.

## 2022-04-30 NOTE — FLOWSHEET NOTE
[facility]   Patient Death Note  DEATH   Shift date: 22   Shift day: Saturday  Shift #: 1                 Room # 2475/4027-52   Name: Kelley Webster            Age: 75 y.o. Gender: male          Yazdanism: None      Place of Druze: None  Admit Date & Time: 2022  7:31 AM     Referral: Nurse  Actual date of death: 22   TOD: 13:07PM       SITUATION AT DEATH:  Cardiac arrest which led to organ donation. IS THIS A 'S CASE? Yes    SPIRITUAL/EMOTIONAL INTERVENTION:  Ga Crandall was notified that patient was end of life and [facility] would be administering organ donation.  followed up with family and distributed a prayer blanket, grief packet, and ministry of presence.  also read from the Organ donation script to honor the patient and read a organ donation poem which bring hope and encouragement to the family.  also did the honor walk with medical staff and family present.  will follow up with family as needed. Family Received Grief Packet? Yes       NAME AND PHONE NUMBER OF DOCTOR SIGNING DEATH CERTIFICATE: Dr. Cecilio Gomez [phone]      The 's office will come and  the  from [address] [place] [address]. Copy of COMPLETED Release of Body Form Received? Yes    Patient's belongings: With family      HOME:  Name: [address] [place] [address] Drive: Unknown  Phone Number: Unknown    NEXT OF KIN:  Name: Iona Wood  Relationship: Daughter  Street Address: [address] [place] [address] [address] [facility]: [facility]-: [place]  Zip code: [address]   Phone Number: [phone]    [facility]? Yes    IF SO, WHAT?   No    Electronically signed by Nicolás Laughlin Resident, on 2022 at 11:02 AM.  [address]  [phone]       22 1058   Encounter Summary   Encounter Overview/Reason  Crisis   Service Provided For: Family   Referral/Consult From: Nurse   Support System Children   Last Encounter  04/30/22   Complexity of Encounter High   Begin Time 1038   End Time  1145   Total Time Calculated 67 min   Encounter    Type Family Care   Crisis   Type Follow up;Emotional distress; Family Care   Spiritual/Emotional needs   Type Spiritual Support;Emotional Distress   Grief, Loss, and Adjustments   Type Death;Grief and loss; End of Life;Bereavement Care   Assessment/Intervention/Outcome   Assessment Despair; Loneliness; Shock;Sad;Tearful;Unable to assess   Intervention Active listening;Sustaining Presence/Ministry of presence; End of Life Care;Grief Care   Outcome Acceptance;Comfort;Grieving;Receptive   Plan and Referrals   Plan/Referrals Assisted with grief resources;Continue to visit, (comment)

## 2022-04-30 NOTE — PROGRESS NOTES
PROGRESS NOTE    PATIENT NAME: Kellie Downey  MEDICAL RECORD NO. [id number]  DATE: 2022  SURGEON: Dr. Anamika Mason: Alyssa Cabrera MD    HD: # 11    ASSESSMENT    Patient Active Problem List   Diagnosis    Insomnia    Moderate persistent asthma with acute exacerbation    Mixed hyperlipidemia    Adverse drug reaction    Liver disease, chronic, due to alcohol ([facility] [address]    COPD (chronic obstructive pulmonary disease) with chronic bronchitis (HCC)    Obstructive sleep apnea    Paroxysmal SVT (supraventricular tachycardia) (HCC)    Mild persistent asthma without complication    Dysphagia    History of cervical fracture    Closed fracture of cervical vertebra (HCC)    Cervical radiculopathy    Hypokalemia    Fall    Hypophosphatemia    Acute respiratory failure ([facility] [address]    Blood alcohol level of 240 mg/100 ml or more    Anemia    Cervical spine fracture (HCC)    Altered mental status    Encounter for palliative care    Cardiac arrest with successful resuscitation ([facility] [address]    Anoxic brain injury [facility])     MEDICAL DECISION MAKING AND PLAN    DNR-CC  Currently DNR-CCA with [facility]  Organ donation planning for today at 12pm  Labs obtained  Palliative care following    OBJECTIVE  VITALS: Temp: Temp: 98.7 °F (37.1 °C)Temp  Av.7 °F (37.6 °C)  Min: 98.7 °F (37.1 °C)  Max: 100.9 °F (59.8 °C) BP Systolic (61GJE), OLS:716 , Min:123 , LYF:401   Diastolic (82ICQ), UPX:20, Min:63, Max:95   Pulse Pulse  Av.1  Min: 73  Max: 90 Resp Resp  Av.8  Min: 16  Max: 22 Pulse ox SpO2  Av.9 %  Min: 95 %  Max: 100 %  GENERAL: Comfortable appearing, intubated and comfortably sedated  NEURO: Resting comfortably  LUNGS: chest clear bilaterally  HEART: normal rate and regular rhythm  ABDOMEN: soft, non-tender and non-distended  EXTREMITY: intact      E Dallas Mcburney, MD  22, 8:52 AM

## 2022-04-30 NOTE — PROGRESS NOTES
PALLIATIVE CARE PROGRESS NOTE     Patient: Brenda Banegas    Room: 1009/1009-01    Reason For Consult:  Goals of care  Family support    Palliative Interaction:  I was updated by Dr. Verona Luevano (Neurology) on the CT findings of complete resection of C6-7. He had already discussed in detail these findings with the family and, after discussions with them, the daughters had decided to make the patient [facility] with terminal extubation. I met with them and did confirm that these were still their wishes. They were tearful but did have some relief since the CT findings had helped make the decision easier. They knew the patient would not want to be bed-bound the rest of his life if he were to wake. Up. They knew if he could speak for himself now, that he would not want to continue with all these aggressive treatments if it would mean losing his independence. They agreed with [facility]. I updated the nurse on our discussion and also the . IMPRESSION/ PLAN  1- Symptom management/ pain control   We feel the patient symptomsare being controlled. his current regimen is reviewed by myself and discussed with the staff. We recommend adjusting his medications as follows  - comfort medications placed for terminal extubation. - the daughters were waiting until all other family members said their goodbyes before terminal extubation. Nurse updated on this plan. - Goals of care evaluation   The patient goals of care are preparation for death, achievement of a peaceful death and support for family/caregiver  Long discussion to ensure his understanding of goals of care, and theconcept of palliative care. The family's understanding of his goals of care were reviewed.      3-Code Status:  DNR-CCA    4-OtherRecommendations:        __________________________________________________________________    HISTORY OF PRESENT ILLNESS:   The patient is a 75 y.o. male with a PMHx of alcohol abuse presented following a fall down 15 steps. It resulted in fractures of C6/7 facets C6/7 disc rupture. He underwent ORIF of C6/7 on 22. Unfortunately, he developed respiratory distress and this morning, went into PEA arrest. He was down for about 15 minutes before ROSC was achieved. Currently, remains unresponsive off sedation. Awaiting Neuro-Critical care evaluation since he was having myoclonal jerking post arrest.      OVERNIGHT EVENTS:  No acute events overnight  Remains unresponsive off sedation. ROS: Could not obtain as the patient is intubated and unresponsive        Vital Signs:  /71   Pulse 88   Temp 99.4 °F (37.4 °C) (Oral)   Resp 20   Ht 6' 2\" (1.88 m)   Wt 260 lb 12.9 oz (118.3 kg)   SpO2 96%   PF (!) 18 L/min   BMI 33.49 kg/m²     Pertinent Laboratory StudiesReviewed by Me Personally Today:  Lab Results   Component Value Date    WBC 13.1 (H) 2022    HGB 9.7 (L) 2022    HCT 31.9 (L) 2022    .0 2022     2022     Lab Results   Component Value Date     2022    K 4.0 2022     2022    CO2 31 2022    BUN 20 2022    CREATININE 0.54 2022    GLUCOSE 180 2022    GLUCOSE 110 2012    CALCIUM 8.7 2022         has a past medical history of Asthma, Calculus of gallbladder without mention of cholecystitis or obstruction, Chronic back pain, Hyperlipidemia, Insomnia, unspecified, Spinal stenosis, unspecified region other than cervical, and Urinary incontinence. History reviewed. No pertinent family history. Social History     Tobacco Use    Smoking status: Former Smoker     Packs/day: 1.00     Years: 24.00     Pack years: 24.00     Types: Cigarettes     Quit date:      Years since quittin.3    Smokeless tobacco: Never Used   Vaping Use    Vaping Use: Never used   Substance Use Topics    Alcohol use: Yes     Comment: daily drinker (vodka)    Drug use: No       Physical Exam  Vitals and nursing note reviewed. Constitutional:       Comments: Intubated   HENT:      Head: Normocephalic and atraumatic. Cardiovascular:      Rate and Rhythm: Normal rate and regular rhythm. Pulmonary:      Effort: No respiratory distress. Breath sounds: No wheezing. Comments: Mechanically ventilated, decreased bibasilar breath sounds  Abdominal:      Palpations: Abdomen is soft. Musculoskeletal:      Cervical back: Neck supple. Skin:     General: Skin is warm and dry. Neurological:      Comments: Intubated. Unresponsive off sedation. Opens eyes, not to command          Palliative Performance Scale:  ___100% Full ambulation; normal activity/No disease; full self-care; normal intake; LOC full  ___90% full ambulation; normal activity/some disease; full self-care; normal intake; LOC full  ___80% ambulation full; normal activity with effort/some disease; full self-care; normal/reduced intake; LOC full  ___70% ambulation reduced; cannot do normal work/some disease; full self-care; normal/reduce intake; LOC full  ___60%  Ambulation reduced; Significant disease; Can't do hobbies/housework; intake normal or reduced; occasional assist; LOC full/confusion  ___50%  Mainly sit/lie; Extensive disease; Can't do any work; Considerable assist; intake normal or reduced; LOC full/confusion  ___40%  Mainly in bed; Extensive disease; Mainly assist; intake normal or reduced; LOC full/confusion   ___30%  Bed Bound; Extensive disease; Total care; intake reduced; LOC full/confusion  ___20%  Bed Bound; Extensive disease; Total care; intake minimal; Drowsy/coma  _x_10%  Bed Bound; Extensive disease;  Total care; Mouth care only; Drowsy/coma  ___0       Death        Principle Problem/Diagnosis:  Principal Problem:    History of cervical fracture  Active Problems:    Hypokalemia    Fall    Hypophosphatemia    Acute respiratory failure (HCC)    Anemia    Cervical spine fracture (HCC)    Altered mental status    Encounter for palliative care    Cardiac arrest with successful resuscitation ([facility] [address]    Anoxic brain injury ([facility] [address]    Blood alcohol level of 240 mg/100 ml or more  Resolved Problems:    * No resolved hospital problems.  *        Electronically signed by      Rodrick Peters MD  Hospice/Palliative Care Fellow  [place], [place]  4/29/2022 11:40 PM  Palliative Care Office [phone]  Palliative Care Cell Phone: [phone]

## 2022-04-30 NOTE — PROCEDURES
Referring physician: Dr. Anne Perkins  Date: 4/29/2022  Start Time:4/29/2022 @ 1800  End Time: 4/29/2022 @ 0731 40 [phone] 23    Indication  Patient with encephalopathy, EEG done to rule out subclinical seizures. Introduction  This continuous video-EEG was acquired using a [employer] workstation at 256 samples/s. Electrodes were placed according to the [employer] 10-20 system. Automated spike and seizure detection algorithms were applied. Video was recorded during this study. Description  The background consistent of continuous generalized spike/polyspikes periodic discharges at 1-2 Hz, by the end of the study at 2 Hz superimposed of attenuation. No consistent focal slowing or interhemispheric asymmetry was noted. Normal sleep structures were not observed. @ 0900 GPDs became at 2 HZ  @  AM GPDs became at 3-4 Hz  @10-11 PM briefly GPDs @ 2Hz    Events  4/28/2022: clinical random abdominal movements, did not correlate with GPDs. Unclear significance, later the study no recurrence. 4/29/2022: Per neurosurgery patient is having rhythmic shoulder twitching. No very clear on video. Impression  Abnormal continuous vEEG recording, the slowing mentioned above suggests severe non specific encephalopathy. GPDs at 2-4 Hz considered in ictal-interictal spectrum. According to [place] criteria this concerning status epilepticus, this pattern can be/is seen in severe/diffuse anoxic brain injury. However clinical correlation is recommended. Discussed with ICU team.    No major changes from previous EEG. Laura Marcum MD  Epilepsy Board Certified. Neurology Board Certified.     Electronically Signed

## 2022-04-30 NOTE — DISCHARGE SUMMARY
DISCHARGE SUMMARY:    PATIENT NAME:  Clay Dunbar  YOB: 1955  MEDICAL RECORD NO. [id number]  DATE: 22  PRIMARY CARE PHYSICIAN: Shikha Parada MD  ADMIT DATE:  2022    DISCHARGE DATE:   22   DISPOSITION:  , time of death 13:12  ADMITTING DIAGNOSIS:   C6-7 inferior articular process fxs    DIAGNOSIS:   Patient Active Problem List   Diagnosis    Insomnia    Moderate persistent asthma with acute exacerbation    Mixed hyperlipidemia    Adverse drug reaction    Liver disease, chronic, due to alcohol ([facility] [address]    COPD (chronic obstructive pulmonary disease) with chronic bronchitis (HCC)    Obstructive sleep apnea    Paroxysmal SVT (supraventricular tachycardia) (HCC)    Mild persistent asthma without complication    Dysphagia    History of cervical fracture    Closed fracture of cervical vertebra (HCC)    Cervical radiculopathy    Hypokalemia    Fall    Hypophosphatemia    Acute respiratory failure (HCC)    Blood alcohol level of 240 mg/100 ml or more    Anemia    Cervical spine fracture (HCC)    Altered mental status    Encounter for palliative care    Cardiac arrest with successful resuscitation ([facility] [address]    Anoxic brain injury ([facility] [address]     CONSULTANTS:  Neurosurgery, neuro critical care    PROCEDURES:   Intubation  CVC  Arterial line  C6-7 ACDF by neurosurgery    HOSPITAL COURSE:   Clay Dunbar is a 75 y.o. male who was admitted on 2022  Hospital Course:    Injuries: C6-7 inferior articular process Fxs    : OR w/ NS C6-7 ACDF; : Failed SBT, agitation. Subfascial drain removed by NS.  : replaced electrolytes, Start Lindsey, Keep APRV/sedation;   : started seroquel  : CPAP, increased seroquel, weaned off IV sedation, 40 lasix;   : Extubated  : Off precedex, received Ativan, high-flow, back on precedex overnight;    off precedex, increased seroquel and valium, back on precedex overnight  : DC precedex. DC fent. DC prn valium. Decrease markie dose. Replace K. Change seroquel to 50/150. Tachy, responsive to 1L bolus  : PEA arrest. Intubated. EKG shows AFib with RVR-start amio bolus with gtt. place leroy. Start propofol. Start insulin gtt. Start heparin gtt. DC robaxin, neurontin, trazodone, seroquel, valium. Cardiology at bedside-cardioverted. Digoxin IV given. Changed Levo to Juan. CT placed after chest CT showed pneumothorax. CT shows sternal and rib fractures-from CPR. DNR-CCA   DNR-CC. EEG shows status epilepticus   DNR-CCA with [facility] for organ donation    Labs and imaging were followed daily. He was deemed medically stable for discharged to         PHYSICAL EXAMINATION:        Discharge Vitals:  height is 6' 2\" (1.88 m) and weight is 260 lb 12.9 oz (118.3 kg). His oral temperature is 98.7 °F (37.1 °C). His blood pressure is 138/72 and his pulse is 82. His respiration is 20 and oxygen saturation is 100%. Exam on day of discharge:    GENERAL: Comfortable appearing, intubated and comfortably sedated  NEURO: Resting comfortably  LUNGS: chest clear bilaterally  HEART: normal rate and regular rhythm  ABDOMEN: soft, non-tender and non-distended  EXTREMITY: intact    LABS:     Recent Labs     22  0557 22  0557 22  0937 22  0542 22  0301   WBC 13.3*   < > 16.5* 13.1* 16.0*   HGB 9.8*   < > 10.5* 9.7* 9.1*   HCT 31.4*   < > 34.2* 31.9* 28.9*   PLT See Reflexed IPF Result   < > 431 391 412     --   --  141 139   K 3.2*  --   --  4.0 4.1     --   --  101 100   CO2 28  --   --  31 29   BUN 18  --   --  20 20   CREATININE 0.65*  --   --  0.54* 0.46*    < > = values in this interval not displayed. DIAGNOSTIC TESTS:    XR SHOULDER RIGHT (MIN 2 VIEWS)    Result Date: 2022  EXAMINATION: THREE XRAY VIEWS OF THE RIGHT SHOULDER 2022 7:43 am COMPARISON: None.  HISTORY: ORDERING SYSTEM PROVIDED HISTORY: Fall TECHNOLOGIST PROVIDED HISTORY: Fall 59-year-old male with history of fall FINDINGS: Moderate degenerative changes of the right glenohumeral joint. Mild degenerative change of the right AC joint. Visualized right-sided ribs appear intact. No acute fracture or dislocation. 1. Moderate right glenohumeral osteoarthrosis. 2. Mild degenerative changes of the right AC joint. No acute fracture or dislocation. XR ACUTE ABD SERIES CHEST 1 VW    Result Date: 4/19/2022  EXAMINATION: TWO XRAY VIEWS OF THE ABDOMEN AND SINGLE  XRAY VIEW OF THE CHEST 4/19/2022 8:46 am COMPARISON: None. HISTORY: ORDERING SYSTEM PROVIDED HISTORY: preop TECHNOLOGIST PROVIDED HISTORY: preop FINDINGS: The lungs are without consolidation or effusion. There is no pneumothorax. The mediastinal structures are unremarkable. The extrathoracic soft tissues are unremarkable. There is a nonobstructive bowel gas pattern. There is no free air. There are no suspicious calcifications. There is no acute osseous abnormality. The surrounding soft tissues are unremarkable. No acute cardiopulmonary process. Nonobstructive bowel gas pattern. CT HEAD WO CONTRAST    Result Date: 4/19/2022  EXAMINATION: CT OF THE HEAD WITHOUT CONTRAST  4/19/2022 4:50 am TECHNIQUE: CT of the head was performed without the administration of intravenous contrast. Dose modulation, iterative reconstruction, and/or weight based adjustment of the mA/kV was utilized to reduce the radiation dose to as low as reasonably achievable. COMPARISON: 06/22/2014 HISTORY: ORDERING SYSTEM PROVIDED HISTORY: fall etoh TECHNOLOGIST PROVIDED HISTORY: fall etoh Decision Support Exception - unselect if not a suspected or confirmed emergency medical condition->Emergency Medical Condition (MA) Reason for Exam: fall etoh Additional signs and symptoms: PT STATES HE FELL DOWN 16 STEPS FINDINGS: BRAIN/VENTRICLES: The cerebral and cerebellar parenchyma demonstrate volume loss.   Scattered low-attenuation areas are noted supratentorially, compatible with chronic microvascular white matter ischemic disease. There are no areas of hemorrhage, mass, or midline shift. No abnormal extra-axial fluid collections. The ventricles are prominent in size, likely related to involutional change, stable. Gray-white differentiation is maintained without evidence of an acute infarct. The there is a probable small chronic infarct involving the inferior right cerebellar hemisphere. ORBITS: The visualized portion of the orbits demonstrate no acute abnormality. SINUSES: The paranasal sinuses and mastoid air cells are well aerated. SOFT TISSUES/SKULL:  No acute abnormality of the visualized skull or soft tissues. 1. No acute intracranial abnormality. 2. Cerebral parenchymal volume loss with chronic microvascular white matter ischemic disease. 3. 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 CT CERVICAL SPINE WO CONTRAST    Result Date: 4/19/2022  EXAMINATION: CT OF THE CERVICAL SPINE WITHOUT CONTRAST 4/19/2022 4:50 am TECHNIQUE: CT of the cervical spine was performed without the administration of intravenous contrast. Multiplanar reformatted images are provided for review. Dose modulation, iterative reconstruction, and/or weight based adjustment of the mA/kV was utilized to reduce the radiation dose to as low as reasonably achievable. COMPARISON: None. HISTORY: ORDERING SYSTEM PROVIDED HISTORY: fall EtOH TECHNOLOGIST PROVIDED HISTORY: fall EtOH Decision Support Exception - unselect if not a suspected or confirmed emergency medical condition->Emergency Medical Condition (MA) Reason for Exam: fall EtOH Additional signs and symptoms: PT STATES HE FELL DOWN 16 STEPS FINDINGS: BONES/ALIGNMENT: There is normal alignment of the cervical spine. There is an acute fracture noted through the right C6 inferior articular process and right C7 superior articular process. No other fractures are identified.  DEGENERATIVE CHANGES: There is multilevel degenerative disc disease noted in the cervical spine, greatest at C4-C5 through C6-C7. There is asymmetric right-sided facet arthropathy at C3-C4 with fusion across the facet joint. Asymmetric right-sided foraminal narrowing is noted at C3-C4. SOFT TISSUES: There is paraseptal emphysema and centrilobular emphysema. The thyroid gland is unremarkable. 1. Acute fractures noted through the right C6 inferior articular process and right C7 superior articular process. 2. No other fractures are identified. CT THORACIC SPINE WO CONTRAST    Result Date: 4/19/2022  EXAMINATION: CT OF THE CHEST, ABDOMEN, AND PELVIS WITH CONTRAST; CT OF THE THORACIC SPINE WITHOUT CONTRAST; CT OF THE LUMBAR SPINE WITHOUT CONTRAST, 4/19/2022 5:20 am; 4/19/2022 4:50 am TECHNIQUE: CT of the chest, abdomen and pelvis was performed with the administration of intravenous contrast. Multiplanar reformatted images are provided for review. Dose modulation, iterative reconstruction, and/or weight based adjustment of the mA/kV was utilized to reduce the radiation dose to as low as reasonably achievable.; CT of the thoracic spine was performed without the administration of intravenous contrast. Multiplanar reformatted images are provided for review. Dose modulation, iterative reconstruction, and/or weight based adjustment of the mA/kV was utilized to reduce the radiation dose to as low as reasonably achievable.; CT of the lumbar spine was performed without the administration of intravenous contrast. Multiplanar reformatted images are provided for review. Adjustment of mA and/or kV according to patient size was utilized. Dose modulation, iterative reconstruction, and/or weight based adjustment of the mA/kV was utilized to reduce the radiation dose to as low as reasonably achievable. COMPARISON: None.  HISTORY: ORDERING SYSTEM PROVIDED HISTORY: fall etoh TECHNOLOGIST PROVIDED HISTORY: fall etoh Decision Support Exception - unselect if not a suspected or confirmed emergency medical condition->Emergency Medical Condition (MA) Reason for Exam: fall, etoh Additional signs and symptoms: PT STATES HE FELL DOWN 16 STEPS; ORDERING SYSTEM PROVIDED HISTORY: fall etoh TECHNOLOGIST PROVIDED HISTORY: fall etoh Reason for Exam: fall, etoh, pain Additional signs and symptoms: PT STATES HE FELL DOWN 16 STEPS; ORDERING SYSTEM PROVIDED HISTORY: fall etoh TECHNOLOGIST PROVIDED HISTORY: fall etoh Decision Support Exception - unselect if not a suspected or confirmed emergency medical condition->Emergency Medical Condition (MA) Reason for Exam: fal, etoh Additional signs and symptoms: PT STATES HE FELL DOWN 16 STEPS FINDINGS: CTA CHEST: Thoracic aorta: No thoracic aortic aneurysm is identified. Thoracic aortic ectasia measuring 4.5 cm. No great vessel stenosis is identified. Pulmonary arteries: Central pulmonary arteries appear grossly unremarkable, with normal size and no definite emboli though poorly evaluated given the degree of contrast opacification. Mediastinum: No mediastinal hematoma is identified. Coronary artery atherosclerosis. Minimal fluid in the pericardial recesses. No mediastinal lymphadenopathy is identified. No focal esophageal thickening. Thyroid gland appears small but otherwise unremarkable. Lungs: No pleural effusion is identified. Respiratory motion artifact. Subsegmental atelectasis. No spiculated lung mass. Minimal secretions noted within the trachea and bronchi. No traumatic laceration or pneumothorax. Chest wall/osseous structures: No axillary or supraclavicular lymphadenopathy is identified. No rib fracture is identified. No osseous destructive process is identified. No acute thoracic spine vertebral body fracture. CTA ABDOMEN: Organs: Diffuse severe hepatic steatosis. No splenic mass is identified. The adrenal glands, pancreas, gallbladder, and kidneys show no traumatic injury or neoplastic process. No inflammatory change. GI/bowel: Normal appendix. No ileus or obstruction. Colonic diverticulosis is identified. A jejunal intussusception is seen in the left mid abdomen, likely a transient finding without significant proximal obstruction. Peritoneum/retroperitoneum: No aortic aneurysm. No dissection. No mesenteric artery injury is identified. No retroperitoneal or mesenteric lymphadenopathy is identified. Tortuous abdominal aorta. Fat containing umbilical hernia. CTA PELVIS: Vascular: The iliac vasculature appears intact. No hemorrhage is identified. No acute injury. Nonvascular: The bladder appears unremarkable. The plastic gland appears grossly unremarkable as well. No free fluid or pelvic lymphadenopathy is identified. Soft tissue/osseous structures: Spine report below. Sacroiliac joints appear intact. No pelvic fracture is identified. The proximal femurs appear intact. THORACIC/LUMBAR SPINE: Thoracic spine: No thoracic spine vertebral body fracture. Multilevel mild-to-moderate degenerative disc disease. Posterior vertebral body alignment appears intact. No osseous erosive changes are identified. Transverse processes appear intact. Lumbar spine: Multilevel degenerative disc disease seen in the lumbar spine, greatest at the level of L2-L3 and L3-L4, with minimal retrolisthesis of L3 on L4 with a small degree of anterior spinal canal narrowing. Mild narrowing at the L5-S1 level, with bilateral pars defects of L5 though no significant L5 spondylolisthesis. 1. No acute osseous fracture identified within the chest, abdomen or pelvis. The thoracic and lumbar spine appear intact, with multilevel degenerative changes though no acute compression fracture. 2. Thoracic aortic ectasia measuring 4.5 cm. 3. Diffuse atherosclerotic disease including coronary artery involvement. 4. No acute parenchymal process seen within the lungs. Minimal secretions noted within the central airways. 5. Diffuse severe hepatic steatosis.  6. Diverticulosis. 7. Jejunal intussusception seen in the left mid abdomen, likely a transient finding and often seen on CT imaging studies. 8. Small fat containing umbilical hernia. CT LUMBAR SPINE WO CONTRAST    Result Date: 4/19/2022  EXAMINATION: CT OF THE CHEST, ABDOMEN, AND PELVIS WITH CONTRAST; CT OF THE THORACIC SPINE WITHOUT CONTRAST; CT OF THE LUMBAR SPINE WITHOUT CONTRAST, 4/19/2022 5:20 am; 4/19/2022 4:50 am TECHNIQUE: CT of the chest, abdomen and pelvis was performed with the administration of intravenous contrast. Multiplanar reformatted images are provided for review. Dose modulation, iterative reconstruction, and/or weight based adjustment of the mA/kV was utilized to reduce the radiation dose to as low as reasonably achievable.; CT of the thoracic spine was performed without the administration of intravenous contrast. Multiplanar reformatted images are provided for review. Dose modulation, iterative reconstruction, and/or weight based adjustment of the mA/kV was utilized to reduce the radiation dose to as low as reasonably achievable.; CT of the lumbar spine was performed without the administration of intravenous contrast. Multiplanar reformatted images are provided for review. Adjustment of mA and/or kV according to patient size was utilized. Dose modulation, iterative reconstruction, and/or weight based adjustment of the mA/kV was utilized to reduce the radiation dose to as low as reasonably achievable. COMPARISON: None.  HISTORY: ORDERING SYSTEM PROVIDED HISTORY: fall etoh TECHNOLOGIST PROVIDED HISTORY: fall etoh Decision Support Exception - unselect if not a suspected or confirmed emergency medical condition->Emergency Medical Condition (MA) Reason for Exam: fall, etoh Additional signs and symptoms: PT STATES HE FELL DOWN 16 STEPS; ORDERING SYSTEM PROVIDED HISTORY: fall etoh TECHNOLOGIST PROVIDED HISTORY: fall etoh Reason for Exam: fall, etoh, pain Additional signs and symptoms: PT STATES HE FELL DOWN 16 STEPS; ORDERING SYSTEM PROVIDED HISTORY: fall etoh TECHNOLOGIST PROVIDED HISTORY: fall etoh Decision Support Exception - unselect if not a suspected or confirmed emergency medical condition->Emergency Medical Condition (MA) Reason for Exam: fal, etoh Additional signs and symptoms: PT STATES HE FELL DOWN 16 STEPS FINDINGS: CTA CHEST: Thoracic aorta: No thoracic aortic aneurysm is identified. Thoracic aortic ectasia measuring 4.5 cm. No great vessel stenosis is identified. Pulmonary arteries: Central pulmonary arteries appear grossly unremarkable, with normal size and no definite emboli though poorly evaluated given the degree of contrast opacification. Mediastinum: No mediastinal hematoma is identified. Coronary artery atherosclerosis. Minimal fluid in the pericardial recesses. No mediastinal lymphadenopathy is identified. No focal esophageal thickening. Thyroid gland appears small but otherwise unremarkable. Lungs: No pleural effusion is identified. Respiratory motion artifact. Subsegmental atelectasis. No spiculated lung mass. Minimal secretions noted within the trachea and bronchi. No traumatic laceration or pneumothorax. Chest wall/osseous structures: No axillary or supraclavicular lymphadenopathy is identified. No rib fracture is identified. No osseous destructive process is identified. No acute thoracic spine vertebral body fracture. CTA ABDOMEN: Organs: Diffuse severe hepatic steatosis. No splenic mass is identified. The adrenal glands, pancreas, gallbladder, and kidneys show no traumatic injury or neoplastic process. No inflammatory change. GI/bowel: Normal appendix. No ileus or obstruction. Colonic diverticulosis is identified. A jejunal intussusception is seen in the left mid abdomen, likely a transient finding without significant proximal obstruction. Peritoneum/retroperitoneum: No aortic aneurysm. No dissection. No mesenteric artery injury is identified.   No retroperitoneal or mesenteric lymphadenopathy is identified. Tortuous abdominal aorta. Fat containing umbilical hernia. CTA PELVIS: Vascular: The iliac vasculature appears intact. No hemorrhage is identified. No acute injury. Nonvascular: The bladder appears unremarkable. The plastic gland appears grossly unremarkable as well. No free fluid or pelvic lymphadenopathy is identified. Soft tissue/osseous structures: Spine report below. Sacroiliac joints appear intact. No pelvic fracture is identified. The proximal femurs appear intact. THORACIC/LUMBAR SPINE: Thoracic spine: No thoracic spine vertebral body fracture. Multilevel mild-to-moderate degenerative disc disease. Posterior vertebral body alignment appears intact. No osseous erosive changes are identified. Transverse processes appear intact. Lumbar spine: Multilevel degenerative disc disease seen in the lumbar spine, greatest at the level of L2-L3 and L3-L4, with minimal retrolisthesis of L3 on L4 with a small degree of anterior spinal canal narrowing. Mild narrowing at the L5-S1 level, with bilateral pars defects of L5 though no significant L5 spondylolisthesis. 1. No acute osseous fracture identified within the chest, abdomen or pelvis. The thoracic and lumbar spine appear intact, with multilevel degenerative changes though no acute compression fracture. 2. Thoracic aortic ectasia measuring 4.5 cm. 3. Diffuse atherosclerotic disease including coronary artery involvement. 4. No acute parenchymal process seen within the lungs. Minimal secretions noted within the central airways. 5. Diffuse severe hepatic steatosis. 6. Diverticulosis. 7. Jejunal intussusception seen in the left mid abdomen, likely a transient finding and often seen on CT imaging studies. 8. Small fat containing umbilical hernia.      MRI CERVICAL SPINE WO CONTRAST    Result Date: 4/19/2022  EXAMINATION: MRI OF THE CERVICAL SPINE WITHOUT CONTRAST 4/19/2022 1:15 pm TECHNIQUE: Multiplanar multisequence MRI of the cervical spine was performed without the administration of intravenous contrast. COMPARISON: No MRI comparison available. Correlation with 04/19/2022 CT. HISTORY: ORDERING SYSTEM PROVIDED HISTORY: C6, preop plan TECHNOLOGIST PROVIDED HISTORY: C6, preop plan Decision Support Exception - unselect if not a suspected or confirmed emergency medical condition->Emergency Medical Condition (MA) Reason for Exam: Pre-op planning C6 Fracture. FINDINGS: BONES/ALIGNMENT: Straightening of the cervical spine without significant spondylolisthesis. Asymmetric right C6-C7 facet joint fluid distension with surrounding edema compatible with previously seen fractures on same day CT. Additionally, the C6-C7 ligamentum flavum and posterior longitudinal ligament appear torn with fluid distension of the C6-C7 intervertebral space. Subtle marrow edema along the C7 anterior superior corner raise the possibility of an acute injury, possibly torn anterior longitudinal ligament or fractured osteophyte. The remaining vertebral body heights appear preserved. Mild narrowing of the remaining intervertebral disc spaces. SPINAL CORD: No convincing abnormal spinal cord signal. SOFT TISSUES: Cervicothoracic prevertebral soft tissue edema/fluid present measuring up to 0.6 cm in thickness. Posterior paraspinal soft tissue edema present in the cervical levels, including in the interspinous spaces. C2-C3: No significant thecal sac stenosis or foraminal narrowing. C3-C4: Uncovertebral and facet hypertrophy, worse on the right. No significant thecal sac stenosis. Moderate right foraminal narrowing. C4-C5: Small disc osteophyte complex. Facet hypertrophy. No significant thecal sac stenosis. Mild left foraminal narrowing. C5-C6: Mild posterior epidural hemorrhage. Small disc osteophyte complex. Ligamentum flavum thickening. Uncovertebral and facet hypertrophy. Mild thecal sac stenosis.   Mild right and moderate left foraminal narrowing. C6-C7: Anterior and posterior epidural hemorrhage. Small disc osteophyte complex. Facet fractures and edema on right. Severe thecal sac stenosis. Moderate right foraminal narrowing. C7-T1: Mild posterior epidural hemorrhage. Small disc bulge. Mild thecal sac stenosis. No significant foraminal narrowing. 1. Acute traumatic injury at the C6-C7 level with right-sided facet fractures, torn ligamentum flavum and posterior longitudinal ligament, and possibly torn anterior longitudinal ligament versus osteophyte fracture. No significant spondylolisthesis. 2. Epidural hemorrhage centered the C6-C7 level results in severe thecal sac stenosis. No convincing abnormal spinal cord signal. 3. Cervicothoracic prevertebral soft tissue edema/fluid present, measuring up to 0.6 cm in thickness. 4. Posterior paraspinal soft tissue edema present in the cervical levels, including in the interspinous spaces. Preliminary findings discussed with ordering clinician Dr. Nat Freeman on 04/19/2022 at 3:26 p.m. XR CHEST PORTABLE    Result Date: 4/20/2022  EXAMINATION: ONE XRAY VIEW OF THE CHEST 4/20/2022 5:22 am COMPARISON: 04/19/2022 HISTORY: ORDERING SYSTEM PROVIDED HISTORY: Post op cxr TECHNOLOGIST PROVIDED HISTORY: Post op cxr Reason for Exam: post et tube placement   upright port FINDINGS: The cardiac silhouette and mediastinal contours are stable. Orogastric tube tip terminates below the diaphragm. The endotracheal tube tip is located 9.7 cm above the lisa. This could be advanced 3-4 cm for more optimal positioning. There are areas of increasing atelectasis in the right lung base. The lung volumes are low. No pneumothorax. The visualized osseous structures are unremarkable. 1. The endotracheal tube tip is located 9.7 cm above the lisa. This could be advanced approximately 3-4 cm for more optimal placement. 2. Worsening atelectasis in the right lung base.  3. The orogastric tube tip now terminates below the diaphragm, incompletely imaged. XR CHEST PORTABLE    Result Date: 4/19/2022  EXAMINATION: ONE XRAY VIEW OF THE CHEST 4/19/2022 12:02 pm COMPARISON: None. HISTORY: ORDERING SYSTEM PROVIDED HISTORY: ET tube TECHNOLOGIST PROVIDED HISTORY: ET tube FINDINGS: The lungs are without consolidation or effusion. There is no pneumothorax. The mediastinal structures are unremarkable. The upper abdomen is unremarkable. The extrathoracic soft tissues are unremarkable. There is endotracheal tube with the tip in the midtrachea. There is a gastric tube with the tip in the mid esophagus. No acute cardiopulmonary process. Endotracheal tube in satisfactory position. Gastric tube with the tip in the mid esophagus and repositioning is recommended. XR ABDOMEN FOR NG/OG/NE TUBE PLACEMENT    Result Date: 4/19/2022  EXAMINATION: ONE SUPINE XRAY VIEW(S) OF THE ABDOMEN 4/19/2022 11:01 pm COMPARISON: 04/19/2022. HISTORY: ORDERING SYSTEM PROVIDED HISTORY: Confirmation of course of NG/OG/NE tube and location of tip of tube TECHNOLOGIST PROVIDED HISTORY: Confirmation of course of NG/OG/NE tube and location of tip of tube Portable? ->Yes Reason for Exam: ng placement   supine port FINDINGS: Tip and side port of the enteric tube are below the GE junction. Tip is over the gastric body. The bowel gas pattern is nonobstructive. No radiopaque nephrolithiasis. Degenerative changes in the lumbar spine. Enteric tube in expected position. FLUORO FOR SURGICAL PROCEDURES    Result Date: 4/19/2022  Radiology exam is complete. No Radiologist dictation. Please follow up with ordering provider. FLUORO FOR SURGICAL PROCEDURES    Result Date: 4/19/2022  Radiology exam is complete. No Radiologist dictation. Please follow up with ordering provider.      CT CHEST ABDOMEN PELVIS W CONTRAST    Result Date: 4/19/2022  EXAMINATION: CT OF THE CHEST, ABDOMEN, AND PELVIS WITH CONTRAST; CT OF THE THORACIC SPINE WITHOUT CONTRAST; CT OF THE LUMBAR SPINE WITHOUT CONTRAST, 4/19/2022 5:20 am; 4/19/2022 4:50 am TECHNIQUE: CT of the chest, abdomen and pelvis was performed with the administration of intravenous contrast. Multiplanar reformatted images are provided for review. Dose modulation, iterative reconstruction, and/or weight based adjustment of the mA/kV was utilized to reduce the radiation dose to as low as reasonably achievable.; CT of the thoracic spine was performed without the administration of intravenous contrast. Multiplanar reformatted images are provided for review. Dose modulation, iterative reconstruction, and/or weight based adjustment of the mA/kV was utilized to reduce the radiation dose to as low as reasonably achievable.; CT of the lumbar spine was performed without the administration of intravenous contrast. Multiplanar reformatted images are provided for review. Adjustment of mA and/or kV according to patient size was utilized. Dose modulation, iterative reconstruction, and/or weight based adjustment of the mA/kV was utilized to reduce the radiation dose to as low as reasonably achievable. COMPARISON: None.  HISTORY: ORDERING SYSTEM PROVIDED HISTORY: fall etoh TECHNOLOGIST PROVIDED HISTORY: fall etoh Decision Support Exception - unselect if not a suspected or confirmed emergency medical condition->Emergency Medical Condition ([place]) Reason for Exam: fall, etoh Additional signs and symptoms: PT STATES HE FELL DOWN 16 STEPS; ORDERING SYSTEM PROVIDED HISTORY: fall etoh TECHNOLOGIST PROVIDED HISTORY: fall etoh Reason for Exam: fall, etoh, pain Additional signs and symptoms: PT STATES HE FELL DOWN 16 STEPS; ORDERING SYSTEM PROVIDED HISTORY: fall etoh TECHNOLOGIST PROVIDED HISTORY: fall etoh Decision Support Exception - unselect if not a suspected or confirmed emergency medical condition->Emergency Medical Condition (MA) Reason for Exam: fal, etoh Additional signs and symptoms: PT STATES HE FELL DOWN 16 STEPS FINDINGS: CTA CHEST: Thoracic aorta: No thoracic aortic aneurysm is identified. Thoracic aortic ectasia measuring 4.5 cm. No great vessel stenosis is identified. Pulmonary arteries: Central pulmonary arteries appear grossly unremarkable, with normal size and no definite emboli though poorly evaluated given the degree of contrast opacification. Mediastinum: No mediastinal hematoma is identified. Coronary artery atherosclerosis. Minimal fluid in the pericardial recesses. No mediastinal lymphadenopathy is identified. No focal esophageal thickening. Thyroid gland appears small but otherwise unremarkable. Lungs: No pleural effusion is identified. Respiratory motion artifact. Subsegmental atelectasis. No spiculated lung mass. Minimal secretions noted within the trachea and bronchi. No traumatic laceration or pneumothorax. Chest wall/osseous structures: No axillary or supraclavicular lymphadenopathy is identified. No rib fracture is identified. No osseous destructive process is identified. No acute thoracic spine vertebral body fracture. CTA ABDOMEN: Organs: Diffuse severe hepatic steatosis. No splenic mass is identified. The adrenal glands, pancreas, gallbladder, and kidneys show no traumatic injury or neoplastic process. No inflammatory change. GI/bowel: Normal appendix. No ileus or obstruction. Colonic diverticulosis is identified. A jejunal intussusception is seen in the left mid abdomen, likely a transient finding without significant proximal obstruction. Peritoneum/retroperitoneum: No aortic aneurysm. No dissection. No mesenteric artery injury is identified. No retroperitoneal or mesenteric lymphadenopathy is identified. Tortuous abdominal aorta. Fat containing umbilical hernia. CTA PELVIS: Vascular: The iliac vasculature appears intact. No hemorrhage is identified. No acute injury. Nonvascular: The bladder appears unremarkable. The plastic gland appears grossly unremarkable as well.   No free fluid or pelvic lymphadenopathy is identified. Soft tissue/osseous structures: Spine report below. Sacroiliac joints appear intact. No pelvic fracture is identified. The proximal femurs appear intact. THORACIC/LUMBAR SPINE: Thoracic spine: No thoracic spine vertebral body fracture. Multilevel mild-to-moderate degenerative disc disease. Posterior vertebral body alignment appears intact. No osseous erosive changes are identified. Transverse processes appear intact. Lumbar spine: Multilevel degenerative disc disease seen in the lumbar spine, greatest at the level of L2-L3 and L3-L4, with minimal retrolisthesis of L3 on L4 with a small degree of anterior spinal canal narrowing. Mild narrowing at the L5-S1 level, with bilateral pars defects of L5 though no significant L5 spondylolisthesis. 1. No acute osseous fracture identified within the chest, abdomen or pelvis. The thoracic and lumbar spine appear intact, with multilevel degenerative changes though no acute compression fracture. 2. Thoracic aortic ectasia measuring 4.5 cm. 3. Diffuse atherosclerotic disease including coronary artery involvement. 4. No acute parenchymal process seen within the lungs. Minimal secretions noted within the central airways. 5. Diffuse severe hepatic steatosis. 6. Diverticulosis. 7. Jejunal intussusception seen in the left mid abdomen, likely a transient finding and often seen on CT imaging studies. 8. Small fat containing umbilical hernia. MRA NECK WO CONTRAST    Result Date: 4/19/2022  EXAMINATION: MRA OF THE NECK WITHOUT CONTRAST 4/19/2022 1:15 pm TECHNIQUE: Multiplanar multisequence MRA of the neck was performed without the administration of intravenous contrast. Stenosis of the internal carotid arteries measured using NASCET criteria. COMPARISON: None.  HISTORY: ORDERING SYSTEM PROVIDED HISTORY: preop TECHNOLOGIST PROVIDED HISTORY: preop Decision Support Exception - unselect if not a suspected or confirmed emergency medical condition->Emergency Medical Condition (MA) Reason for Exam: Pre-op planning C6 Fracture. FINDINGS: Suboptimal evaluation secondary to motion degradation. AORTIC ARCH/ARCH VESSELS: Grossly patent without significant stenosis CAROTID ARTERIES: Grossly patent without significant stenosis VERTEBRAL ARTERIES: Grossly patent without significant stenosis     Within limitations of motion degradation, the major arteries of the neck appear grossly patent without significant stenosis. If clinical concerns persist, can consider correlation with contrast enhanced CT neck. DISCHARGE INSTRUCTIONS     Discharge Medications:        Medication List      ASK your doctor about these medications    albuterol sulfate  (90 Base) MCG/ACT inhaler  inhale 1 puff by mouth and INTO THE LUNGS every 4 hours if needed for wheezing     atorvastatin 20 MG tablet  Commonly known as: LIPITOR  Take 1 tablet by mouth daily     azelastine 0.1 % nasal spray  Commonly known as: ASTELIN  1 spray by Nasal route 2 times daily Use in each nostril as directed     Breo Ellipta 200-25 MCG/INH Aepb inhaler  Generic drug: Fluticasone furoate-vilanterol  Inhale 1 puff into the lungs daily     clindamycin 150 MG capsule  Commonly known as: CLEOCIN     clobetasol 0.05 % ointment  Commonly known as: Temovate  Apply topically 2 times daily.      * ibuprofen 800 MG tablet  Commonly known as: ADVIL;MOTRIN     * ibuprofen 400 MG tablet  Commonly known as: ADVIL;MOTRIN     montelukast 10 MG tablet  Commonly known as: SINGULAIR  Take 1 tablet by mouth nightly     nystatin [id number] UNIT/ML suspension  Commonly known as: MYCOSTATIN  Take 5 mLs by mouth 4 times daily     * pantoprazole 40 MG tablet  Commonly known as: PROTONIX  take 1 tablet by mouth every morning before breakfast     * pantoprazole 40 MG tablet  Commonly known as: PROTONIX  Take 1 tablet by mouth daily     penicillin v potassium 500 MG tablet  Commonly known as: VEETID     PreviDent 5000 Enamel Protect 1.1-5 % Pste  Generic drug: Sod Fluoride-Potassium Nitrate     traZODone 150 MG tablet  Commonly known as: DESYREL  take 1 tablet by mouth at bedtime         * This list has 4 medication(s) that are the same as other medications prescribed for you. Read the directions carefully, and ask your doctor or other care provider to review them with you.                 DISPOSITION: , time of death 13:12    SIGNED:  Suri Wallace MD   2022, 9:03 AM  Time Spent for discharge: 35 minutes

## 2022-05-02 LAB — SURGICAL PATHOLOGY REPORT: NORMAL

## 2022-07-06 DIAGNOSIS — G47.00 INSOMNIA, UNSPECIFIED TYPE: ICD-10-CM

## 2022-07-06 RX ORDER — TRAZODONE HYDROCHLORIDE 150 MG/1
TABLET ORAL
Qty: 90 TABLET | Refills: 0 | OUTPATIENT
Start: 2022-07-06

## (undated) DEVICE — STERILE POLYISOPRENE POWDER-FREE SURGICAL GLOVES WITH EMOLLIENT COATING: Brand: PROTEXIS

## (undated) DEVICE — CATHETER IV 14GA L1.25IN TEF STR HUB INTROCAN SFTY

## (undated) DEVICE — FORCEPS BX L240CM WRK CHN 2.8MM STD CAP W/ NDL MIC MESH

## (undated) DEVICE — BLADE SURG NO15 C STL SHRP PREM

## (undated) DEVICE — SUTURE ETHBND EXCEL SZ 5 L30IN NONABSORBABLE GRN L48MM CCS MB47G

## (undated) DEVICE — DRAPE,THYROID,SOFT,STERILE: Brand: MEDLINE

## (undated) DEVICE — CLIP LIG M BLU TI HRT SHP WIRE HORZ 180 PER BX

## (undated) DEVICE — SUTURE VCRL SZ 3-0 L18IN ABSRB UD L26MM SH 1/2 CIR J864D

## (undated) DEVICE — CANNULA NSL AD TBNG L7FT PVC STR NONFLARED PRNG O2 DEL W STD

## (undated) DEVICE — DEFENDO AIR WATER SUCTION AND BIOPSY VALVE KIT FOR  OLYMPUS: Brand: DEFENDO AIR/WATER/SUCTION AND BIOPSY VALVE

## (undated) DEVICE — Z INACTIVE USE 2525529 CONNECTOR TBNG FOR O2

## (undated) DEVICE — STERNUM BLADE (100PK) OFFSET (32.0 X 0.79 X 6.3MM), NON-STERILE

## (undated) DEVICE — DRAPE,REIN 53X77,STERILE: Brand: MEDLINE

## (undated) DEVICE — APPLICATOR MEDICATED 10.5 CC SOLUTION HI LT ORNG CHLORAPREP

## (undated) DEVICE — SUTURE NONABSORBABLE  MERSILINE TP1 SIZE 5

## (undated) DEVICE — SYRINGE,CONTROL,LL,FINGER,GRIP: Brand: MEDLINE INDUSTRIES, INC.

## (undated) DEVICE — ELECTRODE PT RET AD L9FT HI MOIST COND ADH HYDRGEL CORDED

## (undated) DEVICE — CONNECTOR TBNG WHT PLAS SUCT STR 5IN1 LTWT W/ M CONN

## (undated) DEVICE — Z DUPLICATE USE 2527422 TUBING O2 STD 7 FT EXTN NO CRUSH VISUAL CNTRST LF

## (undated) DEVICE — DRAPE,LAP,CHOLE,W/TROUGHS,STERILE: Brand: MEDLINE

## (undated) DEVICE — SUTURE PROL SZ 3-0 L30IN NONABSORBABLE BLU L26MM SH 1/2 CIR 8832H

## (undated) DEVICE — ADHESIVE SKIN CLOSURE TOP 36 CC HI VISC DERMBND MINI

## (undated) DEVICE — C-ARM: Brand: UNBRANDED

## (undated) DEVICE — BLADE CLIPPER GEN PURP NS

## (undated) DEVICE — AGENT HEMOSTATIC SURGIFLOW MATRIX KIT W/THROMBIN

## (undated) DEVICE — PAD,NON-ADHERENT,3X8,STERILE,LF,1/PK: Brand: MEDLINE

## (undated) DEVICE — MITT SURG PREP L ADH DISPOSABLE

## (undated) DEVICE — SPONGE,PEANUT,XRAY,ST,SM,3/8",5/CARD: Brand: MEDLINE INDUSTRIES, INC.

## (undated) DEVICE — SUTURE MCRYL SZ 4-0 L18IN ABSRB UD L16MM PC-3 3/8 CIR PRIM Y845G

## (undated) DEVICE — KIT DRN FLAT W/ 100CC EVAC 7MM FULL PERF

## (undated) DEVICE — 3M™ IOBAN™ 2 ANTIMICROBIAL INCISE DRAPE 6650EZ: Brand: IOBAN™ 2

## (undated) DEVICE — GARMENT,MEDLINE,DVT,INT,CALF,MED, GEN2: Brand: MEDLINE

## (undated) DEVICE — JELLY,LUBE,STERILE,FLIP TOP,TUBE,2-OZ: Brand: MEDLINE

## (undated) DEVICE — CONNECTOR,TUBING,5-IN-1,NON-STERILE: Brand: MEDLINE INDUSTRIES, INC.

## (undated) DEVICE — SYRINGE, LUER LOCK, 10ML: Brand: MEDLINE

## (undated) DEVICE — COLLAR CERV UNIV AD L13-19IN TRACH OPN TWO PC RIG POLYETH

## (undated) DEVICE — NEEDLE HYPO 25GA L1.5IN BLU POLYPR HUB S STL REG BVL STR

## (undated) DEVICE — TOWEL,OR,DSP,ST,NATURAL,DLX,4/PK,20PK/CS: Brand: MEDLINE

## (undated) DEVICE — 3.0MM PRECISION NEURO (MATCH HEAD)

## (undated) DEVICE — GLOVE ORANGE PI 7   MSG9070

## (undated) DEVICE — SCREW SPNL L16MM DIA4.5MM ANT CERV TI VAR ANG OVERSIZED
Type: IMPLANTABLE DEVICE | Site: SPINE CERVICAL | Status: NON-FUNCTIONAL
Removed: 2022-04-19

## (undated) DEVICE — BLADE ES ELASTOMERIC COAT INSUL DURABLE BEND UPTO 90DEG

## (undated) DEVICE — GLOVE SURG SZ 6 THK91MIL LTX FREE SYN POLYISOPRENE ANTI

## (undated) DEVICE — SYRINGE MED 10ML TRNSLUC BRL PLUNG BLK MRK POLYPR CTRL

## (undated) DEVICE — GLOVE SURG SZ 65 THK91MIL LTX FREE SYN POLYISOPRENE

## (undated) DEVICE — DRAPE MICSCP W117XL305CM DIA65MM LENS W VARI LENS2 FOR LEICA

## (undated) DEVICE — GOWN,AURORA,NONREINFORCED,LARGE: Brand: MEDLINE

## (undated) DEVICE — DRESSING BORDERED ADH GZ UNIV GEN USE 5IN 4IN AND 2 1/2IN

## (undated) DEVICE — MARKER,SKIN,WI/RULER AND LABELS: Brand: MEDLINE

## (undated) DEVICE — COVER OR TBL W40XL90IN ABSRB STD AND GRIPPY BK SAHARA

## (undated) DEVICE — SPONGE,NEURO,0.5"X0.5",XR,STRL,10/PK: Brand: MEDLINE

## (undated) DEVICE — THE STERILE LIGHT HANDLE COVER IS USED WITH STERIS SURGICAL LIGHTING AND VISUALIZATION SYSTEMS.

## (undated) DEVICE — Z DISCONTINUED USE 2424143 ADAPTER O2 SWVL CHRISTMAS TREE GRN

## (undated) DEVICE — SYRINGE IRRIG 60ML SFT PLIABLE BLB EZ TO GRP 1 HND USE W/

## (undated) DEVICE — GOWN,POLY REINFORCED,LG: Brand: MEDLINE

## (undated) DEVICE — IMPLANTABLE DEVICE
Type: IMPLANTABLE DEVICE | Site: SPINE CERVICAL | Status: NON-FUNCTIONAL
Removed: 2022-04-19

## (undated) DEVICE — NEEDLE SPNL 18GA L3.5IN W/ QNCKE SHARPER BVL DURA CLICK

## (undated) DEVICE — SUTURE MCRYL SZ 4-0 L18IN ABSRB UD L19MM PS-2 3/8 CIR PRIM Y496G

## (undated) DEVICE — Device

## (undated) DEVICE — CYSTO/BLADDER IRRIGATION SET, REGULATING CLAMP

## (undated) DEVICE — NEPTUNE E-SEP SMOKE EVACUATION PENCIL, COATED, 70MM BLADE, PUSH BUTTON SWITCH: Brand: NEPTUNE E-SEP

## (undated) DEVICE — 1000 S-DRAPE TOWEL DRAPE 10/BX: Brand: STERI-DRAPE™

## (undated) DEVICE — GOWN,SIRUS,NONRNF,SETINSLV,XL,20/CS: Brand: MEDLINE

## (undated) DEVICE — INTENDED FOR TISSUE SEPARATION, AND OTHER PROCEDURES THAT REQUIRE A SHARP SURGICAL BLADE TO PUNCTURE OR CUT.: Brand: BARD-PARKER ® CARBON RIB-BACK BLADES

## (undated) DEVICE — GLOVE ORANGE PI 8   MSG9080

## (undated) DEVICE — PATIENT RETURN ELECTRODE, SINGLE-USE, CONTACT QUALITY MONITORING, ADULT, WITH 9FT CORD, FOR PATIENTS WEIGING OVER 33LBS. (15KG): Brand: MEGADYNE

## (undated) DEVICE — CLIP INT L ORNG TI TRNSVRS GRV CHEVRON SHP W/ PRECIS TIP TO

## (undated) DEVICE — SUTURE NONABSORBABLE MONOFILAMENT 3-0 PS-1 18 IN BLK ETHILON 1663H

## (undated) DEVICE — COVER,MAYO STAND,STERILE: Brand: MEDLINE

## (undated) DEVICE — GAUZE,SPONGE,FLUFF,6"X6.75",STRL,5/TRAY: Brand: MEDLINE

## (undated) DEVICE — BLADE ES L4IN INSUL EDGE

## (undated) DEVICE — SPONGE,NEURO,0.5"X3",XR,STRL,LF,10/PK: Brand: MEDLINE

## (undated) DEVICE — APPLICATOR MEDICATED 26 CC SOLUTION HI LT ORNG CHLORAPREP

## (undated) DEVICE — PACK PROCEDURE SURG LUMBAR SPINE SVMMC